# Patient Record
Sex: FEMALE | Race: WHITE | NOT HISPANIC OR LATINO | URBAN - METROPOLITAN AREA
[De-identification: names, ages, dates, MRNs, and addresses within clinical notes are randomized per-mention and may not be internally consistent; named-entity substitution may affect disease eponyms.]

---

## 2017-05-17 ENCOUNTER — OUTPATIENT (OUTPATIENT)
Dept: OUTPATIENT SERVICES | Facility: HOSPITAL | Age: 67
LOS: 1 days | Discharge: HOME | End: 2017-05-17

## 2017-06-28 DIAGNOSIS — Z12.31 ENCOUNTER FOR SCREENING MAMMOGRAM FOR MALIGNANT NEOPLASM OF BREAST: ICD-10-CM

## 2018-05-23 ENCOUNTER — OUTPATIENT (OUTPATIENT)
Dept: OUTPATIENT SERVICES | Facility: HOSPITAL | Age: 68
LOS: 1 days | Discharge: HOME | End: 2018-05-23

## 2018-05-23 DIAGNOSIS — Z12.31 ENCOUNTER FOR SCREENING MAMMOGRAM FOR MALIGNANT NEOPLASM OF BREAST: ICD-10-CM

## 2019-09-24 ENCOUNTER — OUTPATIENT (OUTPATIENT)
Dept: OUTPATIENT SERVICES | Facility: HOSPITAL | Age: 69
LOS: 1 days | Discharge: HOME | End: 2019-09-24
Payer: MEDICARE

## 2019-09-24 DIAGNOSIS — Z12.31 ENCOUNTER FOR SCREENING MAMMOGRAM FOR MALIGNANT NEOPLASM OF BREAST: ICD-10-CM

## 2019-09-24 PROCEDURE — 77063 BREAST TOMOSYNTHESIS BI: CPT | Mod: 26

## 2019-09-24 PROCEDURE — 77067 SCR MAMMO BI INCL CAD: CPT | Mod: 26

## 2019-12-08 ENCOUNTER — EMERGENCY (EMERGENCY)
Facility: HOSPITAL | Age: 69
LOS: 0 days | Discharge: HOME | End: 2019-12-08
Attending: EMERGENCY MEDICINE | Admitting: EMERGENCY MEDICINE
Payer: MEDICARE

## 2019-12-08 VITALS
OXYGEN SATURATION: 99 % | DIASTOLIC BLOOD PRESSURE: 95 MMHG | HEART RATE: 70 BPM | SYSTOLIC BLOOD PRESSURE: 222 MMHG | TEMPERATURE: 97 F | RESPIRATION RATE: 18 BRPM | HEIGHT: 62 IN | WEIGHT: 153 LBS

## 2019-12-08 VITALS — HEART RATE: 70 BPM | SYSTOLIC BLOOD PRESSURE: 214 MMHG | RESPIRATION RATE: 14 BRPM | DIASTOLIC BLOOD PRESSURE: 93 MMHG

## 2019-12-08 DIAGNOSIS — S00.93XA CONTUSION OF UNSPECIFIED PART OF HEAD, INITIAL ENCOUNTER: ICD-10-CM
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
## 2019-12-08 DIAGNOSIS — R51 HEADACHE: ICD-10-CM
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
## 2019-12-08 DIAGNOSIS — I10 ESSENTIAL (PRIMARY) HYPERTENSION: ICD-10-CM

## 2019-12-08 PROCEDURE — 99284 EMERGENCY DEPT VISIT MOD MDM: CPT

## 2019-12-08 PROCEDURE — 72125 CT NECK SPINE W/O DYE: CPT | Mod: 26

## 2019-12-08 PROCEDURE — 70450 CT HEAD/BRAIN W/O DYE: CPT | Mod: 26

## 2019-12-08 RX ORDER — METHOCARBAMOL 500 MG/1
1500 TABLET, FILM COATED ORAL ONCE
Refills: 0 | Status: COMPLETED | OUTPATIENT
Start: 2019-12-08 | End: 2019-12-08

## 2019-12-08 RX ORDER — IBUPROFEN 200 MG
1 TABLET ORAL
Qty: 15 | Refills: 0
Start: 2019-12-08 | End: 2019-12-12

## 2019-12-08 RX ORDER — METHOCARBAMOL 500 MG/1
1 TABLET, FILM COATED ORAL
Qty: 10 | Refills: 0
Start: 2019-12-08 | End: 2019-12-12

## 2019-12-08 RX ORDER — ACETAMINOPHEN 500 MG
975 TABLET ORAL ONCE
Refills: 0 | Status: COMPLETED | OUTPATIENT
Start: 2019-12-08 | End: 2019-12-08

## 2019-12-08 RX ADMIN — Medication 975 MILLIGRAM(S): at 20:17

## 2019-12-08 RX ADMIN — METHOCARBAMOL 1500 MILLIGRAM(S): 500 TABLET, FILM COATED ORAL at 20:17

## 2019-12-08 NOTE — ED PROVIDER NOTE - CARE PLAN
Principal Discharge DX:	Fall  Secondary Diagnosis:	Hematoma  Secondary Diagnosis:	HTN (hypertension)

## 2019-12-08 NOTE — ED ADULT TRIAGE NOTE - CHIEF COMPLAINT QUOTE
"I was on a three step ladder. I went down the first step and then I thought I was on the ground and I fell. I hit my head. I did not black out. im not on any anticoagulants"

## 2019-12-08 NOTE — ED ADULT NURSE NOTE - OBJECTIVE STATEMENT
Pt reports fall off 3 step ladder hitting the back of her head, denies LOC. Reports bump to back of head with pain to back of head and some discomfort to the neck. No obvious injuries noted, Speaking in full sentences, denies nausea, vomiting, dizziness, chest pain, or sob. PERRL. Pt noted to be hypertensive. Medication administered, CT scan to be performed and vitals to be repeated after medications. Pt reports being evaluated by her PMD for hypertension but has not be prescribe any medication as of now.

## 2019-12-08 NOTE — ED PROVIDER NOTE - NS ED ROS FT
Constitutional: (-) fever  Eyes/ENT: (-) visual changes   Cardiovascular: (-) chest pain, (-) syncope  Respiratory: (-) cough, (-) shortness of breath  Gastrointestinal: (-) vomiting, (-) diarrhea  Genitourinary: (-) dysuria, (-) hesitancy, (-) frequency   Musculoskeletal: (+) neck pain, (-) back pain, (-) joint pain  Integumentary: (-) rash, (-) edema  Neurological: (-) headache, (-) altered mental status  Allergic/Immunologic: (-) pruritus

## 2019-12-08 NOTE — ED ADULT NURSE NOTE - INTERVENTIONS DEFINITIONS
[place] to call system/Non-slip footwear when patient is off stretcher/Physically safe environment: no spills, clutter or unnecessary equipment/Stretcher in lowest position, wheels locked, appropriate side rails in place/Monitor for mental status changes and reorient to person, place, and time/Reinforce activity limits and safety measures with patient and family

## 2019-12-08 NOTE — ED PROVIDER NOTE - NSFOLLOWUPINSTRUCTIONS_ED_ALL_ED_FT
Please follow up with your primary care physician within 24-72 hours and return immediately if symptoms worsen.    Fall Prevention in the Home, Adult  Falls can cause injuries and can affect people from all age groups. There are many simple things that you can do to make your home safe and to help prevent falls. Ask for help when making these changes, if needed.    What actions can I take to prevent falls?  General instructions     Use good lighting in all rooms. Replace any light bulbs that burn out.  Turn on lights if it is dark. Use night-lights.  Place frequently used items in easy-to-reach places. Lower the shelves around your home if necessary.  Set up furniture so that there are clear paths around it. Avoid moving your furniture around.  Remove throw rugs and other tripping hazards from the floor.  Avoid walking on wet floors.  Fix any uneven floor surfaces.  Add color or contrast paint or tape to grab bars and handrails in your home. Place contrasting color strips on the first and last steps of stairways.  When you use a stepladder, make sure that it is completely opened and that the sides are firmly locked. Have someone hold the ladder while you are using it. Do not climb a closed stepladder.  Be aware of any and all pets.  What can I do in the bathroom?     Keep the floor dry. Immediately clean up any water that spills onto the floor.  Remove soap buildup in the tub or shower on a regular basis.  Use non-skid mats or decals on the floor of the tub or shower.  Attach bath mats securely with double-sided, non-slip rug tape.  If you need to sit down while you are in the shower, use a plastic, non-slip stool.  Image ImageInstall grab bars by the toilet and in the tub and shower. Do not use towel bars as grab bars.  What can I do in the bedroom?     Make sure that a bedside light is easy to reach.  Do not use oversized bedding that drapes onto the floor.  Have a firm chair that has side arms to use for getting dressed.  What can I do in the kitchen?     Clean up any spills right away.  If you need to reach for something above you, use a sturdy step stool that has a grab bar.  Keep electrical cables out of the way.  Do not use floor polish or wax that makes floors slippery. If you must use wax, make sure that it is non-skid floor wax.  What can I do in the stairways?     Do not leave any items on the stairs.  Make sure that you have a light switch at the top of the stairs and the bottom of the stairs. Have them installed if you do not have them.  Make sure that there are handrails on both sides of the stairs. Fix handrails that are broken or loose. Make sure that handrails are as long as the stairways.  Install non-slip stair treads on all stairs in your home.  Avoid having throw rugs at the top or bottom of stairways, or secure the rugs with carpet tape to prevent them from moving.  Choose a carpet design that does not hide the edge of steps on the stairway.  Check any carpeting to make sure that it is firmly attached to the stairs. Fix any carpet that is loose or worn.  What can I do on the outside of my home?     Use bright outdoor lighting.  Regularly repair the edges of walkways and driveways and fix any cracks.  Remove high doorway thresholds.  Trim any shrubbery on the main path into your home.  Regularly check that handrails are securely fastened and in good repair. Both sides of any steps should have handrails.  Install guardrails along the edges of any raised decks or porches.  Clear walkways of debris and clutter, including tools and rocks.  Have leaves, snow, and ice cleared regularly.  Use sand or salt on walkways during winter months.  In the garage, clean up any spills right away, including grease or oil spills.  What other actions can I take?     Wear closed-toe shoes that fit well and support your feet. Wear shoes that have rubber soles or low heels.  Use mobility aids as needed, such as canes, walkers, scooters, and crutches.  Review your medicines with your health care provider. Some medicines can cause dizziness or changes in blood pressure, which increase your risk of falling.  Talk with your health care provider about other ways that you can decrease your risk of falls. This may include working with a physical therapist or  to improve your strength, balance, and endurance.    Where to find more information  Centers for Disease Control and Prevention, [place]: https://www.cdc.gov  [employer] [place] [employer] Aging: https://yt0rjkp.hernandez.nih.gov  Contact a health care provider if:  You are afraid of falling at home.  You feel weak, drowsy, or dizzy at home.  You fall at home.  Summary  There are many simple things that you can do to make your home safe and to help prevent falls.  Ways to make your home safe include removing tripping hazards and installing grab bars in the bathroom.  Ask for help when making these changes in your home.  This information is not intended to replace advice given to you by your health care provider. Make sure you discuss any questions you have with your health care provider.    Hypertension  Hypertension, commonly called high blood pressure, is when the force of blood pumping through the arteries is too strong. The arteries are the blood vessels that carry blood from the heart throughout the body. Hypertension forces the heart to work harder to pump blood and may cause arteries to become narrow or stiff. Having untreated or uncontrolled hypertension can cause heart attacks, strokes, kidney disease, and other problems.    A blood pressure reading consists of a higher number over a lower number. Ideally, your blood pressure should be below 120/80. The first ("top") number is called the systolic pressure. It is a measure of the pressure in your arteries as your heart beats. The second ("bottom") number is called the diastolic pressure. It is a measure of the pressure in your arteries as the heart relaxes.    What are the causes?  The cause of this condition is not known.    What increases the risk?  Some risk factors for high blood pressure are under your control. Others are not.    Factors you can change     Smoking.  Having type 2 diabetes mellitus, high cholesterol, or both.  Not getting enough exercise or physical activity.  Being overweight.  Having too much fat, sugar, calories, or salt (sodium) in your diet.  Drinking too much alcohol.  Factors that are difficult or impossible to change     Having chronic kidney disease.  Having a family history of high blood pressure.  Age. Risk increases with age.  Race. You may be at higher risk if you are -American.  Gender. Men are at higher risk than women before age 45. After age 65, women are at higher risk than men.  Having obstructive sleep apnea.  Stress.  What are the signs or symptoms?  Extremely high blood pressure (hypertensive crisis) may cause:    Headache.  Anxiety.  Shortness of breath.  Nosebleed.  Nausea and vomiting.  Severe chest pain.  Jerky movements you cannot control (seizures).    How is this diagnosed?  This condition is diagnosed by measuring your blood pressure while you are seated, with your arm resting on a surface. The cuff of the blood pressure monitor will be placed directly against the skin of your upper arm at the level of your heart. It should be measured at least twice using the same arm. Certain conditions can cause a difference in blood pressure between your right and left arms.    Certain factors can cause blood pressure readings to be lower or higher than normal (elevated) for a short period of time:    When your blood pressure is higher when you are in a health care provider's office than when you are at home, this is called white coat hypertension. Most people with this condition do not need medicines.  When your blood pressure is higher at home than when you are in a health care provider's office, this is called masked hypertension. Most people with this condition may need medicines to control blood pressure.    If you have a high blood pressure reading during one visit or you have normal blood pressure with other risk factors:    You may be asked to return on a different day to have your blood pressure checked again.  You may be asked to monitor your blood pressure at home for 1 week or longer.    If you are diagnosed with hypertension, you may have other blood or imaging tests to help your health care provider understand your overall risk for other conditions.    How is this treated?  This condition is treated by making healthy lifestyle changes, such as eating healthy foods, exercising more, and reducing your alcohol intake. Your health care provider may prescribe medicine if lifestyle changes are not enough to get your blood pressure under control, and if:    Your systolic blood pressure is above 130.  Your diastolic blood pressure is above 80.    Your personal target blood pressure may vary depending on your medical conditions, your age, and other factors.    Follow these instructions at home:  Eating and drinking     Eat a diet that is high in fiber and potassium, and low in sodium, added sugar, and fat. An example eating plan is called the DASH (Dietary Approaches to Stop Hypertension) diet. To eat this way:    Eat plenty of fresh fruits and vegetables. Try to fill half of your plate at each meal with fruits and vegetables.  Eat whole grains, such as whole wheat pasta, brown rice, or whole grain bread. Fill about one quarter of your plate with whole grains.  Eat or drink low-fat dairy products, such as skim milk or low-fat yogurt.  Avoid fatty cuts of meat, processed or cured meats, and poultry with skin. Fill about one quarter of your plate with lean proteins, such as fish, chicken without skin, beans, eggs, and tofu.  Avoid premade and processed foods. These tend to be higher in sodium, added sugar, and fat.    Reduce your daily sodium intake. Most people with hypertension should eat less than 1,500 mg of sodium a day.  ImageLimit alcohol intake to no more than 1 drink a day for nonpregnant women and 2 drinks a day for men. One drink equals 12 oz of beer, 5 oz of wine, or 1½ oz of hard liquor.  Lifestyle     Work with your health care provider to maintain a healthy body weight or to lose weight. Ask what an ideal weight is for you.  Get at least 30 minutes of exercise that causes your heart to beat faster (aerobic exercise) most days of the week. Activities may include walking, swimming, or biking.  Include exercise to strengthen your muscles (resistance exercise), such as pilates or lifting weights, as part of your weekly exercise routine. Try to do these types of exercises for 30 minutes at least 3 days a week.  Do not use any products that contain nicotine or tobacco, such as cigarettes and e-cigarettes. If you need help quitting, ask your health care provider.  Monitor your blood pressure at home as told by your health care provider.  Keep all follow-up visits as told by your health care provider. This is important.  Medicines     Take over-the-counter and prescription medicines only as told by your health care provider. Follow directions carefully. Blood pressure medicines must be taken as prescribed.  Do not skip doses of blood pressure medicine. Doing this puts you at risk for problems and can make the medicine less effective.  Ask your health care provider about side effects or reactions to medicines that you should watch for.  Contact a health care provider if:  You think you are having a reaction to a medicine you are taking.  You have headaches that keep coming back (recurring).  You feel dizzy.  You have swelling in your ankles.  You have trouble with your vision.  Get help right away if:  You develop a severe headache or confusion.  You have unusual weakness or numbness.  You feel faint.  You have severe pain in your chest or abdomen.  You vomit repeatedly.  You have trouble breathing.  Summary  Hypertension is when the force of blood pumping through your arteries is too strong. If this condition is not controlled, it may put you at risk for serious complications.  Your personal target blood pressure may vary depending on your medical conditions, your age, and other factors. For most people, a normal blood pressure is less than 120/80.  Hypertension is treated with lifestyle changes, medicines, or a combination of both. Lifestyle changes include weight loss, eating a healthy, low-sodium diet, exercising more, and limiting alcohol.  This information is not intended to replace advice given to you by your health care provider. Make sure you discuss any questions you have with your health care provider.

## 2019-12-08 NOTE — ED PROVIDER NOTE - OBJECTIVE STATEMENT
70 yo female with no known pmh presents after a fall about 1 hour before arrival. pt states she missed the last step on her step-stool and fell onto her buttock. she hit her head with no LOC or headache. currently has pain to her neck and buttock. ambulated after incident with no pain of difficulty. denies any other symptoms including fevers, chill, headache, recent illness/travel, cough, abdominal pain, chest pain, or SOB

## 2019-12-08 NOTE — ED PROVIDER NOTE - PATIENT PORTAL LINK FT
You can access the FollowMyHealth Patient Portal offered by [facility] by registering at the following website: [email or web address]. By joining [facility]’s FollowMyHealth portal, you will also be able to view your health information using other applications (apps) compatible with our system.

## 2020-10-16 ENCOUNTER — OUTPATIENT (OUTPATIENT)
Dept: OUTPATIENT SERVICES | Facility: HOSPITAL | Age: 70
LOS: 1 days | Discharge: HOME | End: 2020-10-16
Payer: MEDICARE

## 2020-10-16 DIAGNOSIS — Z12.31 ENCOUNTER FOR SCREENING MAMMOGRAM FOR MALIGNANT NEOPLASM OF BREAST: ICD-10-CM

## 2020-10-16 PROCEDURE — 77067 SCR MAMMO BI INCL CAD: CPT | Mod: 26

## 2020-10-16 PROCEDURE — 77063 BREAST TOMOSYNTHESIS BI: CPT | Mod: 26

## 2020-10-23 ENCOUNTER — OUTPATIENT (OUTPATIENT)
Dept: OUTPATIENT SERVICES | Facility: HOSPITAL | Age: 70
LOS: 1 days | Discharge: HOME | End: 2020-10-23
Payer: MEDICARE

## 2020-10-23 DIAGNOSIS — R92.8 OTHER ABNORMAL AND INCONCLUSIVE FINDINGS ON DIAGNOSTIC IMAGING OF BREAST: ICD-10-CM

## 2020-10-23 PROCEDURE — G0279: CPT | Mod: 26

## 2020-10-23 PROCEDURE — 77065 DX MAMMO INCL CAD UNI: CPT | Mod: 26,LT

## 2020-10-23 PROCEDURE — 76642 ULTRASOUND BREAST LIMITED: CPT | Mod: 26,LT

## 2020-11-04 ENCOUNTER — RESULT REVIEW (OUTPATIENT)
Age: 70
End: 2020-11-04

## 2020-11-04 ENCOUNTER — OUTPATIENT (OUTPATIENT)
Dept: OUTPATIENT SERVICES | Facility: HOSPITAL | Age: 70
LOS: 1 days | Discharge: HOME | End: 2020-11-04
Payer: MEDICARE

## 2020-11-04 PROCEDURE — 77065 DX MAMMO INCL CAD UNI: CPT | Mod: 26,LT

## 2020-11-04 PROCEDURE — 19083 BX BREAST 1ST LESION US IMAG: CPT | Mod: LT

## 2020-11-04 PROCEDURE — 88305 TISSUE EXAM BY PATHOLOGIST: CPT | Mod: 26

## 2020-11-05 LAB — SURGICAL PATHOLOGY STUDY: SIGNIFICANT CHANGE UP

## 2020-11-09 DIAGNOSIS — R92.1 MAMMOGRAPHIC CALCIFICATION FOUND ON DIAGNOSTIC IMAGING OF BREAST: ICD-10-CM

## 2020-11-09 DIAGNOSIS — N63.20 UNSPECIFIED LUMP IN THE LEFT BREAST, UNSPECIFIED QUADRANT: ICD-10-CM

## 2020-11-09 DIAGNOSIS — R59.0 LOCALIZED ENLARGED LYMPH NODES: ICD-10-CM

## 2021-01-19 NOTE — ED ADULT TRIAGE NOTE - TEMPERATURE IN FAHRENHEIT (DEGREES F)
[Nocturia] : nocturia [Negative] : Integumentary [Dysuria] : no dysuria [Incontinence] : no incontinence [Hematuria] : no hematuria [Frequency] : no frequency 96.8

## 2021-06-29 ENCOUNTER — OUTPATIENT (OUTPATIENT)
Dept: OUTPATIENT SERVICES | Facility: HOSPITAL | Age: 71
LOS: 1 days | Discharge: HOME | End: 2021-06-29
Payer: MEDICARE

## 2021-06-29 DIAGNOSIS — R92.8 OTHER ABNORMAL AND INCONCLUSIVE FINDINGS ON DIAGNOSTIC IMAGING OF BREAST: ICD-10-CM

## 2021-06-29 PROCEDURE — 76642 ULTRASOUND BREAST LIMITED: CPT | Mod: 26,LT

## 2021-07-26 PROBLEM — Z00.00 ENCOUNTER FOR PREVENTIVE HEALTH EXAMINATION: Status: ACTIVE | Noted: 2021-07-26

## 2021-10-07 ENCOUNTER — APPOINTMENT (OUTPATIENT)
Dept: BREAST CENTER | Facility: CLINIC | Age: 71
End: 2021-10-07
Payer: MEDICARE

## 2021-10-07 VITALS
HEIGHT: 62 IN | TEMPERATURE: 96.3 F | BODY MASS INDEX: 25.58 KG/M2 | SYSTOLIC BLOOD PRESSURE: 136 MMHG | WEIGHT: 139 LBS | DIASTOLIC BLOOD PRESSURE: 80 MMHG

## 2021-10-07 DIAGNOSIS — R59.9 ENLARGED LYMPH NODES, UNSPECIFIED: ICD-10-CM

## 2021-10-07 DIAGNOSIS — Z78.9 OTHER SPECIFIED HEALTH STATUS: ICD-10-CM

## 2021-10-07 DIAGNOSIS — Z86.39 PERSONAL HISTORY OF OTHER ENDOCRINE, NUTRITIONAL AND METABOLIC DISEASE: ICD-10-CM

## 2021-10-07 DIAGNOSIS — Z86.79 PERSONAL HISTORY OF OTHER DISEASES OF THE CIRCULATORY SYSTEM: ICD-10-CM

## 2021-10-07 DIAGNOSIS — R92.8 OTHER ABNORMAL AND INCONCLUSIVE FINDINGS ON DIAGNOSTIC IMAGING OF BREAST: ICD-10-CM

## 2021-10-07 DIAGNOSIS — Z80.3 FAMILY HISTORY OF MALIGNANT NEOPLASM OF BREAST: ICD-10-CM

## 2021-10-07 PROCEDURE — 99203 OFFICE O/P NEW LOW 30 MIN: CPT

## 2021-10-07 RX ORDER — OLMESARTAN MEDOXOMIL-HYDROCHLOROTHIAZIDE 25; 40 MG/1; MG/1
TABLET, FILM COATED ORAL
Refills: 0 | Status: ACTIVE | COMMUNITY

## 2021-10-07 RX ORDER — ROSUVASTATIN CALCIUM 5 MG/1
TABLET, FILM COATED ORAL
Refills: 0 | Status: ACTIVE | COMMUNITY

## 2021-10-07 NOTE — PHYSICAL EXAM
[Normocephalic] : normocephalic [Atraumatic] : atraumatic [No Supraclavicular Adenopathy] : no supraclavicular adenopathy [No Cervical Adenopathy] : no cervical adenopathy [No dominant masses] : no dominant masses in right breast  [No dominant masses] : no dominant masses left breast [No Nipple Discharge] : no left nipple discharge [No Axillary Lymphadenopathy] : no left axillary lymphadenopathy [Breast Nipple Inversion] : nipples not inverted [Breast Nipple Retraction] : nipples not retracted [de-identified] : Scattered bilateral nodularity without any dominant mass. [de-identified] : previous surgical incision. [de-identified] : B/L nonspecific fullness noted.

## 2021-10-07 NOTE — REVIEW OF SYSTEMS
[Negative] : Heme/Lymph [Breast Pain] : no breast pain [Breast Lump] : no breast lump [Nipple Discharge] : no nipple discharge [Nipple Inverted] : no inversion of the nipple

## 2021-10-07 NOTE — DATA REVIEWED
[FreeTextEntry1] :  MG MAMMO SCREEN W TREVOR BI#      \par PROCEDURE DATE:  10/16/2020  \par \par INTERPRETATION:  HISTORY:\par Bilateral MG MAMMO SCREEN W TREVOR BI# was performed. Patient is 70 years old and is seen for screening. The patient has no personal history of cancer. The patient has a history of left excisional biopsy at age 50 - benign. The patient has the following family history of breast cancer:  mother and mother, at age 56, breast cancer.\par \par RISK ASSESSMENT:\par NCI Lifetime Risk: 10.4\par Tyrer-Cuzick Lifetime Risk: 11.1\par \par CLINICAL BREAST EXAM:\par The patient reports her last clinical breast exam was performed 1 month ago.\par \par COMPARISON STUDIES:\par The present examination has been compared to prior imaging studies performed at [facility] on 05/17/2017, 05/23/2018 and 09/24/2019.\par \par MAMMOGRAM FINDINGS:\par Mammography was performed including the following views: bilateral craniocaudal with tomosynthesis and bilateral mediolateral oblique with tomosynthesis.  The examination includes digital synthetic 2D and digital tomosynthesis 3D images. Additional imaging analysis was performed using CAD (computer-aided detection) software.\par \par The breasts are heterogeneously dense, which may obscure small masses.\par \par There are calcifications in the left axilla.\par \par No suspicious mass, grouping of calcifications, or other abnormality is identified in the right breast.\par \par IMPRESSION:\par Calcifications in the left axilla require additional evaluation.\par \par RECOMMENDATION:\par Patient will be recalled for additional views.\par \par ASSESSMENT:\par BI-RADS Category 0:  Incomplete: Needs Additional Imaging Evaluation\par \par EXAM:  MG US BREAST LIMITED LT      \par EXAM:  MG MAMMO DIAG W TREVOR LT#      \par \par *** ADDENDUM 10/26/2020  ***\par \par Medical assistant Krysten at Dr. Ricky Salamanca' office is notified of the result and recommendations on 10/26/2020 at 1:50 PM.\par \par *** END OF ADDENDUM 10/26/2020  ***\par \par \par \par PROCEDURE DATE:  10/23/2020  \par \par \par \par INTERPRETATION:  Clinical History / Reason for exam: The patient returns for additional imaging of  left breast  from screening mammogram dated 10/16/2020.\par \par FAMILY HISTORY OF BREAST CANCER: Mother at age 56\par \par VIEWS: Left breast magnification images includung full 90 degree and tomosynthesis. CAD was also utilized.\par \par COMPARISON: 10/16/2020 through 12/29/2014\par \par BREAST COMPOSITION: The breasts are heterogeneously dense, which may obscure small masses.\par \par FINDINGS:\par Fine microcalcifications are seen involving multiple lymph nodes in the left axillary tail.\par \par BREAST ULTRASOUND:\par Targeted ultrasound of the left lateral breast demonstrates a 5 x 8 x 4 mm hypoechoic mass with internal hyperechoic foci, probably a lymph node with calcifications. The finding is located at 3:00 axis, 14 cm from the nipple. Ultrasound-guided biopsy is suggested.\par \par IMPRESSION:\par \par Multiple left axillary lymph nodes with microcalcifications. Ultrasound-guided biopsy is suggested.\par \par \par Recommendation: Ultrasound guided biopsy.\par \par BI-RADS Category 4: Suspicious\par \par \par EXAM:  MG US BX BRST 1ST LT SISC      \par EXAM:  MG MAMMO DIAG LT      \par \par *** ADDENDUM 11/06/2020  ***\par \par Pathology addendum:\par \par Pathology: LYMPH NODE FRAGMENTS WITH FOCAL AGE RELATED HYALINE DEPOSITION ASSOCIATED WITH CALCIFICATION..\par \par Benign concordant histology.\par \par Recommendation: Follow-up ultrasound in 6 months\par \par The results were discussed with the patient by telephone on 11/6/2020 at 12:30 PM\par \par *** END OF ADDENDUM 11/06/2020  ***\par \par \par \par PROCEDURE DATE:  11/04/2020  \par \par \par \par INTERPRETATION:  CLINICAL HISTORY: Ultrasound-guided biopsy of a mass with calcifications in the left axilla\par \par PROCEDURES: left breast breast ultrasound guided spring loaded core biopsy and post clip placement two view left breast mammogram.\par \par TECHNIQUE: Previous films and reports were reviewed. The procedure, its risks, benefits, and alternatives were explained to the patient, who expressed understanding and gave informed written and verbal consent. A time-out, which included the patient's full name, date of birth, description of the expected procedure and procedure site, was performed immediately before the procedure.\par \par Using the usual sterile technique and ultrasound guidance, the previously described 8 mm node with calcifications in the left breast at 3:00 14 cm from the nipple was biopsied utilizing a 14-gauge automated Bard core biopsy device with a 13-gauge introducer sheath. 4 samples were collected.  A marking clip (Twirl) was deployed under ultrasound guidance. Hemostasis was obtained and a sterile dressing was applied.\par \par A left mammogram was performed:\par VIEWS: CC and ML views of the left breast.\par BREAST COMPOSITION: The breasts are heterogeneously dense, which may obscure small masses.\par FINDINGS: The biopsy clip placed during the ultrasound guided biopsy is in the anticipated location in the left breast.\par FINAL ASSESSMENT Category: Post Procedure Mammogram for Marker Placement\par \par The patient tolerated the procedure well and left the department in good condition with written discharge instructions.\par \par \par IMPRESSION:\par \par Successful ultrasound guided core biopsy of the left breast.\par \par Pathology: Pending, to be reported as an addendum.\par \par \par EXAM:  MG US BREAST LIMITED LT      \par \par \par PROCEDURE DATE:  06/29/2021  \par \par \par \par INTERPRETATION:  Clinical History / Reason for exam: Short interval follow-up after benign biopsy\par \par FAMILY HISTORY OF BREAST CANCER: Mother at age 56\par \par \par FINDINGS:\par \par BREAST ULTRASOUND:\par Targeted ultrasound of the left lateral breast demonstrates a 13 x 13 x 8 mm hypoechoic mass with punctate calcifications within previously measuring 5 x 8 x 4 mm. The finding is located at 3:00 axis, 14 cm from the nipple.\par \par IMPRESSION:\par \par Interval enlargement of the previously biopsied lesion.\par \par \par Recommendation: Surgical consultation\par \par BI-RADS Category 2: Benign\par \par The findings and recommendations were discussed with the patient prior to departure.\par \par

## 2021-10-07 NOTE — REASON FOR VISIT
[Initial Evaluation] : an initial evaluation [Spouse] : spouse [FreeTextEntry1] : imaging / path review.

## 2021-10-07 NOTE — HISTORY OF PRESENT ILLNESS
[FreeTextEntry1] : PCP: Dr. Bello Uriostegui\par GYN: Dr. Ricky Hardy\par \par Radha is 71 year old female here for evaluation of enlarged LEFT axillary LN s/p bx.\par \par Her work up was as follows: \par 10/16/2020 B/L Screening Mammo:\par -Breasts are heterogeneously dense.\par -Calcifications noted in the left axilla.\par BI-RADS Category 0:  Incomplete: Needs Additional Imaging Evaluation\par \par 10/23/2020 LEFT Uni Dx Mammo & Sono:\par -Fine microcalcifications are seen involving multiple lymph nodes in the left axillary tail.\par - On sono, 5 x 8 x 4 mm hypoechoic mass with internal hyperechoic foci, probably a lymph node with calcifications. The finding is located at 3:00 axis, 14 cm from the nipple. Ultrasound-guided biopsy is suggested.\par BI-RADS Category 4: Suspicious\par \par 2020 - Ultrasound Guided Core Bx (Twirl) \par Left, 3:00 N14, 8mm: (twirl)\par - Lymph node fragments with focal age related hyaline deposition associated with calcification.\par - No glandular breast tissue is identified in this biopsy material.\par Benign concordant histology.\par Recommendation: Follow-up ultrasound in 6 months\par \par 2021 - Left Breast Sono:\par -13 x 13 x 8 mm hypoechoic mass with punctate calcifications within previously measuring 5 x 8 x 4 mm.\par -Interval enlargement of the previously biopsied lesion.\par -Recommendation: Surgical consultation\par BI-RADS Category 2: Benign\par \par \par HISTORICAL RISK FACTORS:\par -Previous benign mass excision of the left breast - .\par -Family hx of breast cancer - mother (56).\par -\par -No prior reported OCP use.\par -No gyn surgeries. \par \par *Of note - Pt received COVID-19 vaccine in Left upper extremity in  / 2021.\par \par Umu Model Risk Assessment:\par 5 yr - 5.2%\par Life - 12.3%\par \par Tyrer-Cuzick Risk Assessment (v6):\par 5 yr - 3.8% \par Life - 7.9%\par

## 2021-10-07 NOTE — ASSESSMENT
[FreeTextEntry1] : Radha is 71 year old female here for evaluation of enlarged left axillary lymph node s/p bx yielding benign results.\par \par On physical exam, there is scattered bilateral nodularity without any dominant mass.  The axilla has some B/L nonspecific fullness noted.\par \par We discussed her biopsy results in detail.  And of note, she received her COVID-19 vaccines in her left upper extremity in Jan / Feb 2021.  Theoretically this could have caused an enlargement of the axillary lymph nodes.\par Since she is due for her annual breast imaging this month, I think it is reasonable to have this repeated before proceeding with any surgical excision.\par \par Although she has a family history of breast cancer in her mother, she does not quite meet the requirements for high risk screening with breast MRI's and / or chemoprophylaxis. \par \par We discussed dense breasts.  Women who have dense breast tissue have a higher risk of breast cancer compared to women with less dense breast tissue.  It is unclear at this time why dense breast tissue is linked to breast cancer risk.  One can consider bilateral screening ultrasound for patients with heterogeneously to extremely dense breasts.  However, out of 1000 women screened, the use of routine ultrasound will only identify an additional 3-5 cancers.  The use of ultrasound was found to increase the likelihood of undergoing more imaging and more biopsies.  She does have dense breasts.  We have decided to proceed with screening bilateral breast ultrasound at this time.  This will be scheduled with her next screening mammogram.  \par \par PLAN:\par -B/L Dx Mammo & Sono - on or about 10/17/2021.\par -Plan to discuss results when available; and if benign she will follow up in April 2022.

## 2021-10-26 ENCOUNTER — RESULT REVIEW (OUTPATIENT)
Age: 71
End: 2021-10-26

## 2021-10-26 ENCOUNTER — OUTPATIENT (OUTPATIENT)
Dept: OUTPATIENT SERVICES | Facility: HOSPITAL | Age: 71
LOS: 1 days | Discharge: HOME | End: 2021-10-26
Payer: MEDICARE

## 2021-10-26 DIAGNOSIS — R92.8 OTHER ABNORMAL AND INCONCLUSIVE FINDINGS ON DIAGNOSTIC IMAGING OF BREAST: ICD-10-CM

## 2021-10-26 PROCEDURE — 76642 ULTRASOUND BREAST LIMITED: CPT | Mod: 26,LT

## 2021-10-26 PROCEDURE — G0279: CPT | Mod: 26

## 2021-10-26 PROCEDURE — 77066 DX MAMMO INCL CAD BI: CPT | Mod: 26

## 2021-10-27 ENCOUNTER — NON-APPOINTMENT (OUTPATIENT)
Age: 71
End: 2021-10-27

## 2021-12-14 ENCOUNTER — NON-APPOINTMENT (OUTPATIENT)
Age: 71
End: 2021-12-14

## 2022-04-26 ENCOUNTER — OUTPATIENT (OUTPATIENT)
Dept: OUTPATIENT SERVICES | Facility: HOSPITAL | Age: 72
LOS: 1 days | Discharge: HOME | End: 2022-04-26
Payer: MEDICARE

## 2022-04-26 ENCOUNTER — RESULT REVIEW (OUTPATIENT)
Age: 72
End: 2022-04-26

## 2022-04-26 DIAGNOSIS — R92.8 OTHER ABNORMAL AND INCONCLUSIVE FINDINGS ON DIAGNOSTIC IMAGING OF BREAST: ICD-10-CM

## 2022-04-26 PROCEDURE — 76642 ULTRASOUND BREAST LIMITED: CPT | Mod: 26,LT

## 2022-04-27 ENCOUNTER — NON-APPOINTMENT (OUTPATIENT)
Age: 72
End: 2022-04-27

## 2022-10-28 ENCOUNTER — RESULT REVIEW (OUTPATIENT)
Age: 72
End: 2022-10-28

## 2022-10-28 ENCOUNTER — OUTPATIENT (OUTPATIENT)
Dept: OUTPATIENT SERVICES | Facility: HOSPITAL | Age: 72
LOS: 1 days | Discharge: HOME | End: 2022-10-28

## 2022-10-28 DIAGNOSIS — R92.8 OTHER ABNORMAL AND INCONCLUSIVE FINDINGS ON DIAGNOSTIC IMAGING OF BREAST: ICD-10-CM

## 2022-10-28 PROCEDURE — 76642 ULTRASOUND BREAST LIMITED: CPT | Mod: 26,LT

## 2022-10-28 PROCEDURE — G0279: CPT | Mod: 26

## 2022-10-28 PROCEDURE — 77066 DX MAMMO INCL CAD BI: CPT | Mod: 26

## 2022-11-03 ENCOUNTER — APPOINTMENT (OUTPATIENT)
Dept: BREAST CENTER | Facility: CLINIC | Age: 72
End: 2022-11-03

## 2022-11-10 ENCOUNTER — OUTPATIENT (OUTPATIENT)
Dept: OUTPATIENT SERVICES | Facility: HOSPITAL | Age: 72
LOS: 1 days | Discharge: HOME | End: 2022-11-10

## 2022-11-10 ENCOUNTER — TRANSCRIPTION ENCOUNTER (OUTPATIENT)
Age: 72
End: 2022-11-10

## 2022-11-10 VITALS
HEART RATE: 58 BPM | DIASTOLIC BLOOD PRESSURE: 69 MMHG | SYSTOLIC BLOOD PRESSURE: 150 MMHG | TEMPERATURE: 97 F | WEIGHT: 147.93 LBS | OXYGEN SATURATION: 99 % | RESPIRATION RATE: 17 BRPM | HEIGHT: 62 IN

## 2022-11-10 VITALS
SYSTOLIC BLOOD PRESSURE: 169 MMHG | RESPIRATION RATE: 17 BRPM | HEART RATE: 68 BPM | DIASTOLIC BLOOD PRESSURE: 72 MMHG | OXYGEN SATURATION: 99 %

## 2022-11-10 DIAGNOSIS — Z98.890 OTHER SPECIFIED POSTPROCEDURAL STATES: Chronic | ICD-10-CM

## 2022-11-10 DIAGNOSIS — Z90.49 ACQUIRED ABSENCE OF OTHER SPECIFIED PARTS OF DIGESTIVE TRACT: Chronic | ICD-10-CM

## 2022-11-10 RX ORDER — SODIUM CHLORIDE 9 MG/ML
1000 INJECTION, SOLUTION INTRAVENOUS
Refills: 0 | Status: DISCONTINUED | OUTPATIENT
Start: 2022-11-10 | End: 2022-11-10

## 2022-11-10 NOTE — ASU DISCHARGE PLAN (ADULT/PEDIATRIC) - NS MD DC FALL RISK RISK
For information on Fall & Injury Prevention, visit: [email or web address] OR  [email or web address] OR  https://www.cdc.gov/steadi/patient.html

## 2022-11-10 NOTE — PACU DISCHARGE NOTE - COMMENTS
72 y o female S/P Left ECCE with IOL Implant, LSB/MAC without complications. VS /70 HR 51 RR 16 SaO2 99%. Pt tolerated procedure well.

## 2022-11-10 NOTE — ASU PATIENT PROFILE, ADULT - FALL HARM RISK - UNIVERSAL INTERVENTIONS
Bed in lowest position, wheels locked, appropriate side rails in place/Call bell, personal items and telephone in reach/Instruct patient to call for assistance before getting out of bed or chair/Non-slip footwear when patient is out of bed/[place] to call system/Physically safe environment - no spills, clutter or unnecessary equipment/Purposeful Proactive Rounding/Room/bathroom lighting operational, light cord in reach

## 2022-11-16 DIAGNOSIS — Z79.1 LONG TERM (CURRENT) USE OF NON-STEROIDAL ANTI-INFLAMMATORIES (NSAID): ICD-10-CM

## 2022-11-16 DIAGNOSIS — H25.89 OTHER AGE-RELATED CATARACT: ICD-10-CM

## 2022-11-16 DIAGNOSIS — Z91.040 LATEX ALLERGY STATUS: ICD-10-CM

## 2022-11-16 DIAGNOSIS — Z88.0 ALLERGY STATUS TO PENICILLIN: ICD-10-CM

## 2022-11-17 ENCOUNTER — OUTPATIENT (OUTPATIENT)
Dept: OUTPATIENT SERVICES | Facility: HOSPITAL | Age: 72
LOS: 1 days | Discharge: HOME | End: 2022-11-17

## 2022-11-17 VITALS
HEART RATE: 55 BPM | SYSTOLIC BLOOD PRESSURE: 158 MMHG | RESPIRATION RATE: 18 BRPM | OXYGEN SATURATION: 100 % | DIASTOLIC BLOOD PRESSURE: 72 MMHG

## 2022-11-17 VITALS — HEIGHT: 62 IN | WEIGHT: 147.93 LBS

## 2022-11-17 DIAGNOSIS — Z98.42 CATARACT EXTRACTION STATUS, LEFT EYE: Chronic | ICD-10-CM

## 2022-11-17 DIAGNOSIS — Z90.49 ACQUIRED ABSENCE OF OTHER SPECIFIED PARTS OF DIGESTIVE TRACT: Chronic | ICD-10-CM

## 2022-11-17 DIAGNOSIS — Z98.890 OTHER SPECIFIED POSTPROCEDURAL STATES: Chronic | ICD-10-CM

## 2022-11-17 RX ORDER — ROSUVASTATIN CALCIUM 5 MG/1
1 TABLET ORAL
Qty: 0 | Refills: 0 | DISCHARGE

## 2022-11-17 RX ORDER — OLMESARTAN MEDOXOMIL 5 MG/1
1 TABLET, FILM COATED ORAL
Qty: 0 | Refills: 0 | DISCHARGE

## 2022-11-17 NOTE — ASU PATIENT PROFILE, ADULT - PATIENT KNOW
yes Cosentyx Counseling:  I discussed with the patient the risks of Cosentyx including but not limited to worsening of Crohn's disease, immunosuppression, allergic reactions and infections.  The patient understands that monitoring is required including a PPD at baseline and must alert us or the primary physician if symptoms of infection or other concerning signs are noted.

## 2022-11-17 NOTE — ASU PATIENT PROFILE, ADULT - NS PRO LAST MENSTRUAL
Message relayed to patient who verbalized understanding. Nothing further at this time. not applicable

## 2022-11-17 NOTE — ASU PATIENT PROFILE, ADULT - FALL HARM RISK - UNIVERSAL INTERVENTIONS
Bed in lowest position, wheels locked, appropriate side rails in place/Call bell, personal items and telephone in reach/Instruct patient to call for assistance before getting out of bed or chair/Non-slip footwear when patient is out of bed/[place] to call system/Physically safe environment - no spills, clutter or unnecessary equipment/Purposeful Proactive Rounding/Room/bathroom lighting operational, light cord in reach

## 2022-11-17 NOTE — PRE-ANESTHESIA EVALUATION ADULT - NSANTHOSAYNRD_GEN_A_CORE
denies/No. YAQUELIN screening performed.  STOP BANG Legend: 0-2 = LOW Risk; 3-4 = INTERMEDIATE Risk; 5-8 = HIGH Risk

## 2022-11-17 NOTE — ASU PATIENT PROFILE, ADULT - NSICDXPASTSURGICALHX_GEN_ALL_CORE_FT
PAST SURGICAL HISTORY:  H/O lumpectomy left    History of dilation and curettage     S/P liudmila     Status post cataract extraction and insertion of intraocular lens, left

## 2022-11-22 DIAGNOSIS — E78.5 HYPERLIPIDEMIA, UNSPECIFIED: ICD-10-CM

## 2022-11-22 DIAGNOSIS — H25.091 OTHER AGE-RELATED INCIPIENT CATARACT, RIGHT EYE: ICD-10-CM

## 2022-11-22 DIAGNOSIS — Z91.040 LATEX ALLERGY STATUS: ICD-10-CM

## 2022-11-22 DIAGNOSIS — Z88.0 ALLERGY STATUS TO PENICILLIN: ICD-10-CM

## 2022-11-22 DIAGNOSIS — I10 ESSENTIAL (PRIMARY) HYPERTENSION: ICD-10-CM

## 2023-09-05 NOTE — ASU PATIENT PROFILE, ADULT - AS SC BRADEN MOBILITY
Hospitalist notified of vitals and Cardizem drip stopped at this time. Patient awake and alert with no s/s of pain/discomfort. All needs met. Call light within reach. (4) no limitation

## 2023-09-22 NOTE — ASU PATIENT PROFILE, ADULT - FALL HARM RISK - CONCLUSION
Detail Level: Detailed Add 00288 Cpt? (Important Note: In 2017 The Use Of 86829 Is Being Tracked By Cms To Determine Future Global Period Reimbursement For Global Periods): yes Universal Safety Interventions

## 2023-12-26 PROBLEM — I10 ESSENTIAL (PRIMARY) HYPERTENSION: Chronic | Status: ACTIVE | Noted: 2022-11-10

## 2023-12-26 PROBLEM — E78.5 HYPERLIPIDEMIA, UNSPECIFIED: Chronic | Status: ACTIVE | Noted: 2022-11-10

## 2023-12-26 PROBLEM — H26.9 UNSPECIFIED CATARACT: Chronic | Status: ACTIVE | Noted: 2022-11-10

## 2024-01-03 ENCOUNTER — OUTPATIENT (OUTPATIENT)
Dept: OUTPATIENT SERVICES | Facility: HOSPITAL | Age: 74
LOS: 1 days | End: 2024-01-03
Payer: MEDICARE

## 2024-01-03 DIAGNOSIS — Z90.49 ACQUIRED ABSENCE OF OTHER SPECIFIED PARTS OF DIGESTIVE TRACT: Chronic | ICD-10-CM

## 2024-01-03 DIAGNOSIS — Z98.890 OTHER SPECIFIED POSTPROCEDURAL STATES: Chronic | ICD-10-CM

## 2024-01-03 DIAGNOSIS — Z98.42 CATARACT EXTRACTION STATUS, LEFT EYE: Chronic | ICD-10-CM

## 2024-01-03 DIAGNOSIS — Z12.31 ENCOUNTER FOR SCREENING MAMMOGRAM FOR MALIGNANT NEOPLASM OF BREAST: ICD-10-CM

## 2024-01-03 PROCEDURE — 77063 BREAST TOMOSYNTHESIS BI: CPT | Mod: 26

## 2024-01-03 PROCEDURE — 77067 SCR MAMMO BI INCL CAD: CPT

## 2024-01-03 PROCEDURE — 77063 BREAST TOMOSYNTHESIS BI: CPT

## 2024-01-03 PROCEDURE — 77067 SCR MAMMO BI INCL CAD: CPT | Mod: 26

## 2024-01-04 DIAGNOSIS — Z12.31 ENCOUNTER FOR SCREENING MAMMOGRAM FOR MALIGNANT NEOPLASM OF BREAST: ICD-10-CM

## 2024-02-16 NOTE — ASU PATIENT PROFILE, ADULT - PAIN CHRONIC, PROFILE
Contact Numbers  [facility]: [phone] (for symptom and scheduling needs)    Please call the [facility] Triage line if you experience a temperature greater than or equal to 100.4, shaking chills, have uncontrolled nausea, vomiting and/or diarrhea, dizziness, shortness of breath, chest pain, bleeding, unexplained bruising, or if you have any other new/concerning symptoms, questions or concerns.     If you are having any concerning symptoms or wish to speak to a provider before your next infusion visit, please call your care coordinator or triage to notify them so we can adequately serve you.     If you need a refill on a narcotic prescription or other medication, please call triage before your infusion appointment.     
no

## 2024-06-07 NOTE — ASU PATIENT PROFILE, ADULT - SUPPORT PERSON NAME
Please follow-up with your vascular specialist in order to further investigate your pain with ambulating.  Return to the emergency room for any worsening symptoms.    Cellulitis    Cellulitis is a skin infection caused by bacteria. This condition occurs most often in the arms and lower legs but can occur anywhere over the body. Symptoms include redness, swelling, warm skin, tenderness, and chills/fever. If you were prescribed an antibiotic medicine, take it as told by your health care provider. Do not stop taking the antibiotic even if you start to feel better.    SEEK IMMEDIATE MEDICAL CARE IF YOU HAVE ANY OF THE FOLLOWING SYMPTOMS: worsening fever, red streaks coming from affected area, vomiting or diarrhea, or dizziness/lightheadedness.
Caleb

## 2024-09-23 NOTE — ASU PATIENT PROFILE, ADULT - ACCEPTABLE
Placed call to pt, spoke with daughter regarding the missed lab of today, and to remind of appt with Patrick Levine NP on 9/24.  She stated pt is now under the care of [facility] since last Wednesday, 9/18.  Will notify Dr Lawrence, Patrick eLvine NP, and infusion.  Appt cancelled with Patrick Levine NP.   0

## 2025-02-17 NOTE — ED PROVIDER NOTE - PHYSICAL EXAMINATION
Gen: NAD, AOx3  Head: NCAT  HEENT: PERRL, oral mucosa moist, normal conjunctiva, oropharynx clear without exudate or erythema, hematoma to L parietal with point tenderness   Lung: CTAB, no respiratory distress, no wheezing, rales, rhonchi  CV: normal s1/s2, rrr, Normal perfusion, pulses 2+ throughout  Abd: soft, NTND, no CVA tenderness  Genitourinary: no pelvic tenderness  MSK: No edema, no visible deformities, full range of motion in all 4 extremities, no spinal tenderness, ROM of neck with no tenderness, tenderness to trapezius   Neuro: CN II-XII grossly intact, No focal neurologic deficits, no nystagmus/pronator drift, coordination/sensation intact, strength 5/5 BUE/BLE, steady gait   Skin: No rash   Psych: normal affect Caesarean section

## 2025-04-23 ENCOUNTER — INPATIENT (INPATIENT)
Facility: HOSPITAL | Age: 75
LOS: 8 days | Discharge: HOME HEALTH | DRG: 754 | End: 2025-05-02
Attending: INTERNAL MEDICINE | Admitting: STUDENT IN AN ORGANIZED HEALTH CARE EDUCATION/TRAINING PROGRAM
Payer: MEDICARE

## 2025-04-23 VITALS
OXYGEN SATURATION: 97 % | TEMPERATURE: 99 F | HEART RATE: 88 BPM | WEIGHT: 164.91 LBS | DIASTOLIC BLOOD PRESSURE: 79 MMHG | SYSTOLIC BLOOD PRESSURE: 173 MMHG | RESPIRATION RATE: 18 BRPM

## 2025-04-23 DIAGNOSIS — D63.1 ANEMIA IN CHRONIC KIDNEY DISEASE: ICD-10-CM

## 2025-04-23 DIAGNOSIS — N39.0 URINARY TRACT INFECTION, SITE NOT SPECIFIED: ICD-10-CM

## 2025-04-23 DIAGNOSIS — C56.2 MALIGNANT NEOPLASM OF LEFT OVARY: ICD-10-CM

## 2025-04-23 DIAGNOSIS — D63.0 ANEMIA IN NEOPLASTIC DISEASE: ICD-10-CM

## 2025-04-23 DIAGNOSIS — G93.41 METABOLIC ENCEPHALOPATHY: ICD-10-CM

## 2025-04-23 DIAGNOSIS — Z88.0 ALLERGY STATUS TO PENICILLIN: ICD-10-CM

## 2025-04-23 DIAGNOSIS — N18.30 CHRONIC KIDNEY DISEASE, STAGE 3 UNSPECIFIED: ICD-10-CM

## 2025-04-23 DIAGNOSIS — E87.20 ACIDOSIS, UNSPECIFIED: ICD-10-CM

## 2025-04-23 DIAGNOSIS — E87.5 HYPERKALEMIA: ICD-10-CM

## 2025-04-23 DIAGNOSIS — R63.4 ABNORMAL WEIGHT LOSS: ICD-10-CM

## 2025-04-23 DIAGNOSIS — C78.89 SECONDARY MALIGNANT NEOPLASM OF OTHER DIGESTIVE ORGANS: ICD-10-CM

## 2025-04-23 DIAGNOSIS — C78.7 SECONDARY MALIGNANT NEOPLASM OF LIVER AND INTRAHEPATIC BILE DUCT: ICD-10-CM

## 2025-04-23 DIAGNOSIS — N17.9 ACUTE KIDNEY FAILURE, UNSPECIFIED: ICD-10-CM

## 2025-04-23 DIAGNOSIS — Z90.49 ACQUIRED ABSENCE OF OTHER SPECIFIED PARTS OF DIGESTIVE TRACT: Chronic | ICD-10-CM

## 2025-04-23 DIAGNOSIS — Z98.890 OTHER SPECIFIED POSTPROCEDURAL STATES: Chronic | ICD-10-CM

## 2025-04-23 DIAGNOSIS — R50.82 POSTPROCEDURAL FEVER: ICD-10-CM

## 2025-04-23 DIAGNOSIS — N13.30 UNSPECIFIED HYDRONEPHROSIS: ICD-10-CM

## 2025-04-23 DIAGNOSIS — Z66 DO NOT RESUSCITATE: ICD-10-CM

## 2025-04-23 DIAGNOSIS — E78.5 HYPERLIPIDEMIA, UNSPECIFIED: ICD-10-CM

## 2025-04-23 DIAGNOSIS — Z98.42 CATARACT EXTRACTION STATUS, LEFT EYE: Chronic | ICD-10-CM

## 2025-04-23 DIAGNOSIS — Z91.040 LATEX ALLERGY STATUS: ICD-10-CM

## 2025-04-23 DIAGNOSIS — I12.9 HYPERTENSIVE CHRONIC KIDNEY DISEASE WITH STAGE 1 THROUGH STAGE 4 CHRONIC KIDNEY DISEASE, OR UNSPECIFIED CHRONIC KIDNEY DISEASE: ICD-10-CM

## 2025-04-23 DIAGNOSIS — C79.51 SECONDARY MALIGNANT NEOPLASM OF BONE: ICD-10-CM

## 2025-04-23 LAB
ALBUMIN SERPL ELPH-MCNC: 3.3 G/DL — LOW (ref 3.5–5.2)
ALP SERPL-CCNC: 104 U/L — SIGNIFICANT CHANGE UP (ref 30–115)
ALT FLD-CCNC: 78 U/L — HIGH (ref 0–41)
ANION GAP SERPL CALC-SCNC: 15 MMOL/L — HIGH (ref 7–14)
APPEARANCE UR: CLEAR — SIGNIFICANT CHANGE UP
AST SERPL-CCNC: 112 U/L — HIGH (ref 0–41)
BACTERIA # UR AUTO: NEGATIVE /HPF — SIGNIFICANT CHANGE UP
BASE EXCESS BLDV CALC-SCNC: -7.1 MMOL/L — LOW (ref -2–3)
BASOPHILS # BLD AUTO: 0.03 K/UL — SIGNIFICANT CHANGE UP (ref 0–0.2)
BASOPHILS NFR BLD AUTO: 0.3 % — SIGNIFICANT CHANGE UP (ref 0–1)
BILIRUB SERPL-MCNC: <0.2 MG/DL — SIGNIFICANT CHANGE UP (ref 0.2–1.2)
BILIRUB UR-MCNC: NEGATIVE — SIGNIFICANT CHANGE UP
BUN SERPL-MCNC: 71 MG/DL — CRITICAL HIGH (ref 10–20)
CA-I SERPL-SCNC: 1.29 MMOL/L — SIGNIFICANT CHANGE UP (ref 1.15–1.33)
CALCIUM SERPL-MCNC: 9 MG/DL — SIGNIFICANT CHANGE UP (ref 8.4–10.5)
CAST: 2 /LPF — SIGNIFICANT CHANGE UP (ref 0–4)
CHLORIDE SERPL-SCNC: 111 MMOL/L — HIGH (ref 98–110)
CO2 SERPL-SCNC: 16 MMOL/L — LOW (ref 17–32)
COLOR SPEC: SIGNIFICANT CHANGE UP
CREAT SERPL-MCNC: 1.9 MG/DL — HIGH (ref 0.7–1.5)
DIFF PNL FLD: NEGATIVE — SIGNIFICANT CHANGE UP
EGFR: 27 ML/MIN/1.73M2 — LOW
EGFR: 27 ML/MIN/1.73M2 — LOW
EOSINOPHIL # BLD AUTO: 0.05 K/UL — SIGNIFICANT CHANGE UP (ref 0–0.7)
EOSINOPHIL NFR BLD AUTO: 0.5 % — SIGNIFICANT CHANGE UP (ref 0–8)
GAS PNL BLDV: 138 MMOL/L — SIGNIFICANT CHANGE UP (ref 136–145)
GAS PNL BLDV: SIGNIFICANT CHANGE UP
GLUCOSE SERPL-MCNC: 138 MG/DL — HIGH (ref 70–99)
GLUCOSE UR QL: NEGATIVE MG/DL — SIGNIFICANT CHANGE UP
HCO3 BLDV-SCNC: 17 MMOL/L — LOW (ref 22–29)
HCT VFR BLD CALC: 27.8 % — LOW (ref 37–47)
HCT VFR BLDA CALC: 27 % — LOW (ref 34.5–46.5)
HGB BLD CALC-MCNC: 9.1 G/DL — LOW (ref 11.7–16.1)
HGB BLD-MCNC: 9 G/DL — LOW (ref 12–16)
IMM GRANULOCYTES NFR BLD AUTO: 1.1 % — HIGH (ref 0.1–0.3)
KETONES UR-MCNC: NEGATIVE MG/DL — SIGNIFICANT CHANGE UP
LACTATE BLDV-MCNC: 0.7 MMOL/L — SIGNIFICANT CHANGE UP (ref 0.5–2)
LEUKOCYTE ESTERASE UR-ACNC: ABNORMAL
LYMPHOCYTES # BLD AUTO: 0.61 K/UL — LOW (ref 1.2–3.4)
LYMPHOCYTES # BLD AUTO: 6.5 % — LOW (ref 20.5–51.1)
MCHC RBC-ENTMCNC: 29.5 PG — SIGNIFICANT CHANGE UP (ref 27–31)
MCHC RBC-ENTMCNC: 32.4 G/DL — SIGNIFICANT CHANGE UP (ref 32–37)
MCV RBC AUTO: 91.1 FL — SIGNIFICANT CHANGE UP (ref 81–99)
MONOCYTES # BLD AUTO: 0.99 K/UL — HIGH (ref 0.1–0.6)
MONOCYTES NFR BLD AUTO: 10.6 % — HIGH (ref 1.7–9.3)
NEUTROPHILS # BLD AUTO: 7.57 K/UL — HIGH (ref 1.4–6.5)
NEUTROPHILS NFR BLD AUTO: 81 % — HIGH (ref 42.2–75.2)
NITRITE UR-MCNC: NEGATIVE — SIGNIFICANT CHANGE UP
NRBC BLD AUTO-RTO: 0 /100 WBCS — SIGNIFICANT CHANGE UP (ref 0–0)
PCO2 BLDV: 27 MMHG — LOW (ref 39–42)
PH BLDV: 7.4 — SIGNIFICANT CHANGE UP (ref 7.32–7.43)
PH UR: 5.5 — SIGNIFICANT CHANGE UP (ref 5–8)
PLATELET # BLD AUTO: 245 K/UL — SIGNIFICANT CHANGE UP (ref 130–400)
PMV BLD: 11.6 FL — HIGH (ref 7.4–10.4)
PO2 BLDV: 85 MMHG — HIGH (ref 25–45)
POTASSIUM BLDV-SCNC: 5 MMOL/L — SIGNIFICANT CHANGE UP (ref 3.5–5.1)
POTASSIUM SERPL-MCNC: 5.4 MMOL/L — HIGH (ref 3.5–5)
POTASSIUM SERPL-SCNC: 5.4 MMOL/L — HIGH (ref 3.5–5)
PROT SERPL-MCNC: 6.6 G/DL — SIGNIFICANT CHANGE UP (ref 6–8)
PROT UR-MCNC: 100 MG/DL
RBC # BLD: 3.05 M/UL — LOW (ref 4.2–5.4)
RBC # FLD: 13.5 % — SIGNIFICANT CHANGE UP (ref 11.5–14.5)
RBC CASTS # UR COMP ASSIST: 1 /HPF — SIGNIFICANT CHANGE UP (ref 0–4)
SAO2 % BLDV: 98.9 % — HIGH (ref 67–88)
SODIUM SERPL-SCNC: 142 MMOL/L — SIGNIFICANT CHANGE UP (ref 135–146)
SP GR SPEC: 1.02 — SIGNIFICANT CHANGE UP (ref 1–1.03)
SQUAMOUS # UR AUTO: 1 /HPF — SIGNIFICANT CHANGE UP (ref 0–5)
UROBILINOGEN FLD QL: 1 MG/DL — SIGNIFICANT CHANGE UP (ref 0.2–1)
WBC # BLD: 9.35 K/UL — SIGNIFICANT CHANGE UP (ref 4.8–10.8)
WBC # FLD AUTO: 9.35 K/UL — SIGNIFICANT CHANGE UP (ref 4.8–10.8)
WBC UR QL: 21 /HPF — HIGH (ref 0–5)

## 2025-04-23 PROCEDURE — 74177 CT ABD & PELVIS W/CONTRAST: CPT | Mod: MC

## 2025-04-23 PROCEDURE — 49180 BIOPSY ABDOMINAL MASS: CPT

## 2025-04-23 PROCEDURE — 99285 EMERGENCY DEPT VISIT HI MDM: CPT | Mod: FS

## 2025-04-23 PROCEDURE — 84156 ASSAY OF PROTEIN URINE: CPT

## 2025-04-23 PROCEDURE — 86900 BLOOD TYPING SEROLOGIC ABO: CPT

## 2025-04-23 PROCEDURE — 83605 ASSAY OF LACTIC ACID: CPT

## 2025-04-23 PROCEDURE — A9579: CPT

## 2025-04-23 PROCEDURE — 36415 COLL VENOUS BLD VENIPUNCTURE: CPT

## 2025-04-23 PROCEDURE — 70450 CT HEAD/BRAIN W/O DYE: CPT | Mod: MC

## 2025-04-23 PROCEDURE — 82140 ASSAY OF AMMONIA: CPT

## 2025-04-23 PROCEDURE — 86160 COMPLEMENT ANTIGEN: CPT

## 2025-04-23 PROCEDURE — 85610 PROTHROMBIN TIME: CPT

## 2025-04-23 PROCEDURE — 86901 BLOOD TYPING SEROLOGIC RH(D): CPT

## 2025-04-23 PROCEDURE — 85025 COMPLETE CBC W/AUTO DIFF WBC: CPT

## 2025-04-23 PROCEDURE — 85730 THROMBOPLASTIN TIME PARTIAL: CPT

## 2025-04-23 PROCEDURE — 83550 IRON BINDING TEST: CPT

## 2025-04-23 PROCEDURE — 83735 ASSAY OF MAGNESIUM: CPT

## 2025-04-23 PROCEDURE — 86334 IMMUNOFIX E-PHORESIS SERUM: CPT

## 2025-04-23 PROCEDURE — 87086 URINE CULTURE/COLONY COUNT: CPT

## 2025-04-23 PROCEDURE — 88305 TISSUE EXAM BY PATHOLOGIST: CPT

## 2025-04-23 PROCEDURE — 84100 ASSAY OF PHOSPHORUS: CPT

## 2025-04-23 PROCEDURE — 82728 ASSAY OF FERRITIN: CPT

## 2025-04-23 PROCEDURE — 86225 DNA ANTIBODY NATIVE: CPT

## 2025-04-23 PROCEDURE — 81001 URINALYSIS AUTO W/SCOPE: CPT

## 2025-04-23 PROCEDURE — 84300 ASSAY OF URINE SODIUM: CPT

## 2025-04-23 PROCEDURE — 86850 RBC ANTIBODY SCREEN: CPT

## 2025-04-23 PROCEDURE — 93005 ELECTROCARDIOGRAM TRACING: CPT

## 2025-04-23 PROCEDURE — 82570 ASSAY OF URINE CREATININE: CPT

## 2025-04-23 PROCEDURE — 80061 LIPID PANEL: CPT

## 2025-04-23 PROCEDURE — 86255 FLUORESCENT ANTIBODY SCREEN: CPT

## 2025-04-23 PROCEDURE — 83935 ASSAY OF URINE OSMOLALITY: CPT

## 2025-04-23 PROCEDURE — 86304 IMMUNOASSAY TUMOR CA 125: CPT

## 2025-04-23 PROCEDURE — 99152 MOD SED SAME PHYS/QHP 5/>YRS: CPT

## 2025-04-23 PROCEDURE — 82607 VITAMIN B-12: CPT

## 2025-04-23 PROCEDURE — 85652 RBC SED RATE AUTOMATED: CPT

## 2025-04-23 PROCEDURE — 80048 BASIC METABOLIC PNL TOTAL CA: CPT

## 2025-04-23 PROCEDURE — 84704 HCG FREE BETACHAIN TEST: CPT

## 2025-04-23 PROCEDURE — 86780 TREPONEMA PALLIDUM: CPT

## 2025-04-23 PROCEDURE — 76856 US EXAM PELVIC COMPLETE: CPT

## 2025-04-23 PROCEDURE — 76830 TRANSVAGINAL US NON-OB: CPT

## 2025-04-23 PROCEDURE — 87040 BLOOD CULTURE FOR BACTERIA: CPT

## 2025-04-23 PROCEDURE — 83521 IG LIGHT CHAINS FREE EACH: CPT

## 2025-04-23 PROCEDURE — 72158 MRI LUMBAR SPINE W/O & W/DYE: CPT | Mod: MC

## 2025-04-23 PROCEDURE — 83540 ASSAY OF IRON: CPT

## 2025-04-23 PROCEDURE — 95816 EEG AWAKE AND DROWSY: CPT

## 2025-04-23 PROCEDURE — 74176 CT ABD & PELVIS W/O CONTRAST: CPT | Mod: MC

## 2025-04-23 PROCEDURE — 86038 ANTINUCLEAR ANTIBODIES: CPT

## 2025-04-23 PROCEDURE — 99153 MOD SED SAME PHYS/QHP EA: CPT

## 2025-04-23 PROCEDURE — 88341 IMHCHEM/IMCYTCHM EA ADD ANTB: CPT

## 2025-04-23 PROCEDURE — 88342 IMHCHEM/IMCYTCHM 1ST ANTB: CPT

## 2025-04-23 PROCEDURE — 83516 IMMUNOASSAY NONANTIBODY: CPT

## 2025-04-23 PROCEDURE — 84443 ASSAY THYROID STIM HORMONE: CPT

## 2025-04-23 PROCEDURE — 86301 IMMUNOASSAY TUMOR CA 19-9: CPT

## 2025-04-23 PROCEDURE — 86336 INHIBIN A: CPT

## 2025-04-23 PROCEDURE — 82378 CARCINOEMBRYONIC ANTIGEN: CPT

## 2025-04-23 PROCEDURE — 83520 IMMUNOASSAY QUANT NOS NONAB: CPT

## 2025-04-23 PROCEDURE — 80053 COMPREHEN METABOLIC PANEL: CPT

## 2025-04-23 PROCEDURE — 76770 US EXAM ABDO BACK WALL COMP: CPT

## 2025-04-23 PROCEDURE — 86036 ANCA SCREEN EACH ANTIBODY: CPT

## 2025-04-23 PROCEDURE — 84702 CHORIONIC GONADOTROPIN TEST: CPT

## 2025-04-23 PROCEDURE — 70553 MRI BRAIN STEM W/O & W/DYE: CPT | Mod: MC

## 2025-04-23 PROCEDURE — 82746 ASSAY OF FOLIC ACID SERUM: CPT

## 2025-04-23 PROCEDURE — 77012 CT SCAN FOR NEEDLE BIOPSY: CPT

## 2025-04-23 PROCEDURE — 0241U: CPT

## 2025-04-23 PROCEDURE — 84155 ASSAY OF PROTEIN SERUM: CPT

## 2025-04-23 PROCEDURE — 84165 PROTEIN E-PHORESIS SERUM: CPT

## 2025-04-23 PROCEDURE — 83036 HEMOGLOBIN GLYCOSYLATED A1C: CPT

## 2025-04-23 PROCEDURE — 88333 PATH CONSLTJ SURG CYTO XM 1: CPT

## 2025-04-23 RX ORDER — AZTREONAM 2 G/1
2000 INJECTION, POWDER, LYOPHILIZED, FOR SOLUTION INTRAMUSCULAR; INTRAVENOUS ONCE
Refills: 0 | Status: COMPLETED | OUTPATIENT
Start: 2025-04-23 | End: 2025-04-23

## 2025-04-23 RX ORDER — SODIUM CHLORIDE 9 G/1000ML
1000 INJECTION, SOLUTION INTRAVENOUS ONCE
Refills: 0 | Status: COMPLETED | OUTPATIENT
Start: 2025-04-23 | End: 2025-04-23

## 2025-04-23 RX ADMIN — AZTREONAM 100 MILLIGRAM(S): 2 INJECTION, POWDER, LYOPHILIZED, FOR SOLUTION INTRAMUSCULAR; INTRAVENOUS at 23:37

## 2025-04-23 NOTE — ED ADULT TRIAGE NOTE - CHIEF COMPLAINT QUOTE
pt BIBEMS from home for increased confusion, pt is normally confused as per  but more so today, states he thinks she has UTI

## 2025-04-23 NOTE — ED PROVIDER NOTE - CONSIDERATION OF ADMISSION OBSERVATION
Consideration of Admission/Observation Appropriate medications for patients presenting complaints were offered and effects were re-assessed Additional history was obtained from  at bedside . Escalation to admission was considered. At this time patient requires inpatient hospitalization.

## 2025-04-23 NOTE — ED PROVIDER NOTE - PHYSICAL EXAMINATION
CONST: NAD, WDWN  EYES: PERRLA, no photophobia, EOMI without pain, conjunctiva pink   ENT: Moist mucous membranes, no nasal discharge.   NECK: Supple, non-tender, no resistance  to flexion  CARD: Regular rate and rhythm  RESP: No resp distress, CTA b/l, no w/r/r  GI: (+) BS x 4, soft, non-tender, non-distended, no guarding/rebound  MS: FROM x4.  SKIN: Warm, dry, no acute rashes. Good turgor  NEURO: A&Ox1, No focal deficits

## 2025-04-23 NOTE — ED PROVIDER NOTE - OBJECTIVE STATEMENT
74yo f h/o HTN, HLD, per  pt has been on a cognitive decline over the last 5 months presents today with  for increased confusion over the last few days. Pt is without any complaints. No fever, no abd pain, no change in appetite

## 2025-04-23 NOTE — ED PROVIDER NOTE - CLINICAL SUMMARY MEDICAL DECISION MAKING FREE TEXT BOX
75-year-old female past medical history of hyper tension hyperlipidemia brought in by  at bedside who is providing history for concern for UTI.  Patient's been having a slowly decreased decline in her mental status 4 days ago had a home test where she was found to have a UTI and it was positive PCP prescribed nitrofurantoin 210 which patient is already took 4 doses however today noticed grade fever which is what prompted the visit.  No chest pain shortness of breath nausea vomiting no flank pain Vital signs reviewed.  Labs obtained and reviewed.  UA positive antibiotics given no baseline creatinine likely JAMIL secondary to decreased p.o. intake.  Patient admitted for UTI

## 2025-04-23 NOTE — ED PROVIDER NOTE - ATTENDING APP SHARED VISIT CONTRIBUTION OF CARE
75-year-old female past medical history of hyper tension hyperlipidemia brought in by  at bedside who is providing history for concern for UTI.  Patient's been having a slowly decreased decline in her mental status 4 days ago had a home test where she was found to have a UTI and it was positive PCP prescribed nitrofurantoin 210 which patient is already took 4 doses however today noticed grade fever which is what prompted the visit.  No chest pain shortness of breath nausea vomiting no flank pain    CONSTITUTIONAL: WA / WN / NAD  HEAD: NCAT  EYES: PERRL; EOMI;   ENT: Normal pharynx; mucous membranes pink/moist, no erythema.  NECK: Supple;   CARD: RRR; nl S1/S2; no M/R/G. Pulses equal bilaterally.  RESP: Respiratory rate and effort are normal; breath sounds clear and equal bilaterally.  ABD: Soft, ND no cva   MSK/EXT: No gross deformities; full range of motion.  SKIN: Warm and dry;   NEURO: AAOx2  PSYCH: calm and cooperative

## 2025-04-24 LAB
ALBUMIN SERPL ELPH-MCNC: 3.1 G/DL — LOW (ref 3.5–5.2)
ALP SERPL-CCNC: 95 U/L — SIGNIFICANT CHANGE UP (ref 30–115)
ALT FLD-CCNC: 63 U/L — HIGH (ref 0–41)
ANION GAP SERPL CALC-SCNC: 11 MMOL/L — SIGNIFICANT CHANGE UP (ref 7–14)
AST SERPL-CCNC: 81 U/L — HIGH (ref 0–41)
BASOPHILS # BLD AUTO: 0.01 K/UL — SIGNIFICANT CHANGE UP (ref 0–0.2)
BASOPHILS NFR BLD AUTO: 0.1 % — SIGNIFICANT CHANGE UP (ref 0–1)
BILIRUB SERPL-MCNC: <0.2 MG/DL — SIGNIFICANT CHANGE UP (ref 0.2–1.2)
BUN SERPL-MCNC: 66 MG/DL — CRITICAL HIGH (ref 10–20)
CALCIUM SERPL-MCNC: 8.7 MG/DL — SIGNIFICANT CHANGE UP (ref 8.4–10.5)
CHLORIDE SERPL-SCNC: 112 MMOL/L — HIGH (ref 98–110)
CO2 SERPL-SCNC: 17 MMOL/L — SIGNIFICANT CHANGE UP (ref 17–32)
CREAT SERPL-MCNC: 1.7 MG/DL — HIGH (ref 0.7–1.5)
EGFR: 31 ML/MIN/1.73M2 — LOW
EGFR: 31 ML/MIN/1.73M2 — LOW
EOSINOPHIL # BLD AUTO: 0.09 K/UL — SIGNIFICANT CHANGE UP (ref 0–0.7)
EOSINOPHIL NFR BLD AUTO: 1.1 % — SIGNIFICANT CHANGE UP (ref 0–8)
ERYTHROCYTE [SEDIMENTATION RATE] IN BLOOD: 104 MM/HR — HIGH (ref 0–20)
GLUCOSE SERPL-MCNC: 147 MG/DL — HIGH (ref 70–99)
HCT VFR BLD CALC: 25.5 % — LOW (ref 37–47)
HGB BLD-MCNC: 8.1 G/DL — LOW (ref 12–16)
IMM GRANULOCYTES NFR BLD AUTO: 1.1 % — HIGH (ref 0.1–0.3)
LYMPHOCYTES # BLD AUTO: 0.55 K/UL — LOW (ref 1.2–3.4)
LYMPHOCYTES # BLD AUTO: 6.7 % — LOW (ref 20.5–51.1)
MAGNESIUM SERPL-MCNC: 2 MG/DL — SIGNIFICANT CHANGE UP (ref 1.8–2.4)
MCHC RBC-ENTMCNC: 29.7 PG — SIGNIFICANT CHANGE UP (ref 27–31)
MCHC RBC-ENTMCNC: 31.8 G/DL — LOW (ref 32–37)
MCV RBC AUTO: 93.4 FL — SIGNIFICANT CHANGE UP (ref 81–99)
MONOCYTES # BLD AUTO: 0.82 K/UL — HIGH (ref 0.1–0.6)
MONOCYTES NFR BLD AUTO: 10 % — HIGH (ref 1.7–9.3)
NEUTROPHILS # BLD AUTO: 6.63 K/UL — HIGH (ref 1.4–6.5)
NEUTROPHILS NFR BLD AUTO: 81 % — HIGH (ref 42.2–75.2)
NRBC BLD AUTO-RTO: 0 /100 WBCS — SIGNIFICANT CHANGE UP (ref 0–0)
PLATELET # BLD AUTO: 231 K/UL — SIGNIFICANT CHANGE UP (ref 130–400)
PMV BLD: 11.7 FL — HIGH (ref 7.4–10.4)
POTASSIUM SERPL-MCNC: 5.1 MMOL/L — HIGH (ref 3.5–5)
POTASSIUM SERPL-SCNC: 5.1 MMOL/L — HIGH (ref 3.5–5)
PROT SERPL-MCNC: 5.9 G/DL — LOW (ref 6–8)
RBC # BLD: 2.73 M/UL — LOW (ref 4.2–5.4)
RBC # FLD: 13.5 % — SIGNIFICANT CHANGE UP (ref 11.5–14.5)
SODIUM SERPL-SCNC: 140 MMOL/L — SIGNIFICANT CHANGE UP (ref 135–146)
WBC # BLD: 8.19 K/UL — SIGNIFICANT CHANGE UP (ref 4.8–10.8)
WBC # FLD AUTO: 8.19 K/UL — SIGNIFICANT CHANGE UP (ref 4.8–10.8)

## 2025-04-24 PROCEDURE — 76770 US EXAM ABDO BACK WALL COMP: CPT | Mod: 26

## 2025-04-24 PROCEDURE — 93010 ELECTROCARDIOGRAM REPORT: CPT

## 2025-04-24 PROCEDURE — 70450 CT HEAD/BRAIN W/O DYE: CPT | Mod: 26

## 2025-04-24 PROCEDURE — 99223 1ST HOSP IP/OBS HIGH 75: CPT | Mod: AI

## 2025-04-24 RX ORDER — ROSUVASTATIN CALCIUM 20 MG/1
5 TABLET, FILM COATED ORAL AT BEDTIME
Refills: 0 | Status: DISCONTINUED | OUTPATIENT
Start: 2025-04-24 | End: 2025-05-02

## 2025-04-24 RX ORDER — ACETAMINOPHEN 500 MG/5ML
650 LIQUID (ML) ORAL EVERY 6 HOURS
Refills: 0 | Status: DISCONTINUED | OUTPATIENT
Start: 2025-04-24 | End: 2025-05-02

## 2025-04-24 RX ORDER — ONDANSETRON HCL/PF 4 MG/2 ML
4 VIAL (ML) INJECTION EVERY 8 HOURS
Refills: 0 | Status: DISCONTINUED | OUTPATIENT
Start: 2025-04-24 | End: 2025-05-02

## 2025-04-24 RX ORDER — SODIUM BICARBONATE 1 MEQ/ML
650 SYRINGE (ML) INTRAVENOUS EVERY 8 HOURS
Refills: 0 | Status: DISCONTINUED | OUTPATIENT
Start: 2025-04-24 | End: 2025-04-29

## 2025-04-24 RX ORDER — AZTREONAM 2 G/1
1000 INJECTION, POWDER, LYOPHILIZED, FOR SOLUTION INTRAMUSCULAR; INTRAVENOUS EVERY 12 HOURS
Refills: 0 | Status: DISCONTINUED | OUTPATIENT
Start: 2025-04-24 | End: 2025-04-26

## 2025-04-24 RX ORDER — HEPARIN SODIUM 1000 [USP'U]/ML
5000 INJECTION INTRAVENOUS; SUBCUTANEOUS EVERY 12 HOURS
Refills: 0 | Status: DISCONTINUED | OUTPATIENT
Start: 2025-04-24 | End: 2025-04-28

## 2025-04-24 RX ORDER — MAGNESIUM, ALUMINUM HYDROXIDE 200-200 MG
30 TABLET,CHEWABLE ORAL EVERY 4 HOURS
Refills: 0 | Status: DISCONTINUED | OUTPATIENT
Start: 2025-04-24 | End: 2025-05-02

## 2025-04-24 RX ORDER — MELATONIN 5 MG
3 TABLET ORAL AT BEDTIME
Refills: 0 | Status: DISCONTINUED | OUTPATIENT
Start: 2025-04-24 | End: 2025-05-02

## 2025-04-24 RX ORDER — SODIUM CHLORIDE 9 G/1000ML
1000 INJECTION, SOLUTION INTRAVENOUS
Refills: 0 | Status: DISCONTINUED | OUTPATIENT
Start: 2025-04-24 | End: 2025-04-25

## 2025-04-24 RX ADMIN — AZTREONAM 50 MILLIGRAM(S): 2 INJECTION, POWDER, LYOPHILIZED, FOR SOLUTION INTRAMUSCULAR; INTRAVENOUS at 18:01

## 2025-04-24 RX ADMIN — SODIUM CHLORIDE 60 MILLILITER(S): 9 INJECTION, SOLUTION INTRAVENOUS at 14:47

## 2025-04-24 RX ADMIN — HEPARIN SODIUM 5000 UNIT(S): 1000 INJECTION INTRAVENOUS; SUBCUTANEOUS at 18:01

## 2025-04-24 RX ADMIN — SODIUM CHLORIDE 1000 MILLILITER(S): 9 INJECTION, SOLUTION INTRAVENOUS at 00:52

## 2025-04-24 RX ADMIN — AZTREONAM 50 MILLIGRAM(S): 2 INJECTION, POWDER, LYOPHILIZED, FOR SOLUTION INTRAMUSCULAR; INTRAVENOUS at 05:30

## 2025-04-24 RX ADMIN — Medication 650 MILLIGRAM(S): at 14:46

## 2025-04-24 RX ADMIN — ROSUVASTATIN CALCIUM 5 MILLIGRAM(S): 20 TABLET, FILM COATED ORAL at 22:38

## 2025-04-24 RX ADMIN — Medication 650 MILLIGRAM(S): at 22:38

## 2025-04-24 NOTE — CONSULT NOTE ADULT - ASSESSMENT
acute kidney injury   - improving since admission   - proteinuria and pyuria on UA   - probably prerenal / ischemic tubular injury, r/o AIN     - elevated bun / cr ratio could be prerenal from decreased po intake on ARB, but r/o UGIB with new anemia or falsely low Cr from low muscle mass  CKD stage 3 (baseline Cr 1 and eGFR 58ml/min in 2024)  mild hyperkalemia  metabolic acidosis  AMS, subacute  wt loss / poor PO intake  new normocytic anemia (Hgb 11's 2024)  HTN    plan:    gentle LR IVF  hold olmesartan  agree with po bicarb  lokelma PRN  cont renal dose abx for possible UTI  check urine and blood cultures  obtain renal sono  resend UA, calculate UP/Cr, calculate FeNa%, send Uosm  send C3, C4, ANCA, TONY, anti-GBM, anti-DSDNA, SPEP, SFLC  send stool occult blood / trend H/H, send b12, folate, tsat%, ferritin  d/w  at bedside, PMD Dr. Uriostegui and Dr. Ayers

## 2025-04-24 NOTE — PATIENT PROFILE ADULT - FALL HARM RISK - HARM RISK INTERVENTIONS

## 2025-04-24 NOTE — H&P ADULT - ASSESSMENT
75-year-old female past medical history of hyper tension, hyperlipidemia brought in by  at bedside who is providing history for concern for UTI.     #UTI?  #JAMIL  #CKD  #HLD  #r/o Nephrotic Syndrome  - WBC 8  - Hgb 8.1  - Albumin 3.3  - Cr 1.9  - eGFR 27  - UA - no bacteria, but protein + WBC +, was already on Abx  - s/p Azetreonam in ED  - s/p IVF in ED  - f/u UCx and BCx  - f/u repeat CMP for hemolyzed results  - f/u lipid panel + phosphorus in am  - Nephro consult for possible nephrotic syndrome    #Anemia  - MCV WNL  - Likely CKD related  - Iron, folate, b12  - T+S    #HTN  - monitor; meds as appropriate    DVT: SQ hep  Diet: Renal  Act: Assist  Dispo: Med   75-year-old female past medical history of hyper tension, hyperlipidemia brought in by  at bedside who is providing history for concern for UTI.     #UTI?  #Prerenal JAMIL  #CKD  #HLD  #r/o Nephrotic Syndrome  - WBC 8  - Hgb 8.1  - Albumin 3.3  - BUN 71  - Cr 1.9  - eGFR 27  - UA - no bacteria, but protein + WBC +, was already on Abx  - s/p Azetreonam in ED  - s/p IVF in ED  - f/u UCx and BCx  - f/u repeat CMP for hemolyzed results  - f/u lipid panel + phosphorus in am  - Nephro consult for possible nephrotic syndrome    #Anemia  - MCV WNL  - Likely CKD related  - Iron, folate, b12  - T+S    #HTN  - monitor; meds as appropriate    DVT: SQ hep  Diet: Renal  Act: Assist  Dispo: Med

## 2025-04-24 NOTE — H&P ADULT - NSHPPHYSICALEXAM_GEN_ALL_CORE
GENERAL: Nondistressed  NEURO: Alert  HEAD: Atraumatic  NECK: Supple  EYES: Conjunctiva and sclera clear  LUNG: Decreased breath sounds  HEART: S1 S2 heard  ABDOMEN: Soft, Nontender, Nondistended; Bowel sounds present  EXTREMITIES: mild edema

## 2025-04-24 NOTE — H&P ADULT - HISTORY OF PRESENT ILLNESS
75-year-old female past medical history of hyper tension hyperlipidemia brought in by  at bedside who is providing history for concern for UTI.  Patient's been having a slowly decreased decline in her mental status 4 days ago had a home test where she was found to have a UTI and it was positive. Her PCP prescribed nitrofurantoin 210 which patient is already took 4 doses however today noticed low grade fever which is what prompted the visit.  No chest pain, shortness of breath, nausea, vomiting, no flank pain.    · BP Systolic	 173 mm Hg  · BP Diastolic	79 mm Hg  · Heart Rate	88 /min  · Respiration Rate (breaths/min)	18 /min  · Temp (F)		99.3 Degrees F  · SpO2 (%)	97 % room air    WBC 8  Hgb 8.1  Albumin 3.3  Cr 1.9  eGFR 27  UA - no bacteria, but protein + WBC +, was already on Abx   75-year-old female past medical history of hyper tension hyperlipidemia brought in by  at bedside who is providing history for concern for UTI.  Patient's been having a slowly decreased decline in her mental status 4 days ago had a home test where she was found to have a UTI and it was positive. Her PCP prescribed nitrofurantoin 210 which patient is already took 4 doses however today noticed low grade fever which is what prompted the visit.  No chest pain, shortness of breath, nausea, vomiting, no flank pain.    · BP Systolic	 173 mm Hg  · BP Diastolic	79 mm Hg  · Heart Rate	88 /min  · Respiration Rate (breaths/min)	18 /min  · Temp (F)		99.3 Degrees F  · SpO2 (%)	97 % room air    WBC 8  Hgb 8.1  Albumin 3.3  BUN 71  Cr 1.9  eGFR 27  UA - no bacteria, but protein + WBC +, was already on Abx

## 2025-04-24 NOTE — H&P ADULT - ATTENDING COMMENTS
75-year-old female past medical history of hyper tension hyperlipidemia brought in by  at bedside who is providing history for concern for UTI.  Patient's been having a slowly decreased decline in her mental status 4 days ago had a home test where she was found to have a UTI and it was positive. Her PCP prescribed nitrofurantoin 210 which patient is already took 4 doses however today noticed low grade fever which is what prompted the visit.  No chest pain, shortness of breath, nausea, vomiting, no flank pain. Upon further history, pt with gradual decline in mental status for months. Did not see neurologist yet. Very poor historian    Denies CP, SOB, N/V/D/C/AP, cough, F, chills, dizziness, new focal weakness, HA, vision changes, dysuria, or urinary symptoms, blood in stool.    ROS: all systems unremarkable except as above.     General: NAD. Looks stated age.  HEENT: clean oropharynx, PERRLA EOMI, no LAD  Neck: trachea midline, no thyromegaly  CV: S1 S2; no m/r/g  Resp: decreased breath sounds at base  GI: NT/ND/S +BS  MS: no clubbing/cyanosis/edema, +pulses  Neuro: motor, sensory intact; + reflexes  Skin: no rashes, nl turgor  Psychiatric: AA0x1 w/ compromised insight and judgement    EKG - nonspecific changes (my read)  Chart and consultant notes personally reviewed.  Care Discussed with Consultants/Other Providers/ Housestaff [ x] YES [ ] NO   Radiology, labs, old records personally reviewed.    75-year-old female past medical history of hyper tension, hyperlipidemia brought in by  at bedside who is providing history for concern for UTI.     #UTI?  #?JAMIL vs CKD3  #Suspected dementia r/o reversable causes  - WBC 8  - Hgb 8.1  - Albumin 3.3  - BUN 71  - Cr 1.9  - eGFR 27  - UA - no bacteria, but  + WBC +, was already on Abx  - s/p Azetreonam in ED cont for now. F/u UCx  - s/p IVF in ED  - f/u UCx and BCx  - f/u repeat CMP for hemolyzed results  - f/u lipid panel + phosphorus in am  -CTH, TSH, B12, RPR, ammonia    #Anemia  - MCV WNL  - Likely CKD related  - Iron, folate, b12  - T+S    #HTN/HLD  - monitor; meds as appropriate    DVT: SQ hep  Diet: Renal  Act: Assist  Dispo: Med    #Progress Note Handoff  Pending: Clinical improvement and stability__x__CTH, Cx  Pt/Family discussion: Pt/family informed and agree with the current plan  Disposition: Home_    My note supersedes the residents note should a discrepancy arise.    Chart and notes personally reviewed.  Care Discussed with Consultants/Other Providers/ Housestaff [ x] YES [ ] NO   Radiology, labs, old records personally reviewed.    discussed w/ housestaff, nursing, case management, renal    Attestation Statements:    Attestation Statements:  Risk Statement (NON-critical care).     On this date of service, level of risk to patient is considered: High.     Due to: pt with illness causing risk to life or bodily fxn    Time-based billing (NON-critical care).     75 minutes spent on total encounter. The necessity of the time spent during the encounter on this date of service was due to:     time spent on review of labs, imaging studies, old records, obtaining history, personally examining patient, counselling and communicating with patient/ family, entering orders for medications/tests/etc, discussions with other health care providers, documentation in electronic health records, independent interpretation of labs, imaging/procedure results and care coordination.

## 2025-04-24 NOTE — CONSULT NOTE ADULT - SUBJECTIVE AND OBJECTIVE BOX
NEPHROLOGY CONSULTATION NOTE        PAST MEDICAL & SURGICAL HISTORY:  HTN (hypertension)      HLD (hyperlipidemia)      Cataract      S/P liudmila      H/O lumpectomy  left      History of dilation and curettage      Status post cataract extraction and insertion of intraocular lens, left        Allergies:  penicillin (Unknown)  latex (Rash)    Home Medications Reviewed    SOCIAL HISTORY:  Denies ETOH,Smoking,   FAMILY HISTORY:        REVIEW OF SYSTEMS:  All other review of systems is negative unless indicated above.    PHYSICAL EXAM:  Constitutional: NAD, thin  HEENT: anicteric sclera, oropharynx clear, MMM  Neck: No JVD  Respiratory: CTAB, no wheezes, rales or rhonchi  Cardiovascular: S1, S2, RRR  Gastrointestinal: BS+, soft, NT/ND  Extremities: No cyanosis or clubbing. No peripheral edema  Neurological: confused   Psychiatric: Normal mood, normal affect  : No CVA tenderness. No jo.   Skin: No rashes    Hospital Medications:   MEDICATIONS  (STANDING):  aztreonam  IVPB 1000 milliGRAM(s) IV Intermittent every 12 hours  chlorhexidine 2% Cloths 1 Application(s) Topical <User Schedule>  heparin   Injectable 5000 Unit(s) SubCutaneous every 12 hours  lactated ringers. 1000 milliLiter(s) (60 mL/Hr) IV Continuous <Continuous>  rosuvastatin 5 milliGRAM(s) Oral at bedtime  sodium bicarbonate 650 milliGRAM(s) Oral every 8 hours        VITALS:  T(F): 98.5 (04-24-25 @ 12:47), Max: 99.3 (04-23-25 @ 20:05)  HR: 80 (04-24-25 @ 12:47)  BP: 154/65 (04-24-25 @ 12:47)  RR: 18 (04-24-25 @ 12:47)  SpO2: 97% (04-24-25 @ 12:47)  Wt(kg): --    Height (cm): 154.9 (04-24 @ 09:14)  Weight (kg): 74.8 (04-23 @ 20:05)  BMI (kg/m2): 31.2 (04-24 @ 09:14)  BSA (m2): 1.74 (04-24 @ 09:14)    LABS:  04-24    140  |  112[H]  |  66[HH]  ----------------------------<  147[H]  5.1[H]   |  17  |  1.7[H]    Ca    8.7      24 Apr 2025 06:32  Mg     2.0     04-24    TPro  5.9[L]  /  Alb  3.1[L]  /  TBili  <0.2  /  DBili      /  AST  81[H]  /  ALT  63[H]  /  AlkPhos  95  04-24                          8.1    8.19  )-----------( 231      ( 24 Apr 2025 06:32 )             25.5       Urine Studies:  Urinalysis Basic - ( 24 Apr 2025 06:32 )    Color:  / Appearance:  / SG:  / pH:   Gluc: 147 mg/dL / Ketone:   / Bili:  / Urobili:    Blood:  / Protein:  / Nitrite:    Leuk Esterase:  / RBC:  / WBC    Sq Epi:  / Non Sq Epi:  / Bacteria:           RADIOLOGY & ADDITIONAL STUDIES:      NEPHROLOGY CONSULTATION NOTE    75-year-old female past medical history of hyper tension hyperlipidemia brought in by  at bedside who is providing history for concern for UTI.  Patient's been having a slowly decreased decline in her mental status 4 days ago had a home test where she was found to have a UTI and it was positive. Her PCP prescribed nitrofurantoin 210 which patient is already took 4 doses however today noticed low grade fever which is what prompted the visit.  No chest pain, shortness of breath, nausea, vomiting, no flank pain.      PAST MEDICAL & SURGICAL HISTORY:  HTN (hypertension)      HLD (hyperlipidemia)      Cataract      S/P liudmila      H/O lumpectomy  left      History of dilation and curettage      Status post cataract extraction and insertion of intraocular lens, left        Allergies:  penicillin (Unknown)  latex (Rash)    Home Medications Reviewed    SOCIAL HISTORY:  Denies ETOH,Smoking,   FAMILY HISTORY:        REVIEW OF SYSTEMS:  All other review of systems is negative unless indicated above.    PHYSICAL EXAM:  Constitutional: NAD, thin  HEENT: anicteric sclera, oropharynx clear, MMM  Neck: No JVD  Respiratory: CTAB, no wheezes, rales or rhonchi  Cardiovascular: S1, S2, RRR  Gastrointestinal: BS+, soft, NT/ND  Extremities: No cyanosis or clubbing. No peripheral edema  Neurological: confused   Psychiatric: Normal mood, normal affect  : No CVA tenderness. No jo.   Skin: No rashes    Hospital Medications:   MEDICATIONS  (STANDING):  aztreonam  IVPB 1000 milliGRAM(s) IV Intermittent every 12 hours  chlorhexidine 2% Cloths 1 Application(s) Topical <User Schedule>  heparin   Injectable 5000 Unit(s) SubCutaneous every 12 hours  lactated ringers. 1000 milliLiter(s) (60 mL/Hr) IV Continuous <Continuous>  rosuvastatin 5 milliGRAM(s) Oral at bedtime  sodium bicarbonate 650 milliGRAM(s) Oral every 8 hours        VITALS:  T(F): 98.5 (04-24-25 @ 12:47), Max: 99.3 (04-23-25 @ 20:05)  HR: 80 (04-24-25 @ 12:47)  BP: 154/65 (04-24-25 @ 12:47)  RR: 18 (04-24-25 @ 12:47)  SpO2: 97% (04-24-25 @ 12:47)  Wt(kg): --    Height (cm): 154.9 (04-24 @ 09:14)  Weight (kg): 74.8 (04-23 @ 20:05)  BMI (kg/m2): 31.2 (04-24 @ 09:14)  BSA (m2): 1.74 (04-24 @ 09:14)    LABS:  04-24    140  |  112[H]  |  66[HH]  ----------------------------<  147[H]  5.1[H]   |  17  |  1.7[H]    Ca    8.7      24 Apr 2025 06:32  Mg     2.0     04-24    TPro  5.9[L]  /  Alb  3.1[L]  /  TBili  <0.2  /  DBili      /  AST  81[H]  /  ALT  63[H]  /  AlkPhos  95  04-24                          8.1    8.19  )-----------( 231      ( 24 Apr 2025 06:32 )             25.5       Urine Studies:  Urinalysis Basic - ( 24 Apr 2025 06:32 )    Color:  / Appearance:  / SG:  / pH:   Gluc: 147 mg/dL / Ketone:   / Bili:  / Urobili:    Blood:  / Protein:  / Nitrite:    Leuk Esterase:  / RBC:  / WBC    Sq Epi:  / Non Sq Epi:  / Bacteria:           RADIOLOGY & ADDITIONAL STUDIES:

## 2025-04-25 ENCOUNTER — TRANSCRIPTION ENCOUNTER (OUTPATIENT)
Age: 75
End: 2025-04-25

## 2025-04-25 LAB
A1C WITH ESTIMATED AVERAGE GLUCOSE RESULT: 6.3 % — HIGH (ref 4–5.6)
AMMONIA BLD-MCNC: 26 UMOL/L — SIGNIFICANT CHANGE UP (ref 11–55)
ANION GAP SERPL CALC-SCNC: 10 MMOL/L — SIGNIFICANT CHANGE UP (ref 7–14)
APPEARANCE UR: CLEAR — SIGNIFICANT CHANGE UP
BACTERIA # UR AUTO: NEGATIVE /HPF — SIGNIFICANT CHANGE UP
BASOPHILS # BLD AUTO: 0.01 K/UL — SIGNIFICANT CHANGE UP (ref 0–0.2)
BASOPHILS NFR BLD AUTO: 0.1 % — SIGNIFICANT CHANGE UP (ref 0–1)
BILIRUB UR-MCNC: NEGATIVE — SIGNIFICANT CHANGE UP
BUN SERPL-MCNC: 50 MG/DL — HIGH (ref 10–20)
CALCIUM SERPL-MCNC: 9 MG/DL — SIGNIFICANT CHANGE UP (ref 8.4–10.5)
CAST: 0 /LPF — SIGNIFICANT CHANGE UP (ref 0–4)
CHLORIDE SERPL-SCNC: 109 MMOL/L — SIGNIFICANT CHANGE UP (ref 98–110)
CHOLEST SERPL-MCNC: 106 MG/DL — SIGNIFICANT CHANGE UP
CO2 SERPL-SCNC: 20 MMOL/L — SIGNIFICANT CHANGE UP (ref 17–32)
COLOR SPEC: YELLOW — SIGNIFICANT CHANGE UP
CREAT ?TM UR-MCNC: 55 MG/DL — SIGNIFICANT CHANGE UP
CREAT SERPL-MCNC: 1.5 MG/DL — SIGNIFICANT CHANGE UP (ref 0.7–1.5)
CULTURE RESULTS: SIGNIFICANT CHANGE UP
DIFF PNL FLD: NEGATIVE — SIGNIFICANT CHANGE UP
EGFR: 36 ML/MIN/1.73M2 — LOW
EGFR: 36 ML/MIN/1.73M2 — LOW
EOSINOPHIL # BLD AUTO: 0.14 K/UL — SIGNIFICANT CHANGE UP (ref 0–0.7)
EOSINOPHIL NFR BLD AUTO: 1.7 % — SIGNIFICANT CHANGE UP (ref 0–8)
ESTIMATED AVERAGE GLUCOSE: 134 MG/DL — HIGH (ref 68–114)
GLUCOSE SERPL-MCNC: 90 MG/DL — SIGNIFICANT CHANGE UP (ref 70–99)
GLUCOSE UR QL: NEGATIVE MG/DL — SIGNIFICANT CHANGE UP
HCT VFR BLD CALC: 26.1 % — LOW (ref 37–47)
HDLC SERPL-MCNC: 46 MG/DL — LOW
HGB BLD-MCNC: 8.4 G/DL — LOW (ref 12–16)
IMM GRANULOCYTES NFR BLD AUTO: 0.9 % — HIGH (ref 0.1–0.3)
IRON SATN MFR SERPL: 15 UG/DL — LOW (ref 35–150)
IRON SATN MFR SERPL: 9 % — LOW (ref 15–50)
KETONES UR-MCNC: NEGATIVE MG/DL — SIGNIFICANT CHANGE UP
LDLC SERPL-MCNC: 44 MG/DL — SIGNIFICANT CHANGE UP
LEUKOCYTE ESTERASE UR-ACNC: ABNORMAL
LIPID PNL WITH DIRECT LDL SERPL: 44 MG/DL — SIGNIFICANT CHANGE UP
LYMPHOCYTES # BLD AUTO: 0.73 K/UL — LOW (ref 1.2–3.4)
LYMPHOCYTES # BLD AUTO: 9.1 % — LOW (ref 20.5–51.1)
MAGNESIUM SERPL-MCNC: 1.8 MG/DL — SIGNIFICANT CHANGE UP (ref 1.8–2.4)
MCHC RBC-ENTMCNC: 29.3 PG — SIGNIFICANT CHANGE UP (ref 27–31)
MCHC RBC-ENTMCNC: 32.2 G/DL — SIGNIFICANT CHANGE UP (ref 32–37)
MCV RBC AUTO: 90.9 FL — SIGNIFICANT CHANGE UP (ref 81–99)
MONOCYTES # BLD AUTO: 0.81 K/UL — HIGH (ref 0.1–0.6)
MONOCYTES NFR BLD AUTO: 10.1 % — HIGH (ref 1.7–9.3)
NEUTROPHILS # BLD AUTO: 6.29 K/UL — SIGNIFICANT CHANGE UP (ref 1.4–6.5)
NEUTROPHILS NFR BLD AUTO: 78.1 % — HIGH (ref 42.2–75.2)
NITRITE UR-MCNC: NEGATIVE — SIGNIFICANT CHANGE UP
NONHDLC SERPL-MCNC: 60 MG/DL — SIGNIFICANT CHANGE UP
NRBC BLD AUTO-RTO: 0 /100 WBCS — SIGNIFICANT CHANGE UP (ref 0–0)
OSMOLALITY UR: 629 MOS/KG — SIGNIFICANT CHANGE UP (ref 50–1200)
PH UR: 6 — SIGNIFICANT CHANGE UP (ref 5–8)
PHOSPHATE SERPL-MCNC: 3.1 MG/DL — SIGNIFICANT CHANGE UP (ref 2.1–4.9)
PLATELET # BLD AUTO: 247 K/UL — SIGNIFICANT CHANGE UP (ref 130–400)
PMV BLD: 11.6 FL — HIGH (ref 7.4–10.4)
POTASSIUM SERPL-MCNC: 5.4 MMOL/L — HIGH (ref 3.5–5)
POTASSIUM SERPL-SCNC: 5.4 MMOL/L — HIGH (ref 3.5–5)
PROT ?TM UR-MCNC: 37 MG/DL — SIGNIFICANT CHANGE UP
PROT UR-MCNC: 100 MG/DL
PROT/CREAT UR-RTO: 0.7 RATIO — HIGH (ref 0–0.2)
RBC # BLD: 2.87 M/UL — LOW (ref 4.2–5.4)
RBC # FLD: 13.6 % — SIGNIFICANT CHANGE UP (ref 11.5–14.5)
RBC CASTS # UR COMP ASSIST: 0 /HPF — SIGNIFICANT CHANGE UP (ref 0–4)
SODIUM SERPL-SCNC: 139 MMOL/L — SIGNIFICANT CHANGE UP (ref 135–146)
SODIUM UR-SCNC: 126 MMOL/L — SIGNIFICANT CHANGE UP
SP GR SPEC: 1.02 — SIGNIFICANT CHANGE UP (ref 1–1.03)
SPECIMEN SOURCE: SIGNIFICANT CHANGE UP
SQUAMOUS # UR AUTO: 0 /HPF — SIGNIFICANT CHANGE UP (ref 0–5)
TIBC SERPL-MCNC: 158 UG/DL — LOW (ref 220–430)
TRIGL SERPL-MCNC: 76 MG/DL — SIGNIFICANT CHANGE UP
TSH SERPL-MCNC: 1.33 UIU/ML — SIGNIFICANT CHANGE UP (ref 0.27–4.2)
UIBC SERPL-MCNC: 143 UG/DL — SIGNIFICANT CHANGE UP (ref 110–370)
UROBILINOGEN FLD QL: 0.2 MG/DL — SIGNIFICANT CHANGE UP (ref 0.2–1)
WBC # BLD: 8.05 K/UL — SIGNIFICANT CHANGE UP (ref 4.8–10.8)
WBC # FLD AUTO: 8.05 K/UL — SIGNIFICANT CHANGE UP (ref 4.8–10.8)
WBC UR QL: 2 /HPF — SIGNIFICANT CHANGE UP (ref 0–5)

## 2025-04-25 PROCEDURE — 95816 EEG AWAKE AND DROWSY: CPT | Mod: 26

## 2025-04-25 PROCEDURE — 99233 SBSQ HOSP IP/OBS HIGH 50: CPT

## 2025-04-25 PROCEDURE — 70553 MRI BRAIN STEM W/O & W/DYE: CPT | Mod: 26

## 2025-04-25 PROCEDURE — 74176 CT ABD & PELVIS W/O CONTRAST: CPT | Mod: 26

## 2025-04-25 PROCEDURE — 99222 1ST HOSP IP/OBS MODERATE 55: CPT

## 2025-04-25 RX ORDER — MIDAZOLAM IN 0.9 % SOD.CHLORID 1 MG/ML
2 PLASTIC BAG, INJECTION (ML) INTRAVENOUS ONCE
Refills: 0 | Status: DISCONTINUED | OUTPATIENT
Start: 2025-04-25 | End: 2025-04-25

## 2025-04-25 RX ORDER — SODIUM ZIRCONIUM CYCLOSILICATE 5 G/5G
10 POWDER, FOR SUSPENSION ORAL ONCE
Refills: 0 | Status: COMPLETED | OUTPATIENT
Start: 2025-04-25 | End: 2025-04-25

## 2025-04-25 RX ADMIN — ROSUVASTATIN CALCIUM 5 MILLIGRAM(S): 20 TABLET, FILM COATED ORAL at 22:37

## 2025-04-25 RX ADMIN — AZTREONAM 50 MILLIGRAM(S): 2 INJECTION, POWDER, LYOPHILIZED, FOR SOLUTION INTRAMUSCULAR; INTRAVENOUS at 05:21

## 2025-04-25 RX ADMIN — Medication 650 MILLIGRAM(S): at 13:29

## 2025-04-25 RX ADMIN — Medication 650 MILLIGRAM(S): at 05:21

## 2025-04-25 RX ADMIN — Medication 75 MILLILITER(S): at 15:06

## 2025-04-25 RX ADMIN — SODIUM ZIRCONIUM CYCLOSILICATE 10 GRAM(S): 5 POWDER, FOR SUSPENSION ORAL at 15:07

## 2025-04-25 RX ADMIN — Medication 650 MILLIGRAM(S): at 22:37

## 2025-04-25 RX ADMIN — Medication 1 APPLICATION(S): at 05:29

## 2025-04-25 RX ADMIN — AZTREONAM 50 MILLIGRAM(S): 2 INJECTION, POWDER, LYOPHILIZED, FOR SOLUTION INTRAMUSCULAR; INTRAVENOUS at 17:16

## 2025-04-25 RX ADMIN — HEPARIN SODIUM 5000 UNIT(S): 1000 INJECTION INTRAVENOUS; SUBCUTANEOUS at 05:21

## 2025-04-25 RX ADMIN — SODIUM CHLORIDE 60 MILLILITER(S): 9 INJECTION, SOLUTION INTRAVENOUS at 10:36

## 2025-04-25 RX ADMIN — Medication 2 MILLIGRAM(S): at 21:20

## 2025-04-25 RX ADMIN — HEPARIN SODIUM 5000 UNIT(S): 1000 INJECTION INTRAVENOUS; SUBCUTANEOUS at 17:14

## 2025-04-25 NOTE — CONSULT NOTE ADULT - ASSESSMENT
Pt is a 74yo Female with PMH of HTN, HLD p/w AMS, low grade fever, possible UTI-  c/s for left moderate hydro on US. US showed moderate left hydro with pvr 82 ml, nonspecific nonvisualization of ureteral jets. CT shows Lobulated pelvic masses, including 6.0 x 4.8 cm partially calcified mass and 7.5 x 5.0 cm mass, limited without intravenous contrast. Anterior 3.6 cm omental mass, possibly connected to adjacent lobulated pelvic mass. Moderate left hydronephrosis extending into pelvis, likely obstructed by pelvic mass. Perihepatic 6.2 x 5.0 cm irregular hypodensity/collection, limited in evaluation without contrast. Trace right pleural effusion with right basilar atelectasis. UA from 4/23- small leuks, +WBC. Repeat UA 4/25- trace leuks. WBC 8.05. Cr 1.5.    Plan:   - Trend Cr  - Will d/w attng

## 2025-04-25 NOTE — PROGRESS NOTE ADULT - ASSESSMENT
75-year-old female past medical history of hyper tension, hyperlipidemia brought in by  for worsening mental status.     #Suspected dementia r/o reversable causes  # UTI?  #left sided hydronephrosis  - UA - no bacteria  - Azetreonam   - f/u UCx and BCx  -CTH- noted  -  TSH, B12, RPR, ammonia -obtained, fu   - urology consult for new left sided hydro found on US  - neurology consult for worsening confusion  - CT abdomen ordered  - nephro following  - MRI brain ordered to eval mental status further    #Anemia  - MCV WNL  - Likely CKD related  - Iron, folate, b12    #HTN/HLD  - monitor; meds as appropriate     75-year-old female past medical history of hyper tension, hyperlipidemia brought in by  for worsening mental status.     #Suspected dementia r/o reversable causes  # UTI?  #left sided hydronephrosis  - UA - no bacteria  - Azetreonam   - f/u UCx and BCx  -CTH- noted  -  TSH, B12, RPR, ammonia -obtained, fu   - urology consult for new left sided hydro found on US  - neurology consult for worsening confusion  - CT abdomen  Lobulated pelvic masses, as above, limited without intravenous contrast.  Moderate left hydronephrosis extending into pelvis, likely obstructed by pelvic mass.  Perihepatic 6.2 x 5.0 cm irregular hypodensity/collection, limited in evaluation without contrast.  - nephro following  - MRI brain ordered to eval mental status further    #Anemia  - MCV WNL  - Likely CKD related  - Iron, folate, b12    #HTN/HLD  - monitor; meds as appropriate     75-year-old female past medical history of hyper tension, hyperlipidemia brought in by  for worsening mental status.     #Suspected dementia r/o reversable causes  # UTI?  #left sided hydronephrosis  - UA - no bacteria  - Azetreonam   - f/u UCx and BCx  -CTH- noted  -  TSH, B12, RPR, ammonia -obtained, fu   - urology consult for new left sided hydro found on US  - neurology consult for worsening confusion  - CT abdomen  Lobulated pelvic masses, as above, limited without intravenous contrast.  Moderate left hydronephrosis extending into pelvis, likely obstructed by pelvic mass.  Perihepatic 6.2 x 5.0 cm irregular hypodensity/collection, limited in evaluation without contrast.  - nephro following  - MRI brain ordered to eval mental status further  - gynonc consulted for CT abdomen findings 4/25  - obtain TVUS    #Anemia  - MCV WNL  - Likely CKD related  - Iron, folate, b12    #HTN/HLD  - monitor; meds as appropriate

## 2025-04-25 NOTE — CONSULT NOTE ADULT - SUBJECTIVE AND OBJECTIVE BOX
Pt is a 76yo Female with PMH of HTN, HLD p/w AMS, low grade fever, possible UTI-  c/s for left moderate hydro on US. Pt is a poor historian, currently getting worked up for suspected dementia. She reports she came in due to decreased appetite. She denies f/c, n/v/d, abd/flank pain, or issues urinating. According to the chart, pt was brought in by her  for low grade fever. She had tested positive for UTI with an at-home test, rx'ed Nitrofurantoin by PCP, taken 4 doses. She has had slowly decreased decline in mental status.      PAST MEDICAL & SURGICAL HISTORY:  HTN (hypertension)  HLD (hyperlipidemia)  Cataract  S/P liudmila  H/O lumpectomy  left  History of dilation and curettage  Status post cataract extraction and insertion of intraocular lens, left      MEDICATIONS  (STANDING):  aztreonam  IVPB 1000 milliGRAM(s) IV Intermittent every 12 hours  chlorhexidine 2% Cloths 1 Application(s) Topical <User Schedule>  heparin   Injectable 5000 Unit(s) SubCutaneous every 12 hours  lactated ringers. 1000 milliLiter(s) (60 mL/Hr) IV Continuous <Continuous>  rosuvastatin 5 milliGRAM(s) Oral at bedtime  sodium bicarbonate 650 milliGRAM(s) Oral every 8 hours    MEDICATIONS  (PRN):  acetaminophen     Tablet .. 650 milliGRAM(s) Oral every 6 hours PRN Temp greater or equal to 38C (100.4F), Mild Pain (1 - 3)  aluminum hydroxide/magnesium hydroxide/simethicone Suspension 30 milliLiter(s) Oral every 4 hours PRN Dyspepsia  melatonin 3 milliGRAM(s) Oral at bedtime PRN Insomnia  midazolam Injectable 2 milliGRAM(s) IV Push once PRN pre-MRI  ondansetron Injectable 4 milliGRAM(s) IV Push every 8 hours PRN Nausea and/or Vomiting      Allergies    penicillin (Unknown)  latex (Rash)    Intolerances        SOCIAL HISTORY: No illicit drug use    FAMILY HISTORY:      REVIEW OF SYSTEMS   [x] A ten-point review of systems was otherwise negative except as noted.      Vital Signs Last 24 Hrs  T(C): 36.6 (25 Apr 2025 04:30), Max: 37.6 (24 Apr 2025 19:42)  T(F): 97.9 (25 Apr 2025 04:30), Max: 99.6 (24 Apr 2025 19:42)  HR: 76 (25 Apr 2025 04:30) (76 - 82)  BP: 137/85 (25 Apr 2025 04:30) (137/85 - 146/63)  BP(mean): 103 (25 Apr 2025 04:30) (103 - 103)  RR: 18 (25 Apr 2025 04:30) (18 - 18)  SpO2: 97% (25 Apr 2025 04:30) (97% - 98%)    Parameters below as of 25 Apr 2025 04:30  Patient On (Oxygen Delivery Method): room air        PHYSICAL EXAM:    GEN: NAD, awake and alert.  SKIN: Good color, non diaphoretic.  RESP: Non-labored breathing. No use of accessory muscles.  ABDO: Soft, NT/ND, no palpable bladder, no suprapubic tenderness  BACK: No CVAT B/L  : No Hargrove  EXT: ARNOLD x 4      I&O's Summary      LABS:                        8.4    8.05  )-----------( 247      ( 25 Apr 2025 08:27 )             26.1     04-25    139  |  109  |  50[H]  ----------------------------<  90  5.4[H]   |  20  |  1.5    Ca    9.0      25 Apr 2025 08:27  Phos  3.1     04-25  Mg     1.8     04-25    TPro  5.9[L]  /  Alb  3.1[L]  /  TBili  <0.2  /  DBili  x   /  AST  81[H]  /  ALT  63[H]  /  AlkPhos  95  04-24      Urinalysis Basic - ( 25 Apr 2025 08:27 )    Color: x / Appearance: x / SG: x / pH: x  Gluc: 90 mg/dL / Ketone: x  / Bili: x / Urobili: x   Blood: x / Protein: x / Nitrite: x   Leuk Esterase: x / RBC: x / WBC x   Sq Epi: x / Non Sq Epi: x / Bacteria: x      RADIOLOGY & ADDITIONAL STUDIES:    < from: CT Abdomen and Pelvis No Cont (04.25.25 @ 11:54) >    ACC: [id number] EXAM:  CT ABDOMEN AND PELVIS   ORDERED BY: DAVE VALENCIA     PROCEDURE DATE:  04/25/2025          INTERPRETATION:  CLINICAL INFORMATION: Acute kidney injury    COMPARISON: None.    CONTRAST/COMPLICATIONS:  IV Contrast: NONE  Oral Contrast: NONE  Complications: None .    PROCEDURE:  CT of the Abdomen and Pelvis was performed.  Sagittal and coronal reformats were performed.    FINDINGS:  LOWER CHEST: Trace right effusion with right basilar opacity/atelectasis.    LIVER: Postsurgical changes. Lobulated liver contour is consistent with   cirrhotic changes. Perihepatic 6.2 x 5.0 cm irregular   hypodensity/collection, limited evaluation without contrast (series 4   image 169)  BILE DUCTS: Normal caliber.  GALLBLADDER: Not definitely visualized  SPLEEN: Splenomegaly.  PANCREAS: Within normal limits.  ADRENALS: Within normal limits.  KIDNEYS/URETERS: Moderate left hydronephrosis extending into pelvis    BLADDER: Minimally distended.  REPRODUCTIVE ORGANS: Lobulated pelvic masses including 6.0 x 4.8 cm   partially calcified mass (series 4 image 292) and 7.5 x 5.0 cm mass   (series 4 image 279)    BOWEL: No bowel obstruction. Colonic diverticulosis.  PERITONEUM/RETROPERITONEUM: No ascites. Anterior 3.6 cm omental mass   (series 4 image 257), possibly connected to adjacent lobulated pelvic   mass.  VESSELS: Atherosclerotic changes.  LYMPH NODES: Prominent retroperitoneal/periaortic lymph nodes,   nonspecific.  ABDOMINAL WALL: Within normal limits.  BONES: No suspicious lytic or blastic lesion. Degenerative changes of the   spine    IMPRESSION:    Lobulated pelvic masses, as above, limited without intravenous contrast.    Moderate left hydronephrosis extending into pelvis, likely obstructed by   pelvic mass.    Perihepatic 6.2 x 5.0 cm irregular hypodensity/collection, limited in   evaluation without contrast.    Trace right pleural effusion with right basilar atelectasis.    --- End of Report ---            ELLIOT LANDAU MD; Attending Radiologist  This document has been electronically signed. Apr 25 2025 12:30PM    < end of copied text >        < from: US Retroperitoneal Complete (04.24.25 @ 21:01) >    ACC: [id number] EXAM:  US RETROPERITONEAL COMPLETE   ORDERED BY: OBDULIA FRANCO     PROCEDURE DATE:  04/24/2025          INTERPRETATION:  CLINICAL INFORMATION: Acute kidney injury    COMPARISON: None available.    TECHNIQUE: Sonography of the kidneys and bladder.    FINDINGS:  Right kidney: Echogenic 8.6 cm. No renal mass, hydronephrosis or calculi.    Left kidney: Echogenic 10.8 cm Moderate hydronephrosis. No renal mass or   calculi.    Bilateral renal vascular flow    Urinary bladder: Urinary bladdervolume 82 mL. No intraluminal mass.   Nonvisualization of ureteral jets..    IMPRESSION:  1.  Left moderate hydronephrosis. Bilateral renal parenchymal   echogenicity/renal disease.    2.  Nonspecific nonvisualization of ureteral jets. Urinary bladder volume   82 mL. It is reported that the patient voided before the examination and   therefore, this is considered to represent a postvoid residual.    --- End of Report ---            ARIANA DILL MD; Attending Radiologist  This document has beenelectronically signed. Apr 25 2025  4:55AM    < end of copied text >   Pt is a 74yo Female with PMH of HTN, HLD p/w AMS, low grade fever, possible UTI-  c/s for left moderate hydro on US. Pt is a poor historian, currently getting worked up for suspected dementia. She reports she came in due to decreased appetite. She denies f/c, n/v/d, abd/flank pain, or issues urinating. According to the chart, pt was brought in by her  for low grade fever. She had tested positive for UTI with an at-home test, rx'ed Nitrofurantoin by PCP, taken 4 doses. She has had slowly decreased decline in mental status.      PAST MEDICAL & SURGICAL HISTORY:  HTN (hypertension)  HLD (hyperlipidemia)  Cataract  S/P liudmila  H/O lumpectomy  left  History of dilation and curettage  Status post cataract extraction and insertion of intraocular lens, left      MEDICATIONS  (STANDING):  aztreonam  IVPB 1000 milliGRAM(s) IV Intermittent every 12 hours  chlorhexidine 2% Cloths 1 Application(s) Topical <User Schedule>  heparin   Injectable 5000 Unit(s) SubCutaneous every 12 hours  lactated ringers. 1000 milliLiter(s) (60 mL/Hr) IV Continuous <Continuous>  rosuvastatin 5 milliGRAM(s) Oral at bedtime  sodium bicarbonate 650 milliGRAM(s) Oral every 8 hours    MEDICATIONS  (PRN):  acetaminophen     Tablet .. 650 milliGRAM(s) Oral every 6 hours PRN Temp greater or equal to 38C (100.4F), Mild Pain (1 - 3)  aluminum hydroxide/magnesium hydroxide/simethicone Suspension 30 milliLiter(s) Oral every 4 hours PRN Dyspepsia  melatonin 3 milliGRAM(s) Oral at bedtime PRN Insomnia  midazolam Injectable 2 milliGRAM(s) IV Push once PRN pre-MRI  ondansetron Injectable 4 milliGRAM(s) IV Push every 8 hours PRN Nausea and/or Vomiting      Allergies    penicillin (Unknown)  latex (Rash)    Intolerances        SOCIAL HISTORY: No illicit drug use    FAMILY HISTORY:      REVIEW OF SYSTEMS   [x] A ten-point review of systems was otherwise negative except as noted.      Vital Signs Last 24 Hrs  T(C): 36.6 (25 Apr 2025 04:30), Max: 37.6 (24 Apr 2025 19:42)  T(F): 97.9 (25 Apr 2025 04:30), Max: 99.6 (24 Apr 2025 19:42)  HR: 76 (25 Apr 2025 04:30) (76 - 82)  BP: 137/85 (25 Apr 2025 04:30) (137/85 - 146/63)  BP(mean): 103 (25 Apr 2025 04:30) (103 - 103)  RR: 18 (25 Apr 2025 04:30) (18 - 18)  SpO2: 97% (25 Apr 2025 04:30) (97% - 98%)    Parameters below as of 25 Apr 2025 04:30  Patient On (Oxygen Delivery Method): room air        PHYSICAL EXAM:    GEN: NAD, awake and alert.  SKIN: Good color, non diaphoretic.  RESP: Non-labored breathing. No use of accessory muscles.  ABDO: Soft, NT/ND, no palpable bladder, no suprapubic tenderness  BACK: No CVAT B/L  : No H[place]rove  EXT: ARNOLD x 4      I&O's Summary      LABS:                        8.4    8.05  )-----------( 247      ( 25 Apr 2025 08:27 )             26.1     04-25    139  |  109  |  50[H]  ----------------------------<  90  5.4[H]   |  20  |  1.5    Ca    9.0      25 Apr 2025 08:27  Phos  3.1     04-25  Mg     1.8     04-25    TPro  5.9[L]  /  Alb  3.1[L]  /  TBili  <0.2  /  DBili  x   /  AST  81[H]  /  ALT  63[H]  /  AlkPhos  95  04-24      Urinalysis (04.25.25 @ 05:01)    Glucose Qualitative, Urine: Negative mg/dL   Blood, Urine: Negative   pH Urine: 6.0   Color: Yellow   Urine Appearance: Clear   Bilirubin: Negative   Ketone - Urine: Negative mg/dL   Specific Gravity: 1.018   Protein, Urine: 100 mg/dL   Urobilinogen: 0.2 mg/dL   Nitrite: Negative   Leukocyte Esterase Concentration: Trace      Urine Microscopic-Add On (NC) (04.25.25 @ 05:01)    White Blood Cell - Urine: 2 /HPF   Red Blood Cell - Urine: 0 /HPF   Bacteria: Negative /HPF   Cast: 0 /LPF   Epithelial Cells: 0 /HPF        RADIOLOGY & ADDITIONAL STUDIES:    < from: CT Abdomen and Pelvis No Cont (04.25.25 @ 11:54) >    ACC: [id number] EXAM:  CT ABDOMEN AND PELVIS   ORDERED BY: DAVE VALENCIA     PROCEDURE DATE:  04/25/2025          INTERPRETATION:  CLINICAL INFORMATION: Acute kidney injury    COMPARISON: None.    CONTRAST/COMPLICATIONS:  IV Contrast: NONE  Oral Contrast: NONE  Complications: None .    PROCEDURE:  CT of the Abdomen and Pelvis was performed.  Sagittal and coronal reformats were performed.    FINDINGS:  LOWER CHEST: Trace right effusion with right basilar opacity/atelectasis.    LIVER: Postsurgical changes. Lobulated liver contour is consistent with   cirrhotic changes. Perihepatic 6.2 x 5.0 cm irregular   hypodensity/collection, limited evaluation without contrast (series 4   image 169)  BILE DUCTS: Normal caliber.  GALLBLADDER: Not definitely visualized  SPLEEN: Splenomegaly.  PANCREAS: Within normal limits.  ADRENALS: Within normal limits.  KIDNEYS/URETERS: Moderate left hydronephrosis extending into pelvis    BLADDER: Minimally distended.  REPRODUCTIVE ORGANS: Lobulated pelvic masses including 6.0 x 4.8 cm   partially calcified mass (series 4 image 292) and 7.5 x 5.0 cm mass   (series 4 image 279)    BOWEL: No bowel obstruction. Colonic diverticulosis.  PERITONEUM/RETROPERITONEUM: No ascites. Anterior 3.6 cm omental mass   (series 4 image 257), possibly connected to adjacent lobulated pelvic   mass.  VESSELS: Atherosclerotic changes.  LYMPH NODES: Prominent retroperitoneal/periaortic lymph nodes,   nonspecific.  ABDOMINAL WALL: Within normal limits.  BONES: No suspicious lytic or blastic lesion. Degenerative changes of the   spine    IMPRESSION:    Lobulated pelvic masses, as above, limited without intravenous contrast.    Moderate left hydronephrosis extending into pelvis, likely obstructed by   pelvic mass.    Perihepatic 6.2 x 5.0 cm irregular hypodensity/collection, limited in   evaluation without contrast.    Trace right pleural effusion with right basilar atelectasis.    --- End of Report ---            ELLIOT LANDAU MD; Attending Radiologist  This document has been electronically signed. Apr 25 2025 12:30PM    < end of copied text >        < from: US Retroperitoneal Complete (04.24.25 @ 21:01) >    ACC: [id number] EXAM:  US RETROPERITONEAL COMPLETE   ORDERED BY: OBDULIA FRANCO     PROCEDURE DATE:  04/24/2025          INTERPRETATION:  CLINICAL INFORMATION: Acute kidney injury    COMPARISON: None available.    TECHNIQUE: Sonography of the kidneys and bladder.    FINDINGS:  Right kidney: Echogenic 8.6 cm. No renal mass, hydronephrosis or calculi.    Left kidney: Echogenic 10.8 cm Moderate hydronephrosis. No renal mass or   calculi.    Bilateral renal vascular flow    Urinary bladder: Urinary bladdervolume 82 mL. No intraluminal mass.   Nonvisualization of ureteral jets..    IMPRESSION:  1.  Left moderate hydronephrosis. Bilateral renal parenchymal   echogenicity/renal disease.    2.  Nonspecific nonvisualization of ureteral jets. Urinary bladder volume   82 mL. It is reported that the patient voided before the examination and   therefore, this is considered to represent a postvoid residual.    --- End of Report ---            ARIANA DILL MD; Attending Radiologist  This document has beenelectronically signed. Apr 25 2025  4:55AM    < end of copied text >

## 2025-04-25 NOTE — PROGRESS NOTE ADULT - ASSESSMENT
acute kidney injury   - improving    CKD stage 3 (baseline Cr 1 and eGFR 58ml/min in 2024) with mild proteinuria  left hydronephrosis with lobulated pelvic mass   mild hyperkalemia  metabolic acidosis  AMS, subacute  wt loss / poor PO intake  new normocytic anemia (Hgb 11's 2024)  HTN    plan:    cont gentle hydration another 24 hours  hold olmesartan  cont po bicarb  lokelma PRN  consider dc abx   eval  pelvic mass work up   can proceed with CT with iv contrast if needed as renal function improving on IVF  f/u C3, C4, ANCA, TONY, anti-GBM, anti-DSDNA, SPEP, SFLC  f/u stool occult blood, b12, folate, tsat%, ferritin  d/w  and team  full code

## 2025-04-25 NOTE — CONSULT NOTE ADULT - SUBJECTIVE AND OBJECTIVE BOX
Chief Complaint: altered mental status    HPI:   74y/o G0 postmenopausal patient with PMHx of HTN, HLD, AMS, CKD with superimposed JAMIL is admitted with concern for UTI. Pt is A/Ox1, history obtained from .  explains Pt has had decreasing cognition, decreased appetite, and incontinence. Pt took an at home UTI test which returned positive. Presented to PCP, who prescribed nitrofurantoin. Pt took 4 doses without benefit and presented to the hospital. Also endorses history of constipation, weight loss (starting from March, pt has lost over 12 lb), and abdominal bloating.    Pt has not been to the OBGYN in multiple years, previously followed with Dr. Hardy, intended to make appointment with Dr. Savage but has not yet established care.     Ob/Gyn History:  G0                 LMP - postmenopausal   Denies history of ovarian cysts, uterine fibroids, abnormal paps, or STIs  Last Pap Smear - with Antony, unsure when  Last Mammogram - 1/2024  Last Colonoscopy - never      Home Medications:  olmesartan 20 mg oral tablet: 1 tab(s) orally once a day (17 Nov 2022 08:43)  rosuvastatin 5 mg oral capsule: 1 cap(s) orally once a day (17 Nov 2022 08:43)    Allergies  penicillin (Unknown)  latex (Rash)    PAST MEDICAL & SURGICAL HISTORY:  HTN (hypertension)  HLD (hyperlipidemia)  Cataract  S/P liudmila  H/O lumpectomy  left  History of dilation and curettage  Status post cataract extraction and insertion of intraocular lens, left    FAMILY HISTORY:  Denies known CA history, unsure of other    SOCIAL HISTORY: Occasional tobacco, occasional alcohol, denies illicit drug use    Vital Signs Last 24 Hrs  T(F): 98.1 (25 Apr 2025 12:51), Max: 99.6 (24 Apr 2025 19:42)  HR: 72 (25 Apr 2025 12:51) (72 - 82)  BP: 157/68 (25 Apr 2025 12:51) (137/85 - 157/68)  RR: 18 (25 Apr 2025 12:51) (18 - 18)    General Appearance - AAOx1, NAD  Abdomen - Soft, nontender, nondistended, no rebound, firm to palpation in all quadrants, no guarding  GYN/Pelvis:  Labia Majora - Normal  Labia Minora - Normal, atrophic  Clitoris - Normal  Urethra - Normal  Vagina - Normal, atrophic, no bleeding  Cervix - Normal, atrophic, no clear masses/lesions noted, no bleeding  Rectovaginal - moderate stool burden, no lesions/adhesions/nodules noted    Adnexa:  Masses - None  Tenderness - None    LABS:                        8.4    8.05  )-----------( 247      ( 25 Apr 2025 08:27 )             26.1         04-25    139  |  109  |  50[H]  ----------------------------<  90  5.4[H]   |  20  |  1.5    Ca    9.0      25 Apr 2025 08:27  Phos  3.1     04-25  Mg     1.8     04-25    TPro  5.9[L]  /  Alb  3.1[L]  /  TBili  <0.2  /  DBili  x   /  AST  81[H]  /  ALT  63[H]  /  AlkPhos  95  04-24      Urinalysis Basic - ( 25 Apr 2025 08:27 )    Color: x / Appearance: x / SG: x / pH: x  Gluc: 90 mg/dL / Ketone: x  / Bili: x / Urobili: x   Blood: x / Protein: x / Nitrite: x   Leuk Esterase: x / RBC: x / WBC x   Sq Epi: x / Non Sq Epi: x / Bacteria: x        Culture - Urine (collected 04-23-25 @ 21:04)  Source: Catheterized Catheterized  Final Report (04-25-25 @ 09:37):    <10,000 CFU/mL Normal Urogenital Willa        RADIOLOGY & ADDITIONAL STUDIES:  < from: CT Abdomen and Pelvis No Cont (04.25.25 @ 11:54) >    ACC: [id number] EXAM:  CT ABDOMEN AND PELVIS   ORDERED BY: DAVE VALENCIA     PROCEDURE DATE:  04/25/2025          INTERPRETATION:  CLINICAL INFORMATION: Acute kidney injury    COMPARISON: None.    CONTRAST/COMPLICATIONS:  IV Contrast: NONE  Oral Contrast: NONE  Complications: None .    PROCEDURE:  CT of the Abdomen and Pelvis was performed.  Sagittal and coronal reformats were performed.    FINDINGS:  LOWER CHEST: Trace right effusion with right basilar opacity/atelectasis.    LIVER: Postsurgical changes. Lobulated liver contour is consistent with   cirrhotic changes. Perihepatic 6.2 x 5.0 cm irregular   hypodensity/collection, limited evaluation without contrast (series 4   image 169)  BILE DUCTS: Normal caliber.  GALLBLADDER: Not definitely visualized  SPLEEN: Splenomegaly.  PANCREAS: Within normal limits.  ADRENALS: Within normal limits.  KIDNEYS/URETERS: Moderate left hydronephrosis extending into pelvis    BLADDER: Minimally distended.  REPRODUCTIVE ORGANS: Lobulated pelvic massesincluding 6.0 x 4.8 cm   partially calcified mass (series 4 image 292) and 7.5 x 5.0 cm mass   (series 4 image 279)    BOWEL: No bowel obstruction. Colonic diverticulosis.  PERITONEUM/RETROPERITONEUM: No ascites. Anterior 3.6 cm omental mass   (series 4 image 257), possibly connected to adjacent lobulated pelvic   mass.  VESSELS: Atherosclerotic changes.  LYMPH NODES: Prominent retroperitoneal/periaortic lymph nodes,   nonspecific.  ABDOMINAL WALL: Within normal limits.  BONES: No suspicious lytic or blastic lesion. Degenerative changes of the   spine    IMPRESSION:    Lobulated pelvic masses, as above, limited without intravenous contrast.    Moderate left hydronephrosis extending into pelvis, likely obstructed by   pelvic mass.    Perihepatic 6.2 x 5.0 cm irregular hypodensity/collection, limited in   evaluation without contrast.    Trace right pleural effusion with right basilar atelectasis.    --- End of Report ---            ELLIOT LANDAU MD; Attending Radiologist  This document has been electronically signed. Apr 25 2025 12:30PM    < end of copied text >   Chief Complaint: altered mental status    HPI:   76y/o G0 postmenopausal patient with PMHx of HTN, HLD, AMS, CKD with superimposed JAMIL is admitted with concern for UTI. Pt is A/Ox1, history obtained from .  explains Pt has had decreasing cognition, decreased appetite, and incontinence. Pt took an at home UTI test which returned positive. Presented to PCP, who prescribed nitrofurantoin. Pt took 4 doses without benefit and presented to the hospital. Also endorses history of constipation, weight loss (starting from March, pt has lost over 12 lb), and abdominal bloating.  Denies postmenopausal bleeding. Pt has not been to the OBGYN in multiple years, previously followed with Dr. Hardy, intended to make appointment with Dr. Savage but has not yet established care.     Ob/Gyn History:  G0                 LMP - postmenopausal   Denies history of ovarian cysts, uterine fibroids, abnormal paps, or STIs  Last Pap Smear - with Antony, unsure when  Last Mammogram - 1/2024  Last Colonoscopy - never      Home Medications:  olmesartan 20 mg oral tablet: 1 tab(s) orally once a day (17 Nov 2022 08:43)  rosuvastatin 5 mg oral capsule: 1 cap(s) orally once a day (17 Nov 2022 08:43)    Allergies  penicillin (Unknown)  latex (Rash)    PAST MEDICAL & SURGICAL HISTORY:  HTN (hypertension)  HLD (hyperlipidemia)  Cataract  S/P liudmila  H/O lumpectomy  left  History of dilation and curettage  Status post cataract extraction and insertion of intraocular lens, left    FAMILY HISTORY:  Denies known CA history, unsure of other    SOCIAL HISTORY: Occasional tobacco, occasional alcohol, denies illicit drug use    Vital Signs Last 24 Hrs  T(F): 98.1 (25 Apr 2025 12:51), Max: 99.6 (24 Apr 2025 19:42)  HR: 72 (25 Apr 2025 12:51) (72 - 82)  BP: 157/68 (25 Apr 2025 12:51) (137/85 - 157/68)  RR: 18 (25 Apr 2025 12:51) (18 - 18)    General Appearance - AAOx1, NAD  Abdomen - Soft, nontender, nondistended, no rebound, firm to palpation in all quadrants, no guarding  GYN/Pelvis:  Labia Majora - Normal  Labia Minora - Normal, atrophic  Clitoris - Normal  Urethra - Normal  Vagina - Normal, atrophic, no bleeding  Cervix - Normal, atrophic, no clear masses/lesions noted, no bleeding  Rectovaginal - moderate stool burden, no lesions/adhesions/nodules noted    Adnexa:  Masses - None  Tenderness - None    LABS:                        8.4    8.05  )-----------( 247      ( 25 Apr 2025 08:27 )             26.1         04-25    139  |  109  |  50[H]  ----------------------------<  90  5.4[H]   |  20  |  1.5    Ca    9.0      25 Apr 2025 08:27  Phos  3.1     04-25  Mg     1.8     04-25    TPro  5.9[L]  /  Alb  3.1[L]  /  TBili  <0.2  /  DBili  x   /  AST  81[H]  /  ALT  63[H]  /  AlkPhos  95  04-24      Urinalysis Basic - ( 25 Apr 2025 08:27 )    Color: x / Appearance: x / SG: x / pH: x  Gluc: 90 mg/dL / Ketone: x  / Bili: x / Urobili: x   Blood: x / Protein: x / Nitrite: x   Leuk Esterase: x / RBC: x / WBC x   Sq Epi: x / Non Sq Epi: x / Bacteria: x        Culture - Urine (collected 04-23-25 @ 21:04)  Source: Catheterized Catheterized  Final Report (04-25-25 @ 09:37):    <10,000 CFU/mL Normal Urogenital Willa        RADIOLOGY & ADDITIONAL STUDIES:  < from: CT Abdomen and Pelvis No Cont (04.25.25 @ 11:54) >    ACC: [id number] EXAM:  CT ABDOMEN AND PELVIS   ORDERED BY: DAVE VALENCIA     PROCEDURE DATE:  04/25/2025          INTERPRETATION:  CLINICAL INFORMATION: Acute kidney injury    COMPARISON: None.    CONTRAST/COMPLICATIONS:  IV Contrast: NONE  Oral Contrast: NONE  Complications: None .    PROCEDURE:  CT of the Abdomen and Pelvis was performed.  Sagittal and coronal reformats were performed.    FINDINGS:  LOWER CHEST: Trace right effusion with right basilar opacity/atelectasis.    LIVER: Postsurgical changes. Lobulated liver contour is consistent with   cirrhotic changes. Perihepatic 6.2 x 5.0 cm irregular   hypodensity/collection, limited evaluation without contrast (series 4   image 169)  BILE DUCTS: Normal caliber.  GALLBLADDER: Not definitely visualized  SPLEEN: Splenomegaly.  PANCREAS: Within normal limits.  ADRENALS: Within normal limits.  KIDNEYS/URETERS: Moderate left hydronephrosis extending into pelvis    BLADDER: Minimally distended.  REPRODUCTIVE ORGANS: Lobulated pelvic massesincluding 6.0 x 4.8 cm   partially calcified mass (series 4 image 292) and 7.5 x 5.0 cm mass   (series 4 image 279)    BOWEL: No bowel obstruction. Colonic diverticulosis.  PERITONEUM/RETROPERITONEUM: No ascites. Anterior 3.6 cm omental mass   (series 4 image 257), possibly connected to adjacent lobulated pelvic   mass.  VESSELS: Atherosclerotic changes.  LYMPH NODES: Prominent retroperitoneal/periaortic lymph nodes,   nonspecific.  ABDOMINAL WALL: Within normal limits.  BONES: No suspicious lytic or blastic lesion. Degenerative changes of the   spine    IMPRESSION:    Lobulated pelvic masses, as above, limited without intravenous contrast.    Moderate left hydronephrosis extending into pelvis, likely obstructed by   pelvic mass.    Perihepatic 6.2 x 5.0 cm irregular hypodensity/collection, limited in   evaluation without contrast.    Trace right pleural effusion with right basilar atelectasis.    --- End of Report ---            ELLIOT LANDAU MD; Attending Radiologist  This document has been electronically signed. Apr 25 2025 12:30PM    < end of copied text >

## 2025-04-25 NOTE — PROGRESS NOTE ADULT - ASSESSMENT
75-year-old female past medical history of hyper tension hyperlipidemia brought in by  at bedside who is providing history for concern for UTI.  Patient's been having a slowly decreased decline in her mental status 4 days ago had a home test where she was found to have a UTI and it was positive. Her PCP prescribed nitrofurantoin 210 which patient is already took 4 doses however today noticed low grade fever which is what prompted the visit.  No chest pain, shortness of breath, nausea, vomiting, no flank pain. Upon further history, pt with gradual decline in mental status for months. Did not see neurologist yet. Very poor historian    #JAMIL on CKD3  #Dementia vs paraneoplastic syndrome  #Rt hydro  #Pelvic and omental masses  CT A/p w/out contrast: lobulated liver contour is consistent with cirrhotic changes. Perihepatic 6.2 x 5.0 cm irregular hypodensity/collection. Moderate left hydronephrosis extending into pelvis, likely obstructed by pelvic mass. Anterior 3.6 cm omental mass, possibly connected to adjacent lobulated pelvic mass.  - s/p Azetreonam x3   - cont IVF   -CTH NG  -f/u TSH, B12, RPR, ammonia  -will obtain CT A/p w/ IV contrast if Cr cont to improve on 4/26. Meanwhile, GYN c/s, TVUS  -would also obtain MRI brain w/ and w/out to r/o mets    #Anemia  - MCV WNL  - Likely CKD related  - Iron, folate, b12, ferritin  - T+S    #HTN/HLD  - monitor; meds as appropriate    DVT: SQ hep  Diet: Renal  Act: Assist  Dispo: Med    #Progress Note Handoff  Pending: Clinical improvement and stability__x__MRI brain, CT A/p w/ IV contrast, GYN, TVUS  Pt/Family discussion: Pt/family informed and agree with the current plan  Disposition: Home_    My note supersedes the residents note should a discrepancy arise.    Chart and notes personally reviewed.  Care Discussed with Consultants/Other Providers/ Housestaff [ x] YES [ ] NO   Radiology, labs, old records personally reviewed.    discussed w/ housestaff, nursing, case management, renal    Attestation Statements:    Attestation Statements:  Risk Statement (NON-critical care).     On this date of service, level of risk to patient is considered: High.     Due to: pt with illness causing risk to life or bodily fxn    Time-based billing (NON-critical care).     50 minutes spent on total encounter. The necessity of the time spent during the encounter on this date of service was due to:     time spent on review of labs, imaging studies, old records, obtaining history, personally examining patient, counselling and communicating with patient/ family, entering orders for medications/tests/etc, discussions with other health care providers, documentation in electronic health records, independent interpretation of labs, imaging/procedure results and care coordination.

## 2025-04-25 NOTE — DISCHARGE NOTE NURSING/CASE MANAGEMENT/SOCIAL WORK - NSDCPEFALRISK_GEN_ALL_CORE
For information on Fall & Injury Prevention, visit: [email or web address] OR  [email or web address] OR  https://www.cdc.gov/steadi/patient.html

## 2025-04-25 NOTE — DISCHARGE NOTE NURSING/CASE MANAGEMENT/SOCIAL WORK - FINANCIAL ASSISTANCE
[facility] provides services at a reduced cost to those who are determined to be eligible through [facility]’s financial assistance program. Information regarding [facility]’s financial assistance program can be found by going to [email or web address] or by calling [phone].

## 2025-04-25 NOTE — CONSULT NOTE ADULT - ASSESSMENT
75-year-old female past medical history of hypertension, hyperlipidemia brought in by  at bedside for altered mental status. Patient alert and oriented to self and place, unable to state date. Patient unable to follow complex commands or remember 3 words after five minutes. Patient's labs significant for Hgb 9, K 5.4, BUN/Cr 71/1.9 on admission. Per nephrology note, patient with CKD stage 3. CT abdomen/pelvis showed lobulated pelvic masses, moderate left hydronephrosis extending into pelvis, and perihepatic 6.2 x 5.0 cm irregular hypodensity/collection. Patient's AMS likely secondary to toxic metabolic encephalopathy.     Recommendations:   -FU MRI head w/wo contrast  -Serum paraneoplastic labs   -FU reversible dementia labs 75-year-old female past medical history of hypertension, hyperlipidemia brought in by  at bedside for altered mental status. Patient alert and oriented to self and place, unable to state date. Patient unable to follow complex commands or remember 3 words after five minutes. Patient's labs significant for Hgb 9, K 5.4, BUN/Cr 71/1.9 on admission. Per nephrology note, patient with CKD stage 3. CT abdomen/pelvis showed lobulated pelvic masses, moderate left hydronephrosis extending into pelvis, and perihepatic 6.2 x 5.0 cm irregular hypodensity/collection. EEG with generalized slowing without interictal activity or seizures. Patient's AMS likely secondary to toxic metabolic encephalopathy, though given newly-found pelvic masses, paraneoplastic encephalitis can be considered (if masses neoplastic in nature)    Recommendations:   -MRI head w/wo contrast   -Serum paraneoplastic labs   -dementia labs: TSH, b12, folate, RPR, HIV  - delirium precautions  - depending on MRI findings and if patient continues to remain altered after resolution of active medical issues, LP can be considered

## 2025-04-25 NOTE — DISCHARGE NOTE NURSING/CASE MANAGEMENT/SOCIAL WORK - PATIENT PORTAL LINK FT
You can access the FollowMyHealth Patient Portal offered by [facility] by registering at the following website: [email or web address]. By joining [facility]’s FollowMyHealth portal, you will also be able to view your health information using other applications (apps) compatible with our system.

## 2025-04-25 NOTE — CONSULT NOTE ADULT - ATTENDING COMMENTS
Patient discussed and seen with the resident. All pertinent labs, medications, images, notes, vitals reviewed personally. I agree with the above assessment and plan, which I have reviewed and edited.
76 y/o with altered mental status for about 2 weeks as well as decreased appetite for 1 month.  Admitted to medical service, no acute CNS abnormalities, but on CT found to have a pelvic and omental mass as well as moderate L hydronephrosis.    On exam no acute findings     CT reviewed personally, limited by lack of contrast but has clear masses in the omentum and pelvis. Unclear if the pelvic mass is causing hydro.    Recommendations    Markers already sent  CT A/P with contrast  IR Bx of the omental mass    Once the origin of the tumor is established, therapy discussions could follow    40 min in [facility], review of the images and MIKE

## 2025-04-25 NOTE — CONSULT NOTE ADULT - ASSESSMENT
74y/o G0 postmenopausal patient with PMHx of HTN, HLD, AMS, CKD with JAMIL, presents for UTI and worsening mental status. GYN ONC consulted for incidentally found pelvic, omental, and perihepatic masses.     - Recommend ordering , , Inhibin A and B, CEA, HCG  - Recommend IR biopsy of omental or perihepatic nodules for tissue diagnosis  - GYN ONC to follow    Discussed with Dr. Lebron and Dr. Grey 76y/o G0 postmenopausal patient with PMHx of HTN, HLD, AMS, CKD with JAMIL, presents for UTI and worsening mental status. GYN ONC consulted for incidentally found pelvic, omental, and perihepatic masses.     - Recommend ordering , , Inhibin A and B, CEA, HCG  - Recommend IR biopsy of omental or perihepatic nodules for tissue diagnosis  - Continue current management per primary team  - GYN ONC to follow    Discussed with Dr. Lebron and Dr. Grey

## 2025-04-25 NOTE — EEG REPORT - NS EEG TEXT BOX
[place] [facility]  Inpatient Routine-EEG Report      Patient Name:	SOCORRO JOHN    :	1950  MRN:	-  Study Date/Time:	2025, 3:46:05 PM  Referred by:	-    Brief Clinical History:  SOCORRO JOHN is a 75 year old Female; study performed to investigate for seizures or markers of epilepsy.   Diagnosis Code: R40.4 Transient alteration of awareness      Patient Medication:  AZACTAM    HEPARIN    TYLENOL    MELATONIN    -    -      Acquisition Details:  Electroencephalography was acquired using a minimum of 21 channels on an [employer] Neurology system v 9.3.1 with electrode placement according to the standard International 10-20 system following ACNS (American Clinical Neurophysiology Society) guidelines.  Anterior temporal T1 and T2 electrodes were utilized whenever possible.   The [employer] automated spike & seizure detections were all reviewed in detail, in addition to the entire raw EEG.    Findings:  Background:  continuous.   Voltage:  Normal (20uV)  Organization:  Rudimentary  Posterior Dominant Rhythm:  <7 Hz Symmetric  Variability:  Yes	Reactivity:  Yes  Sleep:  Absent.  Focal abnormalities:  No persistent asymmetries of voltage or frequency.  Interictal Activity:  None  Focal Slowing:  None  Generalized Slowing:  Moderate  Events:  1)	No electrographic seizures or significant clinical events.  Provocations:  1)	Hyperventilation: was not performed.  2)	Photic stimulation: was not performed.  Impression:  Abnormal due to generalized slowing as above    Clinical Correlation:  Findings consistent with diffuse electrocerebral dysfunction secondary to nonspecific etiology    Victor Manuel Moraes MD  Attending Neurologist, Division of Epilepsy

## 2025-04-25 NOTE — CONSULT NOTE ADULT - SUBJECTIVE AND OBJECTIVE BOX
Neurology Consult Note     SOCORRO JOHN 75y Female [id number]  Hospital Day: 2d     HPI  75-year-old female past medical history of hypertension, hyperlipidemia brought in by  at bedside who is providing history for concern for UTI.  Patient's been having a slowly decreased decline in her mental status 4 days ago had a home test where she was found to have a UTI and it was positive. Her PCP prescribed nitrofurantoin 210 which patient is already took 4 doses however today noticed low grade fever which is what prompted the visit.  No chest pain, shortness of breath, nausea, vomiting, no flank pain.    · BP Systolic	 173 mm Hg  · BP Diastolic	79 mm Hg  · Heart Rate	88 /min  · Respiration Rate (breaths/min)	18 /min  · Temp (F)		99.3 Degrees F  · SpO2 (%)	97 % room air    WBC 8  Hgb 8.1  Albumin 3.3  BUN 71  Cr 1.9  eGFR 27  UA - no bacteria, but protein + WBC +, was already on Abx   (24 Apr 2025 08:56)    On neurology's evaluation, patient is alert and oriented to name and place. Patient gets flustered when asked about the date, and says that she can't think of it right now. Patient does not know why she is in the hospital, and just describes periods of being overwhelmed as the reason for admission. Patient's  at bedside states that patient has been having a decrease in mental status in the past 1-2 months. He gives an example of her episode of confusion, stating that she would do things like put aluminum foil in the microwave. He says that the patient was found to have a UTI on Monday and that they came to the hospital because someone close to them told them she "might have sepsis" due to her ongoing fever. Patient also endorses decreased appetite for the past 2 months with a ~12 pound weight loss.     PMH  Urinary tract infection    HTN (hypertension)    HLD (hyperlipidemia)    Cataract    S/P liudmila    H/O lumpectomy    History of dilation and curettage    Status post cataract extraction and insertion of intraocular lens, left        Vital Signs  T(F): 98.1 (12:51), Max: 99.6 (19:42)  HR: 72 (12:51) (72 - 82)  BP: 157/68 (12:51) (137/85 - 157/68)  RR: 18 (12:51) (18 - 18)  SpO2: 99% (12:51) (97% - 99%)    Neurological Exam:   Mental status: Awake, alert and oriented to person and place. Naming and repetition intact. Unable to follow complex commands. Unable to remember 3 words. Able to perform simple arithmetic. No dysarthria, no aphasia.   Cranial nerves:   - Eyes: PERRL, EOMI without nystagmus, Visual fields full   - Face: BL V1-V3 sensation intact symmetrically, face symmetric   - ENT: Hearing intact to voice, palate elevation symmetric, tongue was midline  Motor: No drift in all 4 extremities, 5/5 ZACARIAS&LE. Normal tone and bulk.  No abnormal movements.    Sensation: Intact to light touch.  Coordination: No dysmetria on finger-to-nose.  Reflexes: 2+ in bilateral UE/LE, downgoing toes bilaterally.  Gait: Deferred.      Labs:  WBC 8.05 /HGB 8.4 /MCV 90.9 /HCT 26.1 / / 04-25  WBC 8.19 /HGB 8.1 /MCV 93.4 /HCT 25.5 / / 04-24  WBC 9.35 /HGB 9.0 /MCV 91.1 /HCT 27.8 / / 04-23    04-25    139  |  109  |  50[H]  ----------------------------<  90  5.4[H]   |  20  |  1.5    Ca    9.0      25 Apr 2025 08:27  Phos  3.1     04-25  Mg     1.8     04-25    TPro  5.9[L]  /  Alb  3.1[L]  /  TBili  <0.2  /  DBili  x   /  AST  81[H]  /  ALT  63[H]  /  AlkPhos  95  04-24    LIVER FUNCTIONS - ( 24 Apr 2025 06:32 )  Alb: 3.1 g/dL / Pro: 5.9 g/dL / ALK PHOS: 95 U/L / ALT: 63 U/L / AST: 81 U/L / GGT: x             Urinalysis Basic - ( 25 Apr 2025 08:27 )    Color: x / Appearance: x / SG: x / pH: x  Gluc: 90 mg/dL / Ketone: x  / Bili: x / Urobili: x   Blood: x / Protein: x / Nitrite: x   Leuk Esterase: x / RBC: x / WBC x   Sq Epi: x / Non Sq Epi: x / Bacteria: x        Medications:  acetaminophen     Tablet .. 650 milliGRAM(s) Oral every 6 hours PRN  aluminum hydroxide/magnesium hydroxide/simethicone Suspension 30 milliLiter(s) Oral every 4 hours PRN  aztreonam  IVPB 1000 milliGRAM(s) IV Intermittent every 12 hours  chlorhexidine 2% Cloths 1 Application(s) Topical <User Schedule>  heparin   Injectable 5000 Unit(s) SubCutaneous every 12 hours  lactated ringers. 1000 milliLiter(s) IV Continuous <Continuous>  melatonin 3 milliGRAM(s) Oral at bedtime PRN  midazolam Injectable 2 milliGRAM(s) IV Push once PRN  ondansetron Injectable 4 milliGRAM(s) IV Push every 8 hours PRN  rosuvastatin 5 milliGRAM(s) Oral at bedtime  sodium bicarbonate 650 milliGRAM(s) Oral every 8 hours      Neuroimaging:  CT Head No Cont:   ACC: [id number] EXAM:  CT BRAIN   ORDERED BY: JUAN MUNOZ     PROCEDURE DATE:  04/24/2025          INTERPRETATION:  CLINICAL INDICATION: Confusion.    TECHNIQUE: CT of the head was performed without the administration of   intravenous contrast.    COMPARISON: CT head 12/8/2019.    FINDINGS:    Ventricles and sulci appropriate for age.    No acute territorial infarct, hemorrhage or significant midline shift.    Vascular calcifications. Periventricular and subcortical white matter   hypodensities reflecting mild chronic microvascular ischemic changes.    Paranasal sinuses and mastoid air cells are aerated. No acute fracture.    Visualized intraorbital contents appear normal.    IMPRESSION:    No acute intracranial pathology.    --- End of Report ---          HANNAH WERNER MD; Resident Radiologist  This document has been electronically signed.  ISABEL GARCIA MD; Attending Interventional Radiologist  This document has been electronically signed. Apr 24 2025  5:46PM (04-24-25 @ 16:31)

## 2025-04-26 LAB
ALBUMIN SERPL ELPH-MCNC: 2.9 G/DL — LOW (ref 3.5–5.2)
ALP SERPL-CCNC: 94 U/L — SIGNIFICANT CHANGE UP (ref 30–115)
ALT FLD-CCNC: 67 U/L — HIGH (ref 0–41)
ANION GAP SERPL CALC-SCNC: 11 MMOL/L — SIGNIFICANT CHANGE UP (ref 7–14)
AST SERPL-CCNC: 90 U/L — HIGH (ref 0–41)
BASOPHILS # BLD AUTO: 0.01 K/UL — SIGNIFICANT CHANGE UP (ref 0–0.2)
BASOPHILS NFR BLD AUTO: 0.1 % — SIGNIFICANT CHANGE UP (ref 0–1)
BILIRUB SERPL-MCNC: 0.5 MG/DL — SIGNIFICANT CHANGE UP (ref 0.2–1.2)
BUN SERPL-MCNC: 44 MG/DL — HIGH (ref 10–20)
CALCIUM SERPL-MCNC: 8.8 MG/DL — SIGNIFICANT CHANGE UP (ref 8.4–10.5)
CANCER AG125 SERPL-ACNC: 9271 U/ML — HIGH
CANCER AG19-9 SERPL-ACNC: 37 U/ML — HIGH
CEA SERPL-MCNC: 2.1 NG/ML — SIGNIFICANT CHANGE UP (ref 0–3.8)
CHLORIDE SERPL-SCNC: 109 MMOL/L — SIGNIFICANT CHANGE UP (ref 98–110)
CO2 SERPL-SCNC: 19 MMOL/L — SIGNIFICANT CHANGE UP (ref 17–32)
CREAT SERPL-MCNC: 1.5 MG/DL — SIGNIFICANT CHANGE UP (ref 0.7–1.5)
DSDNA AB SER-ACNC: 1 IU/ML — SIGNIFICANT CHANGE UP
EGFR: 36 ML/MIN/1.73M2 — LOW
EGFR: 36 ML/MIN/1.73M2 — LOW
EOSINOPHIL # BLD AUTO: 0.1 K/UL — SIGNIFICANT CHANGE UP (ref 0–0.7)
EOSINOPHIL NFR BLD AUTO: 1.4 % — SIGNIFICANT CHANGE UP (ref 0–8)
FERRITIN SERPL-MCNC: 575 NG/ML — HIGH (ref 13–330)
FOLATE SERPL-MCNC: >20 NG/ML — SIGNIFICANT CHANGE UP
GBM IGG SER-ACNC: 0.7 AI — SIGNIFICANT CHANGE UP
GLOMERULAR BASEMENT MEMBRANE A INTERPRETATION: NEGATIVE — SIGNIFICANT CHANGE UP
GLUCOSE SERPL-MCNC: 87 MG/DL — SIGNIFICANT CHANGE UP (ref 70–99)
HCG SERPL-ACNC: <1 MIU/ML — SIGNIFICANT CHANGE UP
HCT VFR BLD CALC: 25 % — LOW (ref 37–47)
HGB BLD-MCNC: 8.1 G/DL — LOW (ref 12–16)
IMM GRANULOCYTES NFR BLD AUTO: 1.1 % — HIGH (ref 0.1–0.3)
LYMPHOCYTES # BLD AUTO: 0.49 K/UL — LOW (ref 1.2–3.4)
LYMPHOCYTES # BLD AUTO: 6.8 % — LOW (ref 20.5–51.1)
MCHC RBC-ENTMCNC: 29.6 PG — SIGNIFICANT CHANGE UP (ref 27–31)
MCHC RBC-ENTMCNC: 32.4 G/DL — SIGNIFICANT CHANGE UP (ref 32–37)
MCV RBC AUTO: 91.2 FL — SIGNIFICANT CHANGE UP (ref 81–99)
MONOCYTES # BLD AUTO: 0.76 K/UL — HIGH (ref 0.1–0.6)
MONOCYTES NFR BLD AUTO: 10.6 % — HIGH (ref 1.7–9.3)
NEUTROPHILS # BLD AUTO: 5.74 K/UL — SIGNIFICANT CHANGE UP (ref 1.4–6.5)
NEUTROPHILS NFR BLD AUTO: 80 % — HIGH (ref 42.2–75.2)
NRBC BLD AUTO-RTO: 0 /100 WBCS — SIGNIFICANT CHANGE UP (ref 0–0)
PLATELET # BLD AUTO: 244 K/UL — SIGNIFICANT CHANGE UP (ref 130–400)
PMV BLD: 11.5 FL — HIGH (ref 7.4–10.4)
POTASSIUM SERPL-MCNC: 5.2 MMOL/L — HIGH (ref 3.5–5)
POTASSIUM SERPL-SCNC: 5.2 MMOL/L — HIGH (ref 3.5–5)
PROT SERPL-MCNC: 6.1 G/DL — SIGNIFICANT CHANGE UP (ref 6–8)
PROT SERPL-MCNC: 6.2 G/DL — SIGNIFICANT CHANGE UP (ref 6–8.3)
RBC # BLD: 2.74 M/UL — LOW (ref 4.2–5.4)
RBC # FLD: 13.4 % — SIGNIFICANT CHANGE UP (ref 11.5–14.5)
SODIUM SERPL-SCNC: 139 MMOL/L — SIGNIFICANT CHANGE UP (ref 135–146)
T PALLIDUM AB TITR SER: NEGATIVE — SIGNIFICANT CHANGE UP
VIT B12 SERPL-MCNC: 974 PG/ML — SIGNIFICANT CHANGE UP (ref 232–1245)
WBC # BLD: 7.18 K/UL — SIGNIFICANT CHANGE UP (ref 4.8–10.8)
WBC # FLD AUTO: 7.18 K/UL — SIGNIFICANT CHANGE UP (ref 4.8–10.8)

## 2025-04-26 PROCEDURE — 76856 US EXAM PELVIC COMPLETE: CPT | Mod: 26

## 2025-04-26 PROCEDURE — 76830 TRANSVAGINAL US NON-OB: CPT | Mod: 26

## 2025-04-26 PROCEDURE — 99232 SBSQ HOSP IP/OBS MODERATE 35: CPT

## 2025-04-26 RX ADMIN — HEPARIN SODIUM 5000 UNIT(S): 1000 INJECTION INTRAVENOUS; SUBCUTANEOUS at 17:28

## 2025-04-26 RX ADMIN — Medication 650 MILLIGRAM(S): at 21:16

## 2025-04-26 RX ADMIN — AZTREONAM 50 MILLIGRAM(S): 2 INJECTION, POWDER, LYOPHILIZED, FOR SOLUTION INTRAMUSCULAR; INTRAVENOUS at 05:33

## 2025-04-26 RX ADMIN — HEPARIN SODIUM 5000 UNIT(S): 1000 INJECTION INTRAVENOUS; SUBCUTANEOUS at 05:33

## 2025-04-26 RX ADMIN — Medication 650 MILLIGRAM(S): at 05:33

## 2025-04-26 RX ADMIN — ROSUVASTATIN CALCIUM 5 MILLIGRAM(S): 20 TABLET, FILM COATED ORAL at 21:16

## 2025-04-26 RX ADMIN — Medication 650 MILLIGRAM(S): at 13:26

## 2025-04-26 RX ADMIN — Medication 1 APPLICATION(S): at 05:38

## 2025-04-26 NOTE — PROGRESS NOTE ADULT - ASSESSMENT
75-year-old female past medical history of hyper tension, hyperlipidemia brought in by  for worsening mental status.     #Suspected dementia r/o reversable causes  # UTI?  #left sided hydronephrosis  - UA - no bacteria  - Azetreonam   - f/u UCx and BCx  -CTH- noted  -  TSH, B12, RPR, ammonia -obtained, fu   - urology consult for new left sided hydro found on US  - neurology consult for worsening confusion  - CT abdomen  Lobulated pelvic masses, as above, limited without intravenous contrast.  Moderate left hydronephrosis extending into pelvis, likely obstructed by pelvic mass.  Perihepatic 6.2 x 5.0 cm irregular hypodensity/collection, limited in evaluation without contrast.  - nephro following  - MRI brain ordered to eval mental status further: No acute intracranial pathology or abnormal enhancement.  - gynonc consulted for CT abdomen findings 4/25:  Recommend ordering , , Inhibin A and B, CEA, HCG Recommend IR biopsy of omental or perihepatic nodules for tissue diagnosis  - obtain TVUS  - IR consult: Patient would benefit from a post con CT to better determine nature of these masses for biopsy targetability.  Workup/biopsy can be done as outpatient.    #Anemia  - MCV WNL  - Likely CKD related  - Iron (total 15 TIBC 158 %sat 9 ferritin 575, folate >20, b12 974    #HTN/HLD  - monitor; meds as appropriate

## 2025-04-26 NOTE — CONSULT NOTE ADULT - SUBJECTIVE AND OBJECTIVE BOX
INTERVENTIONAL RADIOLOGY CONSULT:     Procedure Requested: Biopsy    HPI:  75-year-old female past medical history of hyper tension hyperlipidemia brought in by  at bedside who is providing history for concern for UTI.  Patient's been having a slowly decreased decline in her mental status 4 days ago had a home test where she was found to have a UTI and it was positive. Her PCP prescribed nitrofurantoin 210 which patient is already took 4 doses however today noticed low grade fever which is what prompted the visit.  No chest pain, shortness of breath, nausea, vomiting, no flank pain.    · BP Systolic	 173 mm Hg  · BP Diastolic	79 mm Hg  · Heart Rate	88 /min  · Respiration Rate (breaths/min)	18 /min  · Temp (F)		99.3 Degrees F  · SpO2 (%)	97 % room air    WBC 8  Hgb 8.1  Albumin 3.3  BUN 71  Cr 1.9  eGFR 27  UA - no bacteria, but protein + WBC +, was already on Abx   (24 Apr 2025 08:56)      PAST MEDICAL & SURGICAL HISTORY:  HTN (hypertension)      HLD (hyperlipidemia)      Cataract      S/P liudmila      H/O lumpectomy  left      History of dilation and curettage      Status post cataract extraction and insertion of intraocular lens, left          MEDICATIONS  (STANDING):  aztreonam  IVPB 1000 milliGRAM(s) IV Intermittent every 12 hours  chlorhexidine 2% Cloths 1 Application(s) Topical <User Schedule>  heparin   Injectable 5000 Unit(s) SubCutaneous every 12 hours  rosuvastatin 5 milliGRAM(s) Oral at bedtime  sodium bicarbonate 650 milliGRAM(s) Oral every 8 hours  sodium chloride 0.9%. 1000 milliLiter(s) (75 mL/Hr) IV Continuous <Continuous>    MEDICATIONS  (PRN):  acetaminophen     Tablet .. 650 milliGRAM(s) Oral every 6 hours PRN Temp greater or equal to 38C (100.4F), Mild Pain (1 - 3)  aluminum hydroxide/magnesium hydroxide/simethicone Suspension 30 milliLiter(s) Oral every 4 hours PRN Dyspepsia  melatonin 3 milliGRAM(s) Oral at bedtime PRN Insomnia  ondansetron Injectable 4 milliGRAM(s) IV Push every 8 hours PRN Nausea and/or Vomiting      Allergies    penicillin (Unknown)  latex (Rash)      Physical Exam:   Vital Signs Last 24 Hrs  T(C): 37 (26 Apr 2025 04:52), Max: 37 (26 Apr 2025 04:52)  T(F): 98.6 (26 Apr 2025 04:52), Max: 98.6 (26 Apr 2025 04:52)  HR: 79 (26 Apr 2025 04:52) (71 - 79)  BP: 141/57 (26 Apr 2025 04:52) (141/57 - 157/68)  BP(mean): --  RR: 18 (26 Apr 2025 04:52) (18 - 18)  SpO2: 98% (26 Apr 2025 04:52) (98% - 99%)    Parameters below as of 26 Apr 2025 04:52  Patient On (Oxygen Delivery Method): room air    Labs:                         8.1    7.18  )-----------( 244      ( 26 Apr 2025 07:23 )             25.0     04-26    139  |  109  |  44[H]  ----------------------------<  87  5.2[H]   |  19  |  1.5    Ca    8.8      26 Apr 2025 07:23  Phos  3.1     04-25  Mg     1.8     04-25    TPro  6.1  /  Alb  2.9[L]  /  TBili  0.5  /  DBili  x   /  AST  90[H]  /  ALT  67[H]  /  AlkPhos  94  04-26      Radiology & Additional Studies:     Radiology imaging reviewed.       ASSESSMENT/ PLAN:     Patient would benefit from a post con CT to better determine nature of these masses for biopsy targetability.  Workup/biopsy can be done as outpatient.    D/W house staff.    Thank you for the courtesy of this consult, please call [phone] ([facility]) with any further questions.

## 2025-04-26 NOTE — PROGRESS NOTE ADULT - ASSESSMENT
75-year-old female past medical history of hyper tension hyperlipidemia brought in by  at bedside who is providing history for concern for UTI.  Patient's been having a slowly decreased decline in her mental status 4 days ago had a home test where she was found to have a UTI and it was positive. Her PCP prescribed nitrofurantoin 210 which patient is already took 4 doses however today noticed low grade fever which is what prompted the visit.  No chest pain, shortness of breath, nausea, vomiting, no flank pain. Upon further history, pt with gradual decline in mental status for months. Did not see neurologist yet. Very poor historian      #Acute change in mental status: concern for dementia? vs paraneoplastic syndrome vs metabolic encephalopathy   Appreciate neurology input: Most likely presentation consistent with toxic or metabolic encephalopathy, but given the finding of the new masses paraneoplastic encephalitis on the differential  Patient had routine EEG with generalized slowing  MRI brain with and without contrast no acute pathology as above  Today patient mental status is better: Able to have full conversation and she is showing good insight about what is going on from the medical standpoint  -f/u TSH, B12, RPR, ammonia    #Rt hydro  #Pelvic and omental masses  #JAMIL on CKD3  CT A/p w/out contrast: lobulated liver contour is consistent with cirrhotic changes. Perihepatic 6.2 x 5.0 cm irregular hypodensity/collection. Moderate left hydronephrosis extending into pelvis, likely obstructed by pelvic mass. Anterior 3.6 cm omental mass, possibly connected to adjacent lobulated pelvic mass.  - s/p Azetreonam x3 , Urine culture is negative, DC antibiotic  - cont IVF   -  TVUS  Appreciate GYN oncology, urology and interventional radiology input.    Tumor markers were sent  Housestaff discussed with interventional radiology, will plan to get CT abdomen pelvis with IV contrast  It seems like the pelvic mass could be compressing the left ureter resulting in the moderate hydroureteronephrosis  Given that the patient presented with JAMIL that has been slowly improving, and received a contrast for MRI head yesterday  Will postpone getting CT abdomen pelvis with IV contrast till tomorrow April 27, also will plan to give IV fluid before and after the study  Once the study is obtained, we will rediscussed with interventional radiology, and urolog: Biopsy planning, need for left PCN to relieve the pressure    #Anemia  - MCV WNL  - Likely CKD related  - Iron, folate, b12, ferritin  - T+S    #HTN/HLD  - monitor; meds as appropriate    DVT: SQ hep  Diet: Renal  Act: Assist  Dispo: Med    discussed with patient and

## 2025-04-27 LAB
ALBUMIN SERPL ELPH-MCNC: 2.8 G/DL — LOW (ref 3.5–5.2)
ALP SERPL-CCNC: 94 U/L — SIGNIFICANT CHANGE UP (ref 30–115)
ALT FLD-CCNC: 77 U/L — HIGH (ref 0–41)
ANION GAP SERPL CALC-SCNC: 11 MMOL/L — SIGNIFICANT CHANGE UP (ref 7–14)
AST SERPL-CCNC: 102 U/L — HIGH (ref 0–41)
BASOPHILS # BLD AUTO: 0.01 K/UL — SIGNIFICANT CHANGE UP (ref 0–0.2)
BASOPHILS NFR BLD AUTO: 0.1 % — SIGNIFICANT CHANGE UP (ref 0–1)
BILIRUB SERPL-MCNC: 0.4 MG/DL — SIGNIFICANT CHANGE UP (ref 0.2–1.2)
BUN SERPL-MCNC: 44 MG/DL — HIGH (ref 10–20)
CALCIUM SERPL-MCNC: 8.4 MG/DL — SIGNIFICANT CHANGE UP (ref 8.4–10.5)
CHLORIDE SERPL-SCNC: 109 MMOL/L — SIGNIFICANT CHANGE UP (ref 98–110)
CO2 SERPL-SCNC: 19 MMOL/L — SIGNIFICANT CHANGE UP (ref 17–32)
CREAT SERPL-MCNC: 1.6 MG/DL — HIGH (ref 0.7–1.5)
EGFR: 33 ML/MIN/1.73M2 — LOW
EGFR: 33 ML/MIN/1.73M2 — LOW
EOSINOPHIL # BLD AUTO: 0.05 K/UL — SIGNIFICANT CHANGE UP (ref 0–0.7)
EOSINOPHIL NFR BLD AUTO: 0.7 % — SIGNIFICANT CHANGE UP (ref 0–8)
GLUCOSE SERPL-MCNC: 94 MG/DL — SIGNIFICANT CHANGE UP (ref 70–99)
HCT VFR BLD CALC: 25.4 % — LOW (ref 37–47)
HGB BLD-MCNC: 8 G/DL — LOW (ref 12–16)
IMM GRANULOCYTES NFR BLD AUTO: 1.6 % — HIGH (ref 0.1–0.3)
LYMPHOCYTES # BLD AUTO: 0.52 K/UL — LOW (ref 1.2–3.4)
LYMPHOCYTES # BLD AUTO: 7.5 % — LOW (ref 20.5–51.1)
MCHC RBC-ENTMCNC: 28.9 PG — SIGNIFICANT CHANGE UP (ref 27–31)
MCHC RBC-ENTMCNC: 31.5 G/DL — LOW (ref 32–37)
MCV RBC AUTO: 91.7 FL — SIGNIFICANT CHANGE UP (ref 81–99)
MONOCYTES # BLD AUTO: 0.84 K/UL — HIGH (ref 0.1–0.6)
MONOCYTES NFR BLD AUTO: 12.2 % — HIGH (ref 1.7–9.3)
NEUTROPHILS # BLD AUTO: 5.36 K/UL — SIGNIFICANT CHANGE UP (ref 1.4–6.5)
NEUTROPHILS NFR BLD AUTO: 77.9 % — HIGH (ref 42.2–75.2)
NRBC BLD AUTO-RTO: 0 /100 WBCS — SIGNIFICANT CHANGE UP (ref 0–0)
PLATELET # BLD AUTO: 254 K/UL — SIGNIFICANT CHANGE UP (ref 130–400)
PMV BLD: 11.9 FL — HIGH (ref 7.4–10.4)
POTASSIUM SERPL-MCNC: 4.8 MMOL/L — SIGNIFICANT CHANGE UP (ref 3.5–5)
POTASSIUM SERPL-SCNC: 4.8 MMOL/L — SIGNIFICANT CHANGE UP (ref 3.5–5)
PROT SERPL-MCNC: 5.8 G/DL — LOW (ref 6–8)
RBC # BLD: 2.77 M/UL — LOW (ref 4.2–5.4)
RBC # FLD: 13.2 % — SIGNIFICANT CHANGE UP (ref 11.5–14.5)
SODIUM SERPL-SCNC: 139 MMOL/L — SIGNIFICANT CHANGE UP (ref 135–146)
WBC # BLD: 6.89 K/UL — SIGNIFICANT CHANGE UP (ref 4.8–10.8)
WBC # FLD AUTO: 6.89 K/UL — SIGNIFICANT CHANGE UP (ref 4.8–10.8)

## 2025-04-27 PROCEDURE — 99232 SBSQ HOSP IP/OBS MODERATE 35: CPT

## 2025-04-27 PROCEDURE — 74177 CT ABD & PELVIS W/CONTRAST: CPT | Mod: 26

## 2025-04-27 RX ORDER — IOHEXOL 350 MG/ML
30 INJECTION, SOLUTION INTRAVENOUS ONCE
Refills: 0 | Status: COMPLETED | OUTPATIENT
Start: 2025-04-27 | End: 2025-04-27

## 2025-04-27 RX ORDER — SODIUM CHLORIDE 9 G/1000ML
1000 INJECTION, SOLUTION INTRAVENOUS
Refills: 0 | Status: DISCONTINUED | OUTPATIENT
Start: 2025-04-27 | End: 2025-05-02

## 2025-04-27 RX ADMIN — IOHEXOL 30 MILLILITER(S): 350 INJECTION, SOLUTION INTRAVENOUS at 11:24

## 2025-04-27 RX ADMIN — Medication 1 APPLICATION(S): at 05:29

## 2025-04-27 RX ADMIN — SODIUM CHLORIDE 75 MILLILITER(S): 9 INJECTION, SOLUTION INTRAVENOUS at 11:24

## 2025-04-27 RX ADMIN — Medication 650 MILLIGRAM(S): at 05:26

## 2025-04-27 RX ADMIN — HEPARIN SODIUM 5000 UNIT(S): 1000 INJECTION INTRAVENOUS; SUBCUTANEOUS at 17:28

## 2025-04-27 RX ADMIN — Medication 650 MILLIGRAM(S): at 11:24

## 2025-04-27 RX ADMIN — Medication 650 MILLIGRAM(S): at 21:16

## 2025-04-27 RX ADMIN — ROSUVASTATIN CALCIUM 5 MILLIGRAM(S): 20 TABLET, FILM COATED ORAL at 21:16

## 2025-04-27 RX ADMIN — HEPARIN SODIUM 5000 UNIT(S): 1000 INJECTION INTRAVENOUS; SUBCUTANEOUS at 05:26

## 2025-04-27 NOTE — PROGRESS NOTE ADULT - ASSESSMENT
75-year-old female past medical history of hyper tension hyperlipidemia brought in by  at bedside who is providing history for concern for UTI.  Patient's been having a slowly decreased decline in her mental status 4 days ago had a home test where she was found to have a UTI and it was positive. Her PCP prescribed nitrofurantoin 210 which patient is already took 4 doses however today noticed low grade fever which is what prompted the visit.  No chest pain, shortness of breath, nausea, vomiting, no flank pain. Upon further history, pt with gradual decline in mental status for months. Did not see neurologist yet. Very poor historian      #Acute change in mental status: paraneoplastic syndrome? vs metabolic encephalopathy , less likely dementia   discussed with , acute change in mental status over the last 3-4 weeks  Appreciate neurology input: Most likely presentation consistent with toxic or metabolic encephalopathy, but given the finding of the new masses paraneoplastic encephalitis on the differential  Patient had routine EEG with generalized slowing  MRI brain with and without contrast no acute pathology as above  Today patient mental status is better: Able to have full conversation and she is showing good insight about what is going on from the medical standpoint  -f/u TSH, B12, RPR, ammonia  follow up with neuro for the need of LP    # Pelvic and omental masses - suspected metastatic left ovarian cancer   # Moderate left hydronephrosis  # JAMIL on CKD3  CT A/p w/out contrast: lobulated liver contour is consistent with cirrhotic changes. Perihepatic 6.2 x 5.0 cm irregular hypodensity/collection. Moderate left hydronephrosis extending into pelvis, likely obstructed by pelvic mass. Anterior 3.6 cm omental mass, possibly connected to adjacent lobulated pelvic mass.  - s/p Azetreonam x3 , Urine culture is negative, DC antibiotic   -  TVUS as above  Appreciate GYN oncology, urology and interventional radiology input.    Tumor markers were sent Ca 125 is very high   4/26: House Staff discussed with interventional radiology, will plan to get CT abdomen pelvis with IV contrast , seems like the pelvic mass could be compressing the left ureter resulting in the moderate hydroureteronephrosis, Given that the patient presented with JAMIL that has been slowly improving, and received a contrast for MRI head yesterday, Will postpone getting CT abdomen pelvis with IV contrast till tomorrow April 27, also will plan to give IV fluid before and after the study   4/27: get CT A/P with po and IV contrast, give IVF before and after, discussed with  the suspicion for metastatic ovarian malignancy     #Anemia  - MCV WNL  - Likely CKD related  - Iron, folate, b12, ferritin  - T+S    #HTN/HLD  - monitor; meds as appropriate    DVT: SQ hep  Diet: Renal  Act: Assist  Dispo: Med    discussed with patient and      pending: CT A/P, GYNONC and IR follow up for next step, Uro follow up for hydronephrosis (may need lft PCN), Neuro follow up (possible LP?)

## 2025-04-27 NOTE — PROGRESS NOTE ADULT - ASSESSMENT
JAMIL - had been improving but no change today c. to yesterday  pelvic masses ?uterine or ovarian, with left hydronephrosis  s/p UTI with sepsis      Plan:  CT scan with IV and po contrast abd/pelvis today  please give IVF 6 hours before and 6 hours after 75 cc/hour to minimize risk of JAMIL post contrast\  patietn is still high risk for contrast given recent JAMIL  monitor renal function closely after contrast study.

## 2025-04-28 LAB
% ALBUMIN: 42.9 % — SIGNIFICANT CHANGE UP
% ALPHA 1: 9.2 % — SIGNIFICANT CHANGE UP
% ALPHA 2: 16.8 % — SIGNIFICANT CHANGE UP
% BETA: 13 % — SIGNIFICANT CHANGE UP
% GAMMA: 18.1 % — SIGNIFICANT CHANGE UP
% M SPIKE: SIGNIFICANT CHANGE UP
ALBUMIN SERPL ELPH-MCNC: 2.7 G/DL — LOW (ref 3.5–5.2)
ALBUMIN SERPL ELPH-MCNC: 2.7 G/DL — LOW (ref 3.6–5.5)
ALBUMIN/GLOB SERPL ELPH: 0.8 RATIO — SIGNIFICANT CHANGE UP
ALP SERPL-CCNC: 93 U/L — SIGNIFICANT CHANGE UP (ref 30–115)
ALPHA1 GLOB SERPL ELPH-MCNC: 0.6 G/DL — HIGH (ref 0.1–0.4)
ALPHA2 GLOB SERPL ELPH-MCNC: 1 G/DL — SIGNIFICANT CHANGE UP (ref 0.5–1)
ALT FLD-CCNC: 65 U/L — HIGH (ref 0–41)
ANION GAP SERPL CALC-SCNC: 12 MMOL/L — SIGNIFICANT CHANGE UP (ref 7–14)
AST SERPL-CCNC: 83 U/L — HIGH (ref 0–41)
AUTO DIFF PNL BLD: ABNORMAL
B-GLOBULIN SERPL ELPH-MCNC: 0.8 G/DL — SIGNIFICANT CHANGE UP (ref 0.5–1)
BASOPHILS # BLD AUTO: 0.02 K/UL — SIGNIFICANT CHANGE UP (ref 0–0.2)
BASOPHILS NFR BLD AUTO: 0.3 % — SIGNIFICANT CHANGE UP (ref 0–1)
BILIRUB SERPL-MCNC: 0.5 MG/DL — SIGNIFICANT CHANGE UP (ref 0.2–1.2)
BUN SERPL-MCNC: 40 MG/DL — HIGH (ref 10–20)
C-ANCA SER-ACNC: NEGATIVE — SIGNIFICANT CHANGE UP
C3 SERPL-MCNC: 149 MG/DL — SIGNIFICANT CHANGE UP (ref 81–157)
C4 SERPL-MCNC: 37 MG/DL — SIGNIFICANT CHANGE UP (ref 13–39)
CALCIUM SERPL-MCNC: 8.5 MG/DL — SIGNIFICANT CHANGE UP (ref 8.4–10.5)
CANCER AG125 SERPL-ACNC: 9456 U/ML — HIGH
CANCER AG19-9 SERPL-ACNC: 36 U/ML — HIGH
CEA SERPL-MCNC: 2.2 NG/ML — SIGNIFICANT CHANGE UP (ref 0–3.8)
CHLORIDE SERPL-SCNC: 104 MMOL/L — SIGNIFICANT CHANGE UP (ref 98–110)
CO2 SERPL-SCNC: 21 MMOL/L — SIGNIFICANT CHANGE UP (ref 17–32)
CREAT SERPL-MCNC: 1.6 MG/DL — HIGH (ref 0.7–1.5)
EGFR: 33 ML/MIN/1.73M2 — LOW
EGFR: 33 ML/MIN/1.73M2 — LOW
EOSINOPHIL # BLD AUTO: 0.08 K/UL — SIGNIFICANT CHANGE UP (ref 0–0.7)
EOSINOPHIL NFR BLD AUTO: 1.2 % — SIGNIFICANT CHANGE UP (ref 0–8)
GAMMA GLOBULIN: 1.1 G/DL — SIGNIFICANT CHANGE UP (ref 0.6–1.6)
GLUCOSE SERPL-MCNC: 83 MG/DL — SIGNIFICANT CHANGE UP (ref 70–99)
HCG-TM SERPL-ACNC: 12 MIU/ML — HIGH
HCT VFR BLD CALC: 24.4 % — LOW (ref 37–47)
HGB BLD-MCNC: 7.7 G/DL — LOW (ref 12–16)
IMM GRANULOCYTES NFR BLD AUTO: 1.5 % — HIGH (ref 0.1–0.3)
INTERPRETATION SERPL IFE-IMP: SIGNIFICANT CHANGE UP
KAPPA LC SER QL IFE: 11.06 MG/DL — HIGH (ref 0.33–1.94)
KAPPA/LAMBDA FREE LIGHT CHAIN RATIO, SERUM: 1.66 RATIO — HIGH (ref 0.26–1.65)
LAMBDA LC SER QL IFE: 6.65 MG/DL — HIGH (ref 0.57–2.63)
LYMPHOCYTES # BLD AUTO: 0.53 K/UL — LOW (ref 1.2–3.4)
LYMPHOCYTES # BLD AUTO: 8.1 % — LOW (ref 20.5–51.1)
M-SPIKE: SIGNIFICANT CHANGE UP (ref 0–0)
MAGNESIUM SERPL-MCNC: 1.7 MG/DL — LOW (ref 1.8–2.4)
MCHC RBC-ENTMCNC: 28.9 PG — SIGNIFICANT CHANGE UP (ref 27–31)
MCHC RBC-ENTMCNC: 31.6 G/DL — LOW (ref 32–37)
MCV RBC AUTO: 91.7 FL — SIGNIFICANT CHANGE UP (ref 81–99)
MONOCYTES # BLD AUTO: 0.88 K/UL — HIGH (ref 0.1–0.6)
MONOCYTES NFR BLD AUTO: 13.4 % — HIGH (ref 1.7–9.3)
MPO AB + PR3 PNL SER: SIGNIFICANT CHANGE UP
NEUTROPHILS # BLD AUTO: 4.95 K/UL — SIGNIFICANT CHANGE UP (ref 1.4–6.5)
NEUTROPHILS NFR BLD AUTO: 75.5 % — HIGH (ref 42.2–75.2)
NRBC BLD AUTO-RTO: 0 /100 WBCS — SIGNIFICANT CHANGE UP (ref 0–0)
P-ANCA SER-ACNC: NEGATIVE — SIGNIFICANT CHANGE UP
PLATELET # BLD AUTO: 215 K/UL — SIGNIFICANT CHANGE UP (ref 130–400)
PMV BLD: 11.9 FL — HIGH (ref 7.4–10.4)
POTASSIUM SERPL-MCNC: 4.7 MMOL/L — SIGNIFICANT CHANGE UP (ref 3.5–5)
POTASSIUM SERPL-SCNC: 4.7 MMOL/L — SIGNIFICANT CHANGE UP (ref 3.5–5)
PROT PATTERN SERPL ELPH-IMP: SIGNIFICANT CHANGE UP
PROT SERPL-MCNC: 5.7 G/DL — LOW (ref 6–8)
PROT SERPL-MCNC: 6.2 G/DL — SIGNIFICANT CHANGE UP (ref 6–8.3)
RBC # BLD: 2.66 M/UL — LOW (ref 4.2–5.4)
RBC # FLD: 13.4 % — SIGNIFICANT CHANGE UP (ref 11.5–14.5)
SODIUM SERPL-SCNC: 137 MMOL/L — SIGNIFICANT CHANGE UP (ref 135–146)
WBC # BLD: 6.56 K/UL — SIGNIFICANT CHANGE UP (ref 4.8–10.8)
WBC # FLD AUTO: 6.56 K/UL — SIGNIFICANT CHANGE UP (ref 4.8–10.8)

## 2025-04-28 PROCEDURE — 99497 ADVNCD CARE PLAN 30 MIN: CPT

## 2025-04-28 PROCEDURE — 99232 SBSQ HOSP IP/OBS MODERATE 35: CPT

## 2025-04-28 PROCEDURE — 72158 MRI LUMBAR SPINE W/O & W/DYE: CPT | Mod: 26

## 2025-04-28 PROCEDURE — 99233 SBSQ HOSP IP/OBS HIGH 50: CPT

## 2025-04-28 RX ORDER — MAGNESIUM SULFATE 500 MG/ML
2 SYRINGE (ML) INJECTION ONCE
Refills: 0 | Status: COMPLETED | OUTPATIENT
Start: 2025-04-28 | End: 2025-04-28

## 2025-04-28 RX ORDER — MIDAZOLAM IN 0.9 % SOD.CHLORID 1 MG/ML
2 PLASTIC BAG, INJECTION (ML) INTRAVENOUS ONCE
Refills: 0 | Status: DISCONTINUED | OUTPATIENT
Start: 2025-04-28 | End: 2025-05-02

## 2025-04-28 RX ORDER — HYDROXYZINE HYDROCHLORIDE 25 MG/1
25 TABLET, FILM COATED ORAL ONCE
Refills: 0 | Status: COMPLETED | OUTPATIENT
Start: 2025-04-28 | End: 2025-04-28

## 2025-04-28 RX ORDER — HEPARIN SODIUM 1000 [USP'U]/ML
5000 INJECTION INTRAVENOUS; SUBCUTANEOUS ONCE
Refills: 0 | Status: COMPLETED | OUTPATIENT
Start: 2025-04-28 | End: 2025-04-28

## 2025-04-28 RX ADMIN — Medication 650 MILLIGRAM(S): at 21:29

## 2025-04-28 RX ADMIN — ROSUVASTATIN CALCIUM 5 MILLIGRAM(S): 20 TABLET, FILM COATED ORAL at 21:29

## 2025-04-28 RX ADMIN — HEPARIN SODIUM 5000 UNIT(S): 1000 INJECTION INTRAVENOUS; SUBCUTANEOUS at 05:01

## 2025-04-28 RX ADMIN — Medication 650 MILLIGRAM(S): at 05:01

## 2025-04-28 RX ADMIN — Medication 1 APPLICATION(S): at 05:03

## 2025-04-28 RX ADMIN — Medication 25 GRAM(S): at 12:29

## 2025-04-28 RX ADMIN — HYDROXYZINE HYDROCHLORIDE 25 MILLIGRAM(S): 25 TABLET, FILM COATED ORAL at 20:17

## 2025-04-28 RX ADMIN — Medication 650 MILLIGRAM(S): at 13:37

## 2025-04-28 RX ADMIN — HEPARIN SODIUM 5000 UNIT(S): 1000 INJECTION INTRAVENOUS; SUBCUTANEOUS at 18:12

## 2025-04-28 NOTE — PROGRESS NOTE ADULT - ASSESSMENT
75-year-old female past medical history of hyper tension hyperlipidemia brought in by  at bedside who is providing history for concern for UTI.  Patient's been having a slowly decreased decline in her mental status 4 days ago had a home test where she was found to have a UTI and it was positive. Her PCP prescribed nitrofurantoin 210 which patient is already took 4 doses however today noticed low grade fever which is what prompted the visit.  No chest pain, shortness of breath, nausea, vomiting, no flank pain. Upon further history, pt with gradual decline in mental status for months. Did not see neurologist yet. Very poor historian      #Acute change in mental status: paraneoplastic syndrome? vs metabolic encephalopathy , less likely dementia   -per  , acute change in mental status over the last 3-4 weeks  -Appreciate neurology input: Most likely presentation consistent with toxic or metabolic encephalopathy, but given the finding of the new masses paraneoplastic encephalitis on the differential  -Patient had routine EEG with generalized slowing  -MRI brain with and without contrast no acute pathology as above  - if mental status worsens obtain LP per neuro 4/28    # Pelvic and omental masses - suspected metastatic left ovarian cancer   # Moderate left hydronephrosis  # JAMIL on CKD3  CT A/p w/out contrast: lobulated liver contour is consistent with cirrhotic changes. Perihepatic 6.2 x 5.0 cm irregular hypodensity/collection. Moderate left hydronephrosis extending into pelvis, likely obstructed by pelvic mass. Anterior 3.6 cm omental mass, possibly connected to adjacent lobulated pelvic mass.  - s/p Azetreonam x3 , Urine culture is negative, DC antibiotic   -  TVUS as above  Appreciate GYN oncology, urology and interventional radiology input.    Tumor markers were sent Ca 125 is very high   4/26: House Staff discussed with interventional radiology, will plan to get CT abdomen pelvis with IV contrast , seems like the pelvic mass could be compressing the left ureter resulting in the moderate hydroureteronephrosis, Given that the patient presented with JAMIL that has been slowly improving, and received a contrast for MRI head yesterday, Will postpone getting CT abdomen pelvis with IV contrast till tomorrow April 27, also will plan to give IV fluid before and after the study   4/28 - CTAPw/PO and IV con:  -- Dominant left adnexal mass measuring up to 10 cm  --Lobulated heterogeneous uterus/uterine remnant may reflect another primary neoplasm.  --Focal circumferential wall thickening of the sigmoid colon measuring approximately 4.4 cm in length. It is not clear whether this reflects a metastatic lesion or another primary.  --Hepatic and splenic masses consistent with metastatic disease.  --Possible L4-L5 discitis/osteomyelitis. Further evaluation with MRI may be of benefit.  - IR to biopsy 4/29  - MRI lumbar w w/o con to further eval possible L4-L5 discitis/osteomyelitis seen on CT     #Anemia  - MCV WNL  - Likely CKD related  - Iron, folate, b12, ferritin  - T+S    #HTN/HLD  - monitor; meds as appropriate

## 2025-04-28 NOTE — PROGRESS NOTE ADULT - ASSESSMENT
75-year-old female past medical history of hyper tension hyperlipidemia brought in by  at bedside who is providing history for concern for UTI.  Patient's been having a slowly decreased decline in her mental status 4 days ago had a home test where she was found to have a UTI and it was positive. Her PCP prescribed nitrofurantoin 210 which patient is already took 4 doses however today noticed low grade fever which is what prompted the visit.  No chest pain, shortness of breath, nausea, vomiting, no flank pain. Upon further history, pt with gradual decline in mental status for months. Did not see neurologist yet. Very poor historian    #JAMIL on CKD3  #Dementia vs paraneoplastic syndrome  #Rt hydro  #Pelvic and omental masses  CT A/p w/out contrast: lobulated liver contour is consistent with cirrhotic changes. Perihepatic 6.2 x 5.0 cm irregular hypodensity/collection. Moderate left hydronephrosis extending into pelvis, likely obstructed by pelvic mass. Anterior 3.6 cm omental mass, possibly connected to adjacent lobulated pelvic mass.  - s/p Azetreonam x3   - cont IVF   -CTH NG  -NG TSH, B12, RPR, ammonia  < from: CT Abdomen and Pelvis w/ Oral Cont and w/ IV Cont (04.27.25 @ 14:47) >  Dominant left adnexal mass measuring up to 10 cm    Lobulated heterogeneous uterus/uterine remnant may reflect another   primary neoplasm.    Focal circumferential wall thickening of thesigmoid colon measuring   approximately 4.4 cm in length. It is not clear whether this reflects a   metastatic lesion or another primary.    Hepatic and splenic masses consistent with metastatic disease.    Possible L4-L5 discitis/osteomyelitis. Further evaluation with MRI may be   of benefit.    < end of copied text >  < from: US Pelvis Complete (04.26.25 @ 10:18) >  Complex pelvic masses with internal vascularity at least one of which   appears related to the left ovary and the other one may be right ovarian   or uterine in origin difficult to definitively delineate on sonogram.    < end of copied text >  < from: MR Head w/wo IV Cont (04.25.25 @ 22:17) >    No acute intracranial pathology or abnormal enhancement.  < end of copied text >  -plan for Bx on 4/29  -check MRI L spine w/ and w/out contrast  -Lt nephrostomy if Cr worsens    #Anemia  - MCV WNL  - Likely CKD, malignancy related  - Iron, folate, b12, ferritin  - T+S    #HTN/HLD  - monitor; meds as appropriate    DVT: SQ hep  Diet: Renal  Act: Assist  Dispo: Med    GOC: d/w  who agrees w/ DNR/DNI    #Progress Note Handoff  Pending: Clinical improvement and stability__x__Bx, MRI LS spine  Pt/Family discussion: Pt/family informed and agree with the current plan  Disposition: Home_    My note supersedes the residents note should a discrepancy arise.    Chart and notes personally reviewed.  Care Discussed with Consultants/Other Providers/ Housestaff [ x] YES [ ] NO   Radiology, labs, old records personally reviewed.    discussed w/ housestaff, nursing, case management, GYN, IR    Attestation Statements:    Attestation Statements:  Risk Statement (NON-critical care).     On this date of service, level of risk to patient is considered: High.     Due to: pt with illness causing risk to life or bodily fxn    Time-based billing (NON-critical care).     50 minutes spent on total encounter. The necessity of the time spent during the encounter on this date of service was due to:     time spent on review of labs, imaging studies, old records, obtaining history, personally examining patient, counselling and communicating with patient/ family, entering orders for medications/tests/etc, discussions with other health care providers, documentation in electronic health records, independent interpretation of labs, imaging/procedure results and care coordination.

## 2025-04-28 NOTE — PROGRESS NOTE ADULT - ASSESSMENT
acute kidney injury   - improved     CKD stage 3 (baseline Cr 1 and eGFR 58ml/min in 2024) with mild proteinuria  left hydronephrosis due to pelvic mass  left adnexal mass / heterogeneous uterus / splenic and hepatic masses / focal colon thickening   metabolic acidosis  AMS, subacute  wt loss / poor PO intake  new normocytic anemia (Hgb 11's 2024)  HTN    plan:    s/p iv contrast 4/27, can DC IVF after today  cont off olmesartan  cont po bicarb   recommending IR for left PCN, but renal function improved   IR for mass biopsy   f/u  / gyn-onc / IR  d/w nursing

## 2025-04-29 ENCOUNTER — RESULT REVIEW (OUTPATIENT)
Age: 75
End: 2025-04-29

## 2025-04-29 LAB
ALBUMIN SERPL ELPH-MCNC: 2.6 G/DL — LOW (ref 3.5–5.2)
ALP SERPL-CCNC: 96 U/L — SIGNIFICANT CHANGE UP (ref 30–115)
ALT FLD-CCNC: 51 U/L — HIGH (ref 0–41)
ANA PAT FLD IF-IMP: ABNORMAL
ANA TITR SER: ABNORMAL
APTT BLD: 25.1 SEC — LOW (ref 27–39.2)
AST SERPL-CCNC: 71 U/L — HIGH (ref 0–41)
BASOPHILS # BLD AUTO: 0.01 K/UL — SIGNIFICANT CHANGE UP (ref 0–0.2)
BASOPHILS NFR BLD AUTO: 0.2 % — SIGNIFICANT CHANGE UP (ref 0–1)
BILIRUB SERPL-MCNC: 0.3 MG/DL — SIGNIFICANT CHANGE UP (ref 0.2–1.2)
BUN SERPL-MCNC: 42 MG/DL — HIGH (ref 10–20)
CALCIUM SERPL-MCNC: 8.4 MG/DL — SIGNIFICANT CHANGE UP (ref 8.4–10.5)
CEA SERPL-MCNC: 2.1 NG/ML — SIGNIFICANT CHANGE UP (ref 0–3.8)
CHLORIDE SERPL-SCNC: 105 MMOL/L — SIGNIFICANT CHANGE UP (ref 98–110)
CO2 SERPL-SCNC: 36 MMOL/L — HIGH (ref 17–32)
CREAT SERPL-MCNC: 1.8 MG/DL — HIGH (ref 0.7–1.5)
CULTURE RESULTS: SIGNIFICANT CHANGE UP
CULTURE RESULTS: SIGNIFICANT CHANGE UP
EGFR: 29 ML/MIN/1.73M2 — LOW
EGFR: 29 ML/MIN/1.73M2 — LOW
EOSINOPHIL # BLD AUTO: 0.07 K/UL — SIGNIFICANT CHANGE UP (ref 0–0.7)
EOSINOPHIL NFR BLD AUTO: 1.2 % — SIGNIFICANT CHANGE UP (ref 0–8)
GLUCOSE SERPL-MCNC: 91 MG/DL — SIGNIFICANT CHANGE UP (ref 70–99)
HCT VFR BLD CALC: 23 % — LOW (ref 37–47)
HGB BLD-MCNC: 7.4 G/DL — LOW (ref 12–16)
IMM GRANULOCYTES NFR BLD AUTO: 1.5 % — HIGH (ref 0.1–0.3)
INHIBIN B SERUM: <7 PG/ML — SIGNIFICANT CHANGE UP (ref 0–16.9)
INR BLD: 1.04 RATIO — SIGNIFICANT CHANGE UP (ref 0.65–1.3)
LYMPHOCYTES # BLD AUTO: 0.55 K/UL — LOW (ref 1.2–3.4)
LYMPHOCYTES # BLD AUTO: 9.2 % — LOW (ref 20.5–51.1)
MAGNESIUM SERPL-MCNC: 2.7 MG/DL — HIGH (ref 1.8–2.4)
MCHC RBC-ENTMCNC: 28.8 PG — SIGNIFICANT CHANGE UP (ref 27–31)
MCHC RBC-ENTMCNC: 32.2 G/DL — SIGNIFICANT CHANGE UP (ref 32–37)
MCV RBC AUTO: 89.5 FL — SIGNIFICANT CHANGE UP (ref 81–99)
MONOCYTES # BLD AUTO: 0.75 K/UL — HIGH (ref 0.1–0.6)
MONOCYTES NFR BLD AUTO: 12.6 % — HIGH (ref 1.7–9.3)
NEUTROPHILS # BLD AUTO: 4.5 K/UL — SIGNIFICANT CHANGE UP (ref 1.4–6.5)
NEUTROPHILS NFR BLD AUTO: 75.3 % — HIGH (ref 42.2–75.2)
NRBC BLD AUTO-RTO: 0 /100 WBCS — SIGNIFICANT CHANGE UP (ref 0–0)
PLATELET # BLD AUTO: 225 K/UL — SIGNIFICANT CHANGE UP (ref 130–400)
PMV BLD: 11.8 FL — HIGH (ref 7.4–10.4)
POTASSIUM SERPL-MCNC: 4.6 MMOL/L — SIGNIFICANT CHANGE UP (ref 3.5–5)
POTASSIUM SERPL-SCNC: 4.6 MMOL/L — SIGNIFICANT CHANGE UP (ref 3.5–5)
PROT SERPL-MCNC: 5.5 G/DL — LOW (ref 6–8)
PROTHROM AB SERPL-ACNC: 12.3 SEC — SIGNIFICANT CHANGE UP (ref 9.95–12.87)
RBC # BLD: 2.57 M/UL — LOW (ref 4.2–5.4)
RBC # FLD: 13.5 % — SIGNIFICANT CHANGE UP (ref 11.5–14.5)
SODIUM SERPL-SCNC: 139 MMOL/L — SIGNIFICANT CHANGE UP (ref 135–146)
SPECIMEN SOURCE: SIGNIFICANT CHANGE UP
SPECIMEN SOURCE: SIGNIFICANT CHANGE UP
WBC # BLD: 5.97 K/UL — SIGNIFICANT CHANGE UP (ref 4.8–10.8)
WBC # FLD AUTO: 5.97 K/UL — SIGNIFICANT CHANGE UP (ref 4.8–10.8)

## 2025-04-29 PROCEDURE — 88341 IMHCHEM/IMCYTCHM EA ADD ANTB: CPT | Mod: 26

## 2025-04-29 PROCEDURE — 88342 IMHCHEM/IMCYTCHM 1ST ANTB: CPT | Mod: 26

## 2025-04-29 PROCEDURE — 99233 SBSQ HOSP IP/OBS HIGH 50: CPT

## 2025-04-29 PROCEDURE — 49180 BIOPSY ABDOMINAL MASS: CPT

## 2025-04-29 PROCEDURE — 88305 TISSUE EXAM BY PATHOLOGIST: CPT | Mod: 26

## 2025-04-29 PROCEDURE — 99152 MOD SED SAME PHYS/QHP 5/>YRS: CPT

## 2025-04-29 PROCEDURE — 88333 PATH CONSLTJ SURG CYTO XM 1: CPT | Mod: 26

## 2025-04-29 PROCEDURE — 77012 CT SCAN FOR NEEDLE BIOPSY: CPT | Mod: 26

## 2025-04-29 RX ORDER — VANCOMYCIN HCL IN 5 % DEXTROSE 1.5G/250ML
750 PLASTIC BAG, INJECTION (ML) INTRAVENOUS ONCE
Refills: 0 | Status: COMPLETED | OUTPATIENT
Start: 2025-04-29 | End: 2025-04-29

## 2025-04-29 RX ORDER — AZTREONAM 2 G/1
1000 INJECTION, POWDER, LYOPHILIZED, FOR SOLUTION INTRAMUSCULAR; INTRAVENOUS EVERY 12 HOURS
Refills: 0 | Status: DISCONTINUED | OUTPATIENT
Start: 2025-04-29 | End: 2025-04-30

## 2025-04-29 RX ADMIN — Medication 1 APPLICATION(S): at 05:07

## 2025-04-29 RX ADMIN — AZTREONAM 50 MILLIGRAM(S): 2 INJECTION, POWDER, LYOPHILIZED, FOR SOLUTION INTRAMUSCULAR; INTRAVENOUS at 21:30

## 2025-04-29 RX ADMIN — ROSUVASTATIN CALCIUM 5 MILLIGRAM(S): 20 TABLET, FILM COATED ORAL at 21:52

## 2025-04-29 RX ADMIN — Medication 650 MILLIGRAM(S): at 20:50

## 2025-04-29 RX ADMIN — Medication 650 MILLIGRAM(S): at 05:06

## 2025-04-29 RX ADMIN — Medication 650 MILLIGRAM(S): at 19:32

## 2025-04-29 RX ADMIN — Medication 250 MILLIGRAM(S): at 20:22

## 2025-04-29 NOTE — PROGRESS NOTE ADULT - ASSESSMENT
acute kidney injury  CKD stage 3   left hydronephrosis due to pelvic mass  left adnexal mass / heterogeneous uterus / splenic and hepatic masses / focal colon thickening   metabolic acidosis  AMS, subacute  wt loss / poor PO intake  new normocytic anemia (Hgb 11's 2024)  HTN    plan:    off IVF  encourage po hydration   dc po bicarb   cont off olmesartan   recommending IR for left PCN, but renal function improved and  defers   IR for mass biopsy today  f/u  / gyn-onc / IR  d/w  in IR suite

## 2025-04-29 NOTE — PROGRESS NOTE ADULT - ASSESSMENT
75-year-old female past medical history of hyper tension hyperlipidemia brought in by  at bedside who is providing history for concern for UTI.  Patient's been having a slowly decreased decline in her mental status 4 days ago had a home test where she was found to have a UTI and it was positive. Her PCP prescribed nitrofurantoin 210 which patient is already took 4 doses however today noticed low grade fever which is what prompted the visit.  No chest pain, shortness of breath, nausea, vomiting, no flank pain. Upon further history, pt with gradual decline in mental status for months. Did not see neurologist yet. Very poor historian    #JAMIL on CKD3  #Dementia vs paraneoplastic syndrome  #Rt hydro  #Pelvic and omental masses  CT A/p w/out contrast: lobulated liver contour is consistent with cirrhotic changes. Perihepatic 6.2 x 5.0 cm irregular hypodensity/collection. Moderate left hydronephrosis extending into pelvis, likely obstructed by pelvic mass. Anterior 3.6 cm omental mass, possibly connected to adjacent lobulated pelvic mass.  - s/p Azetreonam x3   - cont IVF   -CTH NG  -NG TSH, B12, RPR, ammonia  < from: CT Abdomen and Pelvis w/ Oral Cont and w/ IV Cont (04.27.25 @ 14:47) >  Dominant left adnexal mass measuring up to 10 cm    Lobulated heterogeneous uterus/uterine remnant may reflect another   primary neoplasm.    Focal circumferential wall thickening of thesigmoid colon measuring   approximately 4.4 cm in length. It is not clear whether this reflects a   metastatic lesion or another primary.    Hepatic and splenic masses consistent with metastatic disease.    Possible L4-L5 discitis/osteomyelitis. Further evaluation with MRI may be   of benefit.    < end of copied text >  < from: US Pelvis Complete (04.26.25 @ 10:18) >  Complex pelvic masses with internal vascularity at least one of which   appears related to the left ovary and the other one may be right ovarian   or uterine in origin difficult to definitively delineate on sonogram.    < end of copied text >  < from: MR Head w/wo IV Cont (04.25.25 @ 22:17) >    No acute intracranial pathology or abnormal enhancement.  < end of copied text >  -s/p Bx on 4/29  -MRI L spine w/ and w/out contrast: multiple spine mets  -Lt nephrostomy if Cr worsens    #Anemia  - MCV WNL  - Likely CKD, malignancy related  - Iron, folate, b12, ferritin  - T+S    #HTN/HLD  - monitor; meds as appropriate    DVT: SQ hep  Diet: Renal  Act: Assist  Dispo: Med    GOC: d/w  who agrees w/ DNR/DNI    #Progress Note Handoff  Pending: Clinical improvement and stability__x_  Pt/Family discussion: Pt/family informed and agree with the current plan  Disposition: Home_    My note supersedes the residents note should a discrepancy arise.    Chart and notes personally reviewed.  Care Discussed with Consultants/Other Providers/ Housestaff [ x] YES [ ] NO   Radiology, labs, old records personally reviewed.    discussed w/ housestaff, nursing, case management, GYN, IR    Attestation Statements:    Attestation Statements:  Risk Statement (NON-critical care).     On this date of service, level of risk to patient is considered: High.     Due to: pt with illness causing risk to life or bodily fxn    Time-based billing (NON-critical care).     50 minutes spent on total encounter. The necessity of the time spent during the encounter on this date of service was due to:     time spent on review of labs, imaging studies, old records, obtaining history, personally examining patient, counselling and communicating with patient/ family, entering orders for medications/tests/etc, discussions with other health care providers, documentation in electronic health records, independent interpretation of labs, imaging/procedure results and care coordination.

## 2025-04-29 NOTE — PROGRESS NOTE ADULT - ASSESSMENT
75-year-old female past medical history of hyper tension hyperlipidemia brought in by  at bedside who is providing history for concern for UTI.  Patient's been having a slowly decreased decline in her mental status 4 days ago had a home test where she was found to have a UTI and it was positive. Her PCP prescribed nitrofurantoin 210 which patient is already took 4 doses however today noticed low grade fever which is what prompted the visit.  No chest pain, shortness of breath, nausea, vomiting, no flank pain. Upon further history, pt with gradual decline in mental status for months. Did not see neurologist yet. Very poor historian      #Acute change in mental status: paraneoplastic syndrome? vs metabolic encephalopathy , less likely dementia   -per  , acute change in mental status over the last 3-4 weeks  -Appreciate neurology input: Most likely presentation consistent with toxic or metabolic encephalopathy, but given the finding of the new masses paraneoplastic encephalitis on the differential  -Patient had routine EEG with generalized slowing  -MRI brain with and without contrast no acute pathology as above  - if mental status worsens obtain LP per neuro 4/28    # Pelvic and omental masses - suspected metastatic left ovarian cancer   # Moderate left hydronephrosis  # JAMIL on CKD3  CT A/p w/out contrast: lobulated liver contour is consistent with cirrhotic changes. Perihepatic 6.2 x 5.0 cm irregular hypodensity/collection. Moderate left hydronephrosis extending into pelvis, likely obstructed by pelvic mass. Anterior 3.6 cm omental mass, possibly connected to adjacent lobulated pelvic mass.  - s/p Azetreonam x3 , Urine culture is negative, DC antibiotic   -  TVUS as above  Appreciate GYN oncology, urology and interventional radiology input.    Tumor markers were sent Ca 125 is very high   4/26: House Staff discussed with interventional radiology, will plan to get CT abdomen pelvis with IV contrast , seems like the pelvic mass could be compressing the left ureter resulting in the moderate hydroureteronephrosis, Given that the patient presented with JAMIL that has been slowly improving, and received a contrast for MRI head yesterday, Will postpone getting CT abdomen pelvis with IV contrast till tomorrow April 27, also will plan to give IV fluid before and after the study   4/28 - CTAPw/PO and IV con:  -- Dominant left adnexal mass measuring up to 10 cm  --Lobulated heterogeneous uterus/uterine remnant may reflect another primary neoplasm.  --Focal circumferential wall thickening of the sigmoid colon measuring approximately 4.4 cm in length. It is not clear whether this reflects a metastatic lesion or another primary.  --Hepatic and splenic masses consistent with metastatic disease.  --Possible L4-L5 discitis/osteomyelitis. Further evaluation with MRI may be of benefit.  - IR to biopsy 4/29  - MRI lumbar w w/o con -  Findings consistent with widespread osseous metastatic disease,  No associated spinal stenosis.    #Anemia  - MCV WNL  - Likely CKD related  - Iron, folate, b12, ferritin  - T+S    #HTN/HLD  - monitor; meds as appropriate

## 2025-04-29 NOTE — PRE PROCEDURE NOTE - PRE PROCEDURE EVALUATION
Vascular & Interventional Radiology Pre-Procedure Note    Procedure Name: Image guided anterior omental mass biopsy with sedation/anesthesia    HPI: HPI:  75-year-old female past medical history of hyper tension hyperlipidemia presents to Ir today for Image guided anterior omental mass biopsy with sedation/anesthesia    · BP Systolic	 173 mm Hg  · BP Diastolic	79 mm Hg  · Heart Rate	88 /min  · Respiration Rate (breaths/min)	18 /min  · Temp (F)		99.3 Degrees F  · SpO2 (%)	97 % room air    WBC 8  Hgb 8.1  Albumin 3.3  BUN 71  Cr 1.9  eGFR 27  UA - no bacteria, but protein + WBC +, was already on Abx   (24 Apr 2025 08:56)      Allergies: penicillin (Unknown)  latex (Rash)    Medications (Abx/Cardiac/Anticoagulation/Blood Products)    heparin   Injectable: 5000 Unit(s) SubCutaneous (04-28 @ 18:12)  heparin   Injectable: 5000 Unit(s) SubCutaneous (04-28 @ 05:01)    Data:    T(C): 36.7  HR: 69  BP: 138/66  RR: 18  SpO2: 97%    Exam  General: NAD, AAO x3, no distress  Chest: breathing comfortably on room air, CTAB  Abdomen: soft, non-tender, non- distended   Extremities: positive pulses bilaterally x4    Radiology & Additional Studies: Radiology imaging reviewed.     Labs:   -WBC 5.97 / HgB 7.4 / Hct 23.0 / Plt 225  -Na 137 / Cl 104 / BUN 40 / Glucose 83  -K 4.7 / CO2 21 / Cr 1.6  -ALT 65 / Alk Phos 93 / T.Bili 0.5      Consentable: [x ] Yes   [ ] No     Plan:   -75y Female presents for Image guided anterior omental mass biopsy with sedation/anesthesia  -Risks/Benefits/alternatives explained with the patient and/or healthcare proxy and witnessed informed consent obtained.

## 2025-04-30 LAB
ALBUMIN SERPL ELPH-MCNC: 2.9 G/DL — LOW (ref 3.5–5.2)
ALP SERPL-CCNC: 95 U/L — SIGNIFICANT CHANGE UP (ref 30–115)
ALT FLD-CCNC: 47 U/L — HIGH (ref 0–41)
ANION GAP SERPL CALC-SCNC: 12 MMOL/L — SIGNIFICANT CHANGE UP (ref 7–14)
APPEARANCE UR: ABNORMAL
AST SERPL-CCNC: 69 U/L — HIGH (ref 0–41)
BASOPHILS # BLD AUTO: 0.02 K/UL — SIGNIFICANT CHANGE UP (ref 0–0.2)
BASOPHILS NFR BLD AUTO: 0.3 % — SIGNIFICANT CHANGE UP (ref 0–1)
BILIRUB SERPL-MCNC: 0.5 MG/DL — SIGNIFICANT CHANGE UP (ref 0.2–1.2)
BILIRUB UR-MCNC: NEGATIVE — SIGNIFICANT CHANGE UP
BUN SERPL-MCNC: 43 MG/DL — HIGH (ref 10–20)
CALCIUM SERPL-MCNC: 8.1 MG/DL — LOW (ref 8.4–10.5)
CHLORIDE SERPL-SCNC: 106 MMOL/L — SIGNIFICANT CHANGE UP (ref 98–110)
CO2 SERPL-SCNC: 20 MMOL/L — SIGNIFICANT CHANGE UP (ref 17–32)
COLOR SPEC: YELLOW — SIGNIFICANT CHANGE UP
CREAT SERPL-MCNC: 1.8 MG/DL — HIGH (ref 0.7–1.5)
DIFF PNL FLD: ABNORMAL
EGFR: 29 ML/MIN/1.73M2 — LOW
EGFR: 29 ML/MIN/1.73M2 — LOW
EOSINOPHIL # BLD AUTO: 0.06 K/UL — SIGNIFICANT CHANGE UP (ref 0–0.7)
EOSINOPHIL NFR BLD AUTO: 0.8 % — SIGNIFICANT CHANGE UP (ref 0–8)
FLUAV AG NPH QL: SIGNIFICANT CHANGE UP
FLUBV AG NPH QL: SIGNIFICANT CHANGE UP
GLUCOSE SERPL-MCNC: 131 MG/DL — HIGH (ref 70–99)
GLUCOSE UR QL: NEGATIVE MG/DL — SIGNIFICANT CHANGE UP
HCT VFR BLD CALC: 23.3 % — LOW (ref 37–47)
HGB BLD-MCNC: 7.4 G/DL — LOW (ref 12–16)
IMM GRANULOCYTES NFR BLD AUTO: 1.2 % — HIGH (ref 0.1–0.3)
INHIBIN A SERPL-MCNC: 2.8 PG/ML — SIGNIFICANT CHANGE UP
INHIBIN A SERPL-MCNC: 3 PG/ML — SIGNIFICANT CHANGE UP
KETONES UR-MCNC: NEGATIVE MG/DL — SIGNIFICANT CHANGE UP
LACTATE SERPL-SCNC: 1.7 MMOL/L — SIGNIFICANT CHANGE UP (ref 0.7–2)
LEUKOCYTE ESTERASE UR-ACNC: ABNORMAL
LYMPHOCYTES # BLD AUTO: 0.43 K/UL — LOW (ref 1.2–3.4)
LYMPHOCYTES # BLD AUTO: 5.7 % — LOW (ref 20.5–51.1)
MAGNESIUM SERPL-MCNC: 2.1 MG/DL — SIGNIFICANT CHANGE UP (ref 1.8–2.4)
MCHC RBC-ENTMCNC: 28.7 PG — SIGNIFICANT CHANGE UP (ref 27–31)
MCHC RBC-ENTMCNC: 31.8 G/DL — LOW (ref 32–37)
MCV RBC AUTO: 90.3 FL — SIGNIFICANT CHANGE UP (ref 81–99)
MONOCYTES # BLD AUTO: 0.88 K/UL — HIGH (ref 0.1–0.6)
MONOCYTES NFR BLD AUTO: 11.6 % — HIGH (ref 1.7–9.3)
NEUTROPHILS # BLD AUTO: 6.13 K/UL — SIGNIFICANT CHANGE UP (ref 1.4–6.5)
NEUTROPHILS NFR BLD AUTO: 80.4 % — HIGH (ref 42.2–75.2)
NITRITE UR-MCNC: NEGATIVE — SIGNIFICANT CHANGE UP
NRBC BLD AUTO-RTO: 0 /100 WBCS — SIGNIFICANT CHANGE UP (ref 0–0)
PH UR: 5.5 — SIGNIFICANT CHANGE UP (ref 5–8)
PLATELET # BLD AUTO: 235 K/UL — SIGNIFICANT CHANGE UP (ref 130–400)
PMV BLD: 12.3 FL — HIGH (ref 7.4–10.4)
POTASSIUM SERPL-MCNC: 4.8 MMOL/L — SIGNIFICANT CHANGE UP (ref 3.5–5)
POTASSIUM SERPL-SCNC: 4.8 MMOL/L — SIGNIFICANT CHANGE UP (ref 3.5–5)
PROT SERPL-MCNC: 5.6 G/DL — LOW (ref 6–8)
PROT UR-MCNC: 100 MG/DL
RBC # BLD: 2.58 M/UL — LOW (ref 4.2–5.4)
RBC # FLD: 13.3 % — SIGNIFICANT CHANGE UP (ref 11.5–14.5)
RSV RNA NPH QL NAA+NON-PROBE: SIGNIFICANT CHANGE UP
SARS-COV-2 RNA SPEC QL NAA+PROBE: SIGNIFICANT CHANGE UP
SODIUM SERPL-SCNC: 138 MMOL/L — SIGNIFICANT CHANGE UP (ref 135–146)
SOURCE RESPIRATORY: SIGNIFICANT CHANGE UP
SP GR SPEC: 1.02 — SIGNIFICANT CHANGE UP (ref 1–1.03)
UROBILINOGEN FLD QL: 2 MG/DL (ref 0.2–1)
WBC # BLD: 7.61 K/UL — SIGNIFICANT CHANGE UP (ref 4.8–10.8)
WBC # FLD AUTO: 7.61 K/UL — SIGNIFICANT CHANGE UP (ref 4.8–10.8)

## 2025-04-30 PROCEDURE — 99233 SBSQ HOSP IP/OBS HIGH 50: CPT

## 2025-04-30 RX ORDER — HEPARIN SODIUM 1000 [USP'U]/ML
5000 INJECTION INTRAVENOUS; SUBCUTANEOUS EVERY 8 HOURS
Refills: 0 | Status: DISCONTINUED | OUTPATIENT
Start: 2025-04-30 | End: 2025-05-02

## 2025-04-30 RX ORDER — VANCOMYCIN HCL IN 5 % DEXTROSE 1.5G/250ML
1000 PLASTIC BAG, INJECTION (ML) INTRAVENOUS ONCE
Refills: 0 | Status: COMPLETED | OUTPATIENT
Start: 2025-04-30 | End: 2025-04-30

## 2025-04-30 RX ADMIN — HEPARIN SODIUM 5000 UNIT(S): 1000 INJECTION INTRAVENOUS; SUBCUTANEOUS at 21:43

## 2025-04-30 RX ADMIN — Medication 650 MILLIGRAM(S): at 20:23

## 2025-04-30 RX ADMIN — Medication 250 MILLIGRAM(S): at 06:22

## 2025-04-30 RX ADMIN — Medication 1 APPLICATION(S): at 05:18

## 2025-04-30 RX ADMIN — ROSUVASTATIN CALCIUM 5 MILLIGRAM(S): 20 TABLET, FILM COATED ORAL at 21:43

## 2025-04-30 RX ADMIN — HEPARIN SODIUM 5000 UNIT(S): 1000 INJECTION INTRAVENOUS; SUBCUTANEOUS at 14:36

## 2025-04-30 RX ADMIN — Medication 650 MILLIGRAM(S): at 05:22

## 2025-04-30 RX ADMIN — AZTREONAM 50 MILLIGRAM(S): 2 INJECTION, POWDER, LYOPHILIZED, FOR SOLUTION INTRAMUSCULAR; INTRAVENOUS at 05:17

## 2025-04-30 NOTE — PROGRESS NOTE ADULT - ASSESSMENT
acute kidney injury  CKD stage 3   left hydronephrosis due to pelvic mass  left adnexal mass / heterogeneous uterus / splenic and hepatic masses / focal colon thickening   probable metastatic ovarian Ca ( very elevated)  metabolic acidosis  AMS / dementia   wt loss / poor PO intake  anemia    HTN    plan:    cont off IVF  cont off oral bicarb  cont off olmesartan   encourage po hydration   spoke with  again today at bedside,  defers PCN unless renal /  status worsens   f/u path   f/u  / gyn-onc

## 2025-04-30 NOTE — CONSULT NOTE ADULT - SUBJECTIVE AND OBJECTIVE BOX
SOCORRO JOHN  75y, Female  Allergy: penicillin (Unknown)  latex (Rash)      All historical available data reviewed.    HPI:  75-year-old female past medical history of hyper tension hyperlipidemia brought in by  at bedside who is providing history for concern for UTI.  Patient's been having a slowly decreased decline in her mental status 4 days ago had a home test where she was found to have a UTI and it was positive. Her PCP prescribed nitrofurantoin 210 which patient is already took 4 doses however today noticed low grade fever which is what prompted the visit.  No chest pain, shortness of breath, nausea, vomiting, no flank pain.    · BP Systolic	 173 mm Hg  · BP Diastolic	79 mm Hg  · Heart Rate	88 /min  · Respiration Rate (breaths/min)	18 /min  · Temp (F)		99.3 Degrees F  · SpO2 (%)	97 % room air    WBC 8  Hgb 8.1  Albumin 3.3  BUN 71  Cr 1.9  eGFR 27  UA - no bacteria, but protein + WBC +, was already on Abx   (24 Apr 2025 08:56)    INFECTIOUS DISEASE HISTORY.  ID consulted for diagnostic work up/ABx recommendations  Prior hospital charts reviewed.   Primary team notes reviewed.   Other consultant notes reviewed.   Prior cultures noted     FAMILY HISTORY:    PAST MEDICAL & SURGICAL HISTORY:  HTN (hypertension)      HLD (hyperlipidemia)      Cataract      S/P liudmlia      H/O lumpectomy  left      History of dilation and curettage      Status post cataract extraction and insertion of intraocular lens, left            VITALS:  T(F): 99.3, Max: 102 (04-30-25 @ 04:30)  HR: 72  BP: 128/67  RR: 18Vital Signs Last 24 Hrs  T(C): 37.4 (30 Apr 2025 08:00), Max: 38.9 (30 Apr 2025 04:30)  T(F): 99.3 (30 Apr 2025 08:00), Max: 102 (30 Apr 2025 04:30)  HR: 72 (30 Apr 2025 08:00) (68 - 75)  BP: 128/67 (30 Apr 2025 08:00) (116/65 - 155/66)  BP(mean): 82 (30 Apr 2025 08:00) (80 - 102)  RR: 18 (30 Apr 2025 08:00) (18 - 19)  SpO2: 98% (30 Apr 2025 08:00) (94% - 100%)    Parameters below as of 30 Apr 2025 08:00  Patient On (Oxygen Delivery Method): room air        TESTS & MEASUREMENTS:                        7.4    7.61  )-----------( 235      ( 30 Apr 2025 06:32 )             23.3     04-30    138  |  106  |  43[H]  ----------------------------<  131[H]  4.8   |  20  |  1.8[H]    Ca    8.1[L]      30 Apr 2025 06:32  Mg     2.1     04-30    TPro  5.6[L]  /  Alb  2.9[L]  /  TBili  0.5  /  DBili  x   /  AST  69[H]  /  ALT  47[H]  /  AlkPhos  95  04-30    LIVER FUNCTIONS - ( 30 Apr 2025 06:32 )  Alb: 2.9 g/dL / Pro: 5.6 g/dL / ALK PHOS: 95 U/L / ALT: 47 U/L / AST: 69 U/L / GGT: x             Culture - Blood (collected 04-23-25 @ 23:59)  Source: Blood Blood-Peripheral  Final Report (04-29-25 @ 09:01):    No growth at 5 days    Culture - Blood (collected 04-23-25 @ 23:59)  Source: Blood Blood-Peripheral  Final Report (04-29-25 @ 09:01):    No growth at 5 days    Culture - Urine (collected 04-23-25 @ 21:04)  Source: Catheterized Catheterized  Final Report (04-25-25 @ 09:37):    <10,000 CFU/mL Normal Urogenital Willa      Urinalysis Basic - ( 30 Apr 2025 06:32 )    Color: x / Appearance: x / SG: x / pH: x  Gluc: 131 mg/dL / Ketone: x  / Bili: x / Urobili: x   Blood: x / Protein: x / Nitrite: x   Leuk Esterase: x / RBC: x / WBC x   Sq Epi: x / Non Sq Epi: x / Bacteria: x          RADIOLOGY & ADDITIONAL TESTS:  Personal review of radiological diagnostics performed  Echo and EKG results noted when applicable.     MEDICATIONS:  acetaminophen     Tablet .. 650 milliGRAM(s) Oral every 6 hours PRN  aluminum hydroxide/magnesium hydroxide/simethicone Suspension 30 milliLiter(s) Oral every 4 hours PRN  aztreonam  IVPB 1000 milliGRAM(s) IV Intermittent every 12 hours  chlorhexidine 2% Cloths 1 Application(s) Topical <User Schedule>  lactated ringers. 1000 milliLiter(s) IV Continuous <Continuous>  melatonin 3 milliGRAM(s) Oral at bedtime PRN  midazolam Injectable 2 milliGRAM(s) IV Push once PRN  ondansetron Injectable 4 milliGRAM(s) IV Push every 8 hours PRN  rosuvastatin 5 milliGRAM(s) Oral at bedtime      ANTIBIOTICS:  aztreonam  IVPB 1000 milliGRAM(s) IV Intermittent every 12 hours

## 2025-04-30 NOTE — CONSULT NOTE ADULT - ASSESSMENT
75-year-old female past medical history of hyper tension hyperlipidemia brought in by  at bedside who is providing history for concern for UTI.  Patient's been having a slowly decreased decline in her mental status 4 days ago had a home test where she was found to have a UTI and it was positive. Her PCP prescribed nitrofurantoin 210 which patient is already took 4 doses however today noticed low grade fever which is what prompted the visit.      ID consulted for diagnostic work up and antimicrobial treatment of vertebral OM    Independent history obtained from her  Jose at the bedside     IMPRESSION/RECOMMENDATIONS  Immunosuppression/Immunosenescence ( above age 60 yrs there is a exponential decline in immunity which could result in poor clinical outcomes.  Abdominal/pelvic peritoneal masses; liver mass; splenic mass; colonic mass, osseus mets  Obstruction  by pelvic mass leading to left hydronephrosis  4/29 IVR S/p Bx of RUQ mass  Infectious Diseases called for possible vertebral OM  Spiked a fever post Bx  No sepsis  4/23 BCx NG  4/23 UCx NG  WBC 7.6    < from: CT Abdomen and Pelvis No Cont (04.25.25 @ 11:54) >  Lobulated pelvic masses, as above, limited without intravenous contrast.  Moderate left hydronephrosis extending into pelvis, likely obstructed by pelvic mass.  Perihepatic 6.2 x 5.0 cm irregular hypodensity/collection, limited in  evaluation without contrast.  Trace right pleural effusion with right basilar atelectasis.    < end of copied text >    < from: MR Lumbar Spine w/wo IV Cont (04.28.25 @ 20:58) >  1.  Findings consistent with widespread osseous metastatic disease  2.  No associated spinal stenosis.    < end of copied text >    hyper tension   hyperlipidemia     -Off loading to prevent pressure sores and preventive measures to avoid aspiration  -would not recommend empiric iv ABx  -would recommend monitoring lesion based on repeat MRIs rather than empiric ABX  -FU Bx    Discussion of management/test results( independently interpretated by me ) /antibiotic regimen  with external/primary medical team. Dr Hernández and her  Jose at the bedside

## 2025-04-30 NOTE — PROGRESS NOTE ADULT - ASSESSMENT
75-year-old female past medical history of hypertension, hyperlipidemia brought in by  at bedside for altered mental status.  CT abdomen/pelvis showed lobulated pelvic masses, moderate left hydronephrosis extending into pelvis, and perihepatic 6.2 x 5.0 cm irregular hypodensity/collection. EEG with generalized slowing without interictal activity or seizures. 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 Discussed GOC with . Given high suspicion for malignancy and grave prognosis, he would like to avoid invasive medical approaches. Explained the diagnostic importance of LP, but  declined.     Recommendations:   - Serum paraneoplastic panel to help with prognostic evaluation   - would recommend palliative consult  - No further neurological workup needed at this time, as  would not want to start immunotherapy even if paraneoplastic syndrome was confirmed.  75-year-old female past medical history of hypertension, hyperlipidemia brought in by  at bedside for altered mental status.  CT abdomen/pelvis showed lobulated pelvic masses, moderate left hydronephrosis extending into pelvis, and perihepatic 6.2 x 5.0 cm irregular hypodensity/collection. EEG with generalized slowing without interictal activity or seizures. 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 Discussed GOC with . Given high suspicion for malignancy and grave prognosis, he would like to avoid invasive medical approaches. Explained the diagnostic importance of LP, but  declined.     Recommendations:   - Serum paraneoplastic panel to help with prognostic evaluation   - would recommend palliative consult at family's request  - No further neurological workup needed at this time, as  would not want to start immunotherapy even if paraneoplastic syndrome was confirmed.   - call back as needed

## 2025-04-30 NOTE — PROGRESS NOTE ADULT - ASSESSMENT
75-year-old female past medical history of hyper tension hyperlipidemia brought in by  at bedside who is providing history for concern for UTI.  Patient's been having a slowly decreased decline in her mental status 4 days ago had a home test where she was found to have a UTI and it was positive. Her PCP prescribed nitrofurantoin 210 which patient is already took 4 doses however today noticed low grade fever which is what prompted the visit.  No chest pain, shortness of breath, nausea, vomiting, no flank pain. Upon further history, pt with gradual decline in mental status for months. Did not see neurologist yet. Very poor historian      # Pelvic and omental masses - suspected metastatic left ovarian cancer   # Moderate left hydronephrosis  # JAMIL on CKD3  CT A/p w/out contrast: lobulated liver contour is consistent with cirrhotic changes. Perihepatic 6.2 x 5.0 cm irregular hypodensity/collection. Moderate left hydronephrosis extending into pelvis, likely obstructed by pelvic mass. Anterior 3.6 cm omental mass, possibly connected to adjacent lobulated pelvic mass.  - s/p Azetreonam x3 , Urine culture is negative, DC antibiotic   -  TVUS as above  Appreciate GYN oncology, urology and interventional radiology input.    Tumor markers were sent Ca 125 is very high   4/26: House Staff discussed with interventional radiology, will plan to get CT abdomen pelvis with IV contrast , seems like the pelvic mass could be compressing the left ureter resulting in the moderate hydroureteronephrosis, Given that the patient presented with JAMIL that has been slowly improving, and received a contrast for MRI head yesterday, Will postpone getting CT abdomen pelvis with IV contrast till tomorrow April 27, also will plan to give IV fluid before and after the study   4/28 - CTAPw/PO and IV con:  -- Dominant left adnexal mass measuring up to 10 cm  --Lobulated heterogeneous uterus/uterine remnant may reflect another primary neoplasm.  --Focal circumferential wall thickening of the sigmoid colon measuring approximately 4.4 cm in length. It is not clear whether this reflects a metastatic lesion or another primary.  --Hepatic and splenic masses consistent with metastatic disease.  --Possible L4-L5 discitis/osteomyelitis. Further evaluation with MRI may be of benefit.  - IR to biopsy 4/29  - MRI lumbar w w/o con -  Findings consistent with widespread osseous metastatic disease,  No associated spinal stenosis.  - fu biopsy results    #fever  - 102 overnight 4/29-4/30  - fu bcx  - received 2 doses vanc  - on aztreonam  - fu ID  - fu lactate  - fu UA  - check RVP    #Acute change in mental status: paraneoplastic syndrome? 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

## 2025-04-30 NOTE — PROGRESS NOTE ADULT - ASSESSMENT
75-year-old female past medical history of hyper tension hyperlipidemia brought in by  at bedside who is providing history for concern for UTI.  Patient's been having a slowly decreased decline in her mental status 4 days ago had a home test where she was found to have a UTI and it was positive. Her PCP prescribed nitrofurantoin 210 which patient is already took 4 doses however today noticed low grade fever which is what prompted the visit.  No chest pain, shortness of breath, nausea, vomiting, no flank pain. Upon further history, pt with gradual decline in mental status for months. Did not see neurologist yet. Very poor historian    #JAMIL on CKD3  #Dementia vs paraneoplastic syndrome  #Rt hydro  #Pelvic and omental masses  CT A/p w/out contrast: lobulated liver contour is consistent with cirrhotic changes. Perihepatic 6.2 x 5.0 cm irregular hypodensity/collection. Moderate left hydronephrosis extending into pelvis, likely obstructed by pelvic mass. Anterior 3.6 cm omental mass, possibly connected to adjacent lobulated pelvic mass.  - s/p Azetreonam x3   - cont IVF   -CTH NG  -NG TSH, B12, RPR, ammonia  < from: CT Abdomen and Pelvis w/ Oral Cont and w/ IV Cont (04.27.25 @ 14:47) >  Dominant left adnexal mass measuring up to 10 cm    Lobulated heterogeneous uterus/uterine remnant may reflect another   primary neoplasm.    Focal circumferential wall thickening of thesigmoid colon measuring   approximately 4.4 cm in length. It is not clear whether this reflects a   metastatic lesion or another primary.    Hepatic and splenic masses consistent with metastatic disease.    Possible L4-L5 discitis/osteomyelitis. Further evaluation with MRI may be   of benefit.    < end of copied text >  < from: US Pelvis Complete (04.26.25 @ 10:18) >  Complex pelvic masses with internal vascularity at least one of which   appears related to the left ovary and the other one may be right ovarian   or uterine in origin difficult to definitively delineate on sonogram.    < end of copied text >  < from: MR Head w/wo IV Cont (04.25.25 @ 22:17) >    No acute intracranial pathology or abnormal enhancement.  < end of copied text >  -s/p Bx on 4/29  -MRI L spine w/ and w/out contrast: multiple spine mets  -Lt nephrostomy if Cr worsens    Fever on 4/30  pancultured  received IV ABx  agree w/ ID to monitor off ABx    #Anemia  - MCV WNL  - Likely CKD, malignancy related  - Iron, folate, b12, ferritin  - T+S    #HTN/HLD  - monitor; meds as appropriate    DVT: SQ hep  Diet: Renal  Act: Assist  Dispo: Med    GOC: d/w  who agrees w/ DNR/DNI    #Progress Note Handoff  Pending: Clinical improvement and stability__x_  Pt/Family discussion: Pt/family informed and agree with the current plan  Disposition: Home_    My note supersedes the residents note should a discrepancy arise.    Chart and notes personally reviewed.  Care Discussed with Consultants/Other Providers/ Housestaff [ x] YES [ ] NO   Radiology, labs, old records personally reviewed.    discussed w/ housestaff, nursing, case management, MILTON    Attestation Statements:    Attestation Statements:  Risk Statement (NON-critical care).     On this date of service, level of risk to patient is considered: High.     Due to: pt with illness causing risk to life or bodily fxn    Time-based billing (NON-critical care).     50 minutes spent on total encounter. The necessity of the time spent during the encounter on this date of service was due to:     time spent on review of labs, imaging studies, old records, obtaining history, personally examining patient, counselling and communicating with patient/ family, entering orders for medications/tests/etc, discussions with other health care providers, documentation in electronic health records, independent interpretation of labs, imaging/procedure results and care coordination.

## 2025-04-30 NOTE — CONSULT NOTE ADULT - TIME BILLING
Review of imaging and chart; obtaining history; examination of patient; discussion and coordination of care.
the patient has hydronephrosis down to the level of a pelvic mass  recommend IR biopsy of pelvic mass  recommend IR for LEFT nephrostomy tube  no acute urologic intervention
-My assessment required my independent history taking and time required is independent of the teaching service  -I independently interpreted the most recent imaging ( CXR ) and all available labs ( CBC, CMP) and cultures ( along with the sensitivities / REANNA )  -Time excludes teaching time  -I reviewed all prior tests and documents  -I discussed my recommendations with the primary team housestaff/Attending  -I assisted with initiation of antibiotics

## 2025-05-01 LAB
ALBUMIN SERPL ELPH-MCNC: 2.8 G/DL — LOW (ref 3.5–5.2)
ALP SERPL-CCNC: 105 U/L — SIGNIFICANT CHANGE UP (ref 30–115)
ALT FLD-CCNC: 38 U/L — SIGNIFICANT CHANGE UP (ref 0–41)
ANION GAP SERPL CALC-SCNC: 12 MMOL/L — SIGNIFICANT CHANGE UP (ref 7–14)
AST SERPL-CCNC: 56 U/L — HIGH (ref 0–41)
BASOPHILS # BLD AUTO: 0.02 K/UL — SIGNIFICANT CHANGE UP (ref 0–0.2)
BASOPHILS NFR BLD AUTO: 0.2 % — SIGNIFICANT CHANGE UP (ref 0–1)
BILIRUB SERPL-MCNC: 0.4 MG/DL — SIGNIFICANT CHANGE UP (ref 0.2–1.2)
BUN SERPL-MCNC: 41 MG/DL — HIGH (ref 10–20)
CALCIUM SERPL-MCNC: 8.5 MG/DL — SIGNIFICANT CHANGE UP (ref 8.4–10.5)
CHLORIDE SERPL-SCNC: 108 MMOL/L — SIGNIFICANT CHANGE UP (ref 98–110)
CO2 SERPL-SCNC: 21 MMOL/L — SIGNIFICANT CHANGE UP (ref 17–32)
CREAT SERPL-MCNC: 1.9 MG/DL — HIGH (ref 0.7–1.5)
CULTURE RESULTS: NO GROWTH — SIGNIFICANT CHANGE UP
EGFR: 27 ML/MIN/1.73M2 — LOW
EGFR: 27 ML/MIN/1.73M2 — LOW
EOSINOPHIL # BLD AUTO: 0.08 K/UL — SIGNIFICANT CHANGE UP (ref 0–0.7)
EOSINOPHIL NFR BLD AUTO: 0.9 % — SIGNIFICANT CHANGE UP (ref 0–8)
GLUCOSE SERPL-MCNC: 94 MG/DL — SIGNIFICANT CHANGE UP (ref 70–99)
HCT VFR BLD CALC: 25.6 % — LOW (ref 37–47)
HGB BLD-MCNC: 8 G/DL — LOW (ref 12–16)
IMM GRANULOCYTES NFR BLD AUTO: 1.7 % — HIGH (ref 0.1–0.3)
INHIBIN B SERUM: <7 PG/ML — SIGNIFICANT CHANGE UP (ref 0–16.9)
LYMPHOCYTES # BLD AUTO: 0.46 K/UL — LOW (ref 1.2–3.4)
LYMPHOCYTES # BLD AUTO: 5.3 % — LOW (ref 20.5–51.1)
MAGNESIUM SERPL-MCNC: 2.3 MG/DL — SIGNIFICANT CHANGE UP (ref 1.8–2.4)
MCHC RBC-ENTMCNC: 28.8 PG — SIGNIFICANT CHANGE UP (ref 27–31)
MCHC RBC-ENTMCNC: 31.3 G/DL — LOW (ref 32–37)
MCV RBC AUTO: 92.1 FL — SIGNIFICANT CHANGE UP (ref 81–99)
MONOCYTES # BLD AUTO: 0.85 K/UL — HIGH (ref 0.1–0.6)
MONOCYTES NFR BLD AUTO: 9.7 % — HIGH (ref 1.7–9.3)
NEUTROPHILS # BLD AUTO: 7.2 K/UL — HIGH (ref 1.4–6.5)
NEUTROPHILS NFR BLD AUTO: 82.2 % — HIGH (ref 42.2–75.2)
NRBC BLD AUTO-RTO: 0 /100 WBCS — SIGNIFICANT CHANGE UP (ref 0–0)
PLATELET # BLD AUTO: 263 K/UL — SIGNIFICANT CHANGE UP (ref 130–400)
PMV BLD: 12.3 FL — HIGH (ref 7.4–10.4)
POTASSIUM SERPL-MCNC: 4.6 MMOL/L — SIGNIFICANT CHANGE UP (ref 3.5–5)
POTASSIUM SERPL-SCNC: 4.6 MMOL/L — SIGNIFICANT CHANGE UP (ref 3.5–5)
PROT SERPL-MCNC: 6.1 G/DL — SIGNIFICANT CHANGE UP (ref 6–8)
RBC # BLD: 2.78 M/UL — LOW (ref 4.2–5.4)
RBC # FLD: 13.5 % — SIGNIFICANT CHANGE UP (ref 11.5–14.5)
SODIUM SERPL-SCNC: 141 MMOL/L — SIGNIFICANT CHANGE UP (ref 135–146)
SPECIMEN SOURCE: SIGNIFICANT CHANGE UP
WBC # BLD: 8.76 K/UL — SIGNIFICANT CHANGE UP (ref 4.8–10.8)
WBC # FLD AUTO: 8.76 K/UL — SIGNIFICANT CHANGE UP (ref 4.8–10.8)

## 2025-05-01 PROCEDURE — 99233 SBSQ HOSP IP/OBS HIGH 50: CPT

## 2025-05-01 RX ADMIN — Medication 650 MILLIGRAM(S): at 20:36

## 2025-05-01 RX ADMIN — ROSUVASTATIN CALCIUM 5 MILLIGRAM(S): 20 TABLET, FILM COATED ORAL at 21:42

## 2025-05-01 RX ADMIN — HEPARIN SODIUM 5000 UNIT(S): 1000 INJECTION INTRAVENOUS; SUBCUTANEOUS at 14:14

## 2025-05-01 RX ADMIN — HEPARIN SODIUM 5000 UNIT(S): 1000 INJECTION INTRAVENOUS; SUBCUTANEOUS at 21:42

## 2025-05-01 RX ADMIN — Medication 1 APPLICATION(S): at 05:08

## 2025-05-01 RX ADMIN — HEPARIN SODIUM 5000 UNIT(S): 1000 INJECTION INTRAVENOUS; SUBCUTANEOUS at 05:11

## 2025-05-01 NOTE — PROGRESS NOTE ADULT - ASSESSMENT
75-year-old female past medical history of hyper tension hyperlipidemia brought in by  at bedside who is providing history for concern for UTI.  Patient's been having a slowly decreased decline in her mental status 4 days ago had a home test where she was found to have a UTI and it was positive. Her PCP prescribed nitrofurantoin 210 which patient is already took 4 doses however today noticed low grade fever which is what prompted the visit.  No chest pain, shortness of breath, nausea, vomiting, no flank pain. Upon further history, pt with gradual decline in mental status for months. Did not see neurologist yet. Very poor historian    #JAMIL on CKD3  #Dementia vs paraneoplastic syndrome  #Rt hydro  #Pelvic and omental masses  CT A/p w/out contrast: lobulated liver contour is consistent with cirrhotic changes. Perihepatic 6.2 x 5.0 cm irregular hypodensity/collection. Moderate left hydronephrosis extending into pelvis, likely obstructed by pelvic mass. Anterior 3.6 cm omental mass, possibly connected to adjacent lobulated pelvic mass.  - s/p Azetreonam x3   - cont IVF   -CTH NG  -NG TSH, B12, RPR, ammonia  < from: CT Abdomen and Pelvis w/ Oral Cont and w/ IV Cont (04.27.25 @ 14:47) >  Dominant left adnexal mass measuring up to 10 cm    Lobulated heterogeneous uterus/uterine remnant may reflect another   primary neoplasm.    Focal circumferential wall thickening of thesigmoid colon measuring   approximately 4.4 cm in length. It is not clear whether this reflects a   metastatic lesion or another primary.    Hepatic and splenic masses consistent with metastatic disease.    Possible L4-L5 discitis/osteomyelitis. Further evaluation with MRI may be   of benefit.    < end of copied text >  < from: US Pelvis Complete (04.26.25 @ 10:18) >  Complex pelvic masses with internal vascularity at least one of which   appears related to the left ovary and the other one may be right ovarian   or uterine in origin difficult to definitively delineate on sonogram.    < end of copied text >  < from: MR Head w/wo IV Cont (04.25.25 @ 22:17) >    No acute intracranial pathology or abnormal enhancement.  < end of copied text >  -s/p Bx on 4/29  -MRI L spine w/ and w/out contrast: multiple spine mets  -?Lt nephrostomy if Cr worsens ( is undecided and wants to wait till Bx results)    Fever on 4/30  pancultured, urine w/ mild pyuria  received IV ABx  agree w/ ID to monitor off ABx    #Anemia  - MCV WNL  - Likely CKD, malignancy related  - Iron, folate, b12, ferritin  - T+S    #HTN/HLD  - monitor; meds as appropriate    DVT: SQ hep  Diet: Renal  Act: Assist  Dispo: Med    GOC: d/w  who agrees w/ DNR/DNI. Agrees to speak to palliative team    #Progress Note Handoff  Pending: Clinical improvement and stability__x_Afebrile x24hrs  Pt/Family discussion: Pt/family informed and agree with the current plan  Disposition: Home_    My note supersedes the residents note should a discrepancy arise.    Chart and notes personally reviewed.  Care Discussed with Consultants/Other Providers/ Housestaff [ x] YES [ ] NO   Radiology, labs, old records personally reviewed.    discussed w/ housestaff, nursing, case management, MILTON,     Attestation Statements:    Attestation Statements:  Risk Statement (NON-critical care).     On this date of service, level of risk to patient is considered: High.     Due to: pt with illness causing risk to life or bodily fxn    Time-based billing (NON-critical care).     50 minutes spent on total encounter. The necessity of the time spent during the encounter on this date of service was due to:     time spent on review of labs, imaging studies, old records, obtaining history, personally examining patient, counselling and communicating with patient/ family, entering orders for medications/tests/etc, discussions with other health care providers, documentation in electronic health records, independent interpretation of labs, imaging/procedure results and care coordination.

## 2025-05-01 NOTE — PROGRESS NOTE ADULT - ASSESSMENT
acute kidney injury  CKD stage 3   left hydronephrosis due to pelvic mass  fever, abnl UA  left adnexal mass / heterogeneous uterus / splenic and hepatic masses / focal colon thickening   probable metastatic ovarian Ca ( very elevated)  metabolic acidosis  AMS / dementia   wt loss / poor PO intake  anemia    HTN    plan:    check urine culture  consider renal dose abx for possible UTI   and gyn-onc recommending PCN or ureteral stent  I also recommend PCN vs ureteral stent, d/w team and , who wants to wait   cont off oral bicarb  cont off olmesartan   encourage po hydration   f/u path   f/u  / gyn-onc    trend renal function and lytes

## 2025-05-01 NOTE — PROGRESS NOTE ADULT - ASSESSMENT
75-year-old female past medical history of hyper tension hyperlipidemia brought in by  at bedside who is providing history for concern for UTI.  Patient's been having a slowly decreased decline in her mental status 4 days ago had a home test where she was found to have a UTI and it was positive. Her PCP prescribed nitrofurantoin 210 which patient is already took 4 doses however today noticed low grade fever which is what prompted the visit.  No chest pain, shortness of breath, nausea, vomiting, no flank pain. Upon further history, pt with gradual decline in mental status for months. Did not see neurologist yet. Very poor historian      # Pelvic and omental masses - suspected metastatic left ovarian cancer   # Moderate left hydronephrosis  # JAMIL on CKD3  CT A/p w/out contrast: lobulated liver contour is consistent with cirrhotic changes. Perihepatic 6.2 x 5.0 cm irregular hypodensity/collection. Moderate left hydronephrosis extending into pelvis, likely obstructed by pelvic mass. Anterior 3.6 cm omental mass, possibly connected to adjacent lobulated pelvic mass.  - s/p Azetreonam x3 , Urine culture is negative, DC antibiotic   -  TVUS as above  Appreciate GYN oncology, urology and interventional radiology input.    Tumor markers were sent Ca 125 is very high   4/26: House Staff discussed with interventional radiology, will plan to get CT abdomen pelvis with IV contrast , seems like the pelvic mass could be compressing the left ureter resulting in the moderate hydroureteronephrosis, Given that the patient presented with JAMIL that has been slowly improving, and received a contrast for MRI head yesterday, Will postpone getting CT abdomen pelvis with IV contrast till tomorrow April 27, also will plan to give IV fluid before and after the study   4/28 - CTAPw/PO and IV con:  -- Dominant left adnexal mass measuring up to 10 cm  --Lobulated heterogeneous uterus/uterine remnant may reflect another primary neoplasm.  --Focal circumferential wall thickening of the sigmoid colon measuring approximately 4.4 cm in length. It is not clear whether this reflects a metastatic lesion or another primary.  --Hepatic and splenic masses consistent with metastatic disease.  --Possible L4-L5 discitis/osteomyelitis. Further evaluation with MRI may be of benefit.  - IR to biopsy 4/29  - MRI lumbar w w/o con -  Findings consistent with widespread osseous metastatic disease,  No associated spinal stenosis.  - fu biopsy results  - nephro considering PCN placement    #fever  - 102 overnight 4/29-4/30  - fu bcx  -a/p vanc and aztrenonam  - rvp neg  - another fever 4/30 melony  - monitor off abx as of 5/1      #Acute change in mental status: paraneoplastic syndrome? vs metabolic encephalopathy , less likely dementia   -per  , acute change in mental status over the last 3-4 weeks  -Appreciate neurology input: Most likely presentation consistent with toxic or metabolic encephalopathy, but given the finding of the new masses paraneoplastic encephalitis on the differential  -Patient had routine EEG with generalized slowing  -MRI brain with and without contrast no acute pathology as above  - if mental status worsens obtain LP per neuro 4/2      #Anemia  - MCV WNL  - Likely CKD related  - Iron, folate, b12, ferritin  - T+S    #HTN/HLD  - monitor; meds as appropriate

## 2025-05-02 ENCOUNTER — TRANSCRIPTION ENCOUNTER (OUTPATIENT)
Age: 75
End: 2025-05-02

## 2025-05-02 ENCOUNTER — NON-APPOINTMENT (OUTPATIENT)
Age: 75
End: 2025-05-02

## 2025-05-02 VITALS
DIASTOLIC BLOOD PRESSURE: 72 MMHG | SYSTOLIC BLOOD PRESSURE: 144 MMHG | OXYGEN SATURATION: 97 % | HEART RATE: 69 BPM | TEMPERATURE: 98 F | RESPIRATION RATE: 18 BRPM

## 2025-05-02 DIAGNOSIS — K66.8 OTHER SPECIFIED DISORDERS OF PERITONEUM: ICD-10-CM

## 2025-05-02 DIAGNOSIS — R19.00 INTRA-ABDOMINAL AND PELVIC SWELLING, MASS AND LUMP, UNSPECIFIED SITE: ICD-10-CM

## 2025-05-02 DIAGNOSIS — N17.9 ACUTE KIDNEY FAILURE, UNSPECIFIED: ICD-10-CM

## 2025-05-02 DIAGNOSIS — R41.82 ALTERED MENTAL STATUS, UNSPECIFIED: ICD-10-CM

## 2025-05-02 DIAGNOSIS — Z51.5 ENCOUNTER FOR PALLIATIVE CARE: ICD-10-CM

## 2025-05-02 DIAGNOSIS — Z71.89 OTHER SPECIFIED COUNSELING: ICD-10-CM

## 2025-05-02 LAB
ALBUMIN SERPL ELPH-MCNC: 2.8 G/DL — LOW (ref 3.5–5.2)
ALP SERPL-CCNC: 101 U/L — SIGNIFICANT CHANGE UP (ref 30–115)
ALT FLD-CCNC: 30 U/L — SIGNIFICANT CHANGE UP (ref 0–41)
ANION GAP SERPL CALC-SCNC: 11 MMOL/L — SIGNIFICANT CHANGE UP (ref 7–14)
AST SERPL-CCNC: 48 U/L — HIGH (ref 0–41)
BASOPHILS # BLD AUTO: 0 K/UL — SIGNIFICANT CHANGE UP (ref 0–0.2)
BASOPHILS NFR BLD AUTO: 0 % — SIGNIFICANT CHANGE UP (ref 0–1)
BILIRUB SERPL-MCNC: 0.4 MG/DL — SIGNIFICANT CHANGE UP (ref 0.2–1.2)
BUN SERPL-MCNC: 41 MG/DL — HIGH (ref 10–20)
CALCIUM SERPL-MCNC: 8.2 MG/DL — LOW (ref 8.4–10.5)
CHLORIDE SERPL-SCNC: 107 MMOL/L — SIGNIFICANT CHANGE UP (ref 98–110)
CO2 SERPL-SCNC: 20 MMOL/L — SIGNIFICANT CHANGE UP (ref 17–32)
CREAT SERPL-MCNC: 1.9 MG/DL — HIGH (ref 0.7–1.5)
EGFR: 27 ML/MIN/1.73M2 — LOW
EGFR: 27 ML/MIN/1.73M2 — LOW
EOSINOPHIL # BLD AUTO: 0 K/UL — SIGNIFICANT CHANGE UP (ref 0–0.7)
EOSINOPHIL NFR BLD AUTO: 0 % — SIGNIFICANT CHANGE UP (ref 0–8)
GLUCOSE SERPL-MCNC: 110 MG/DL — HIGH (ref 70–99)
HCT VFR BLD CALC: 24.7 % — LOW (ref 37–47)
HGB BLD-MCNC: 7.8 G/DL — LOW (ref 12–16)
LYMPHOCYTES # BLD AUTO: 0.36 K/UL — LOW (ref 1.2–3.4)
LYMPHOCYTES # BLD AUTO: 4.5 % — LOW (ref 20.5–51.1)
MAGNESIUM SERPL-MCNC: 2.2 MG/DL — SIGNIFICANT CHANGE UP (ref 1.8–2.4)
MCHC RBC-ENTMCNC: 29 PG — SIGNIFICANT CHANGE UP (ref 27–31)
MCHC RBC-ENTMCNC: 31.6 G/DL — LOW (ref 32–37)
MCV RBC AUTO: 91.8 FL — SIGNIFICANT CHANGE UP (ref 81–99)
MONOCYTES # BLD AUTO: 0.79 K/UL — HIGH (ref 0.1–0.6)
MONOCYTES NFR BLD AUTO: 9.9 % — HIGH (ref 1.7–9.3)
NEUTROPHILS # BLD AUTO: 6.71 K/UL — HIGH (ref 1.4–6.5)
NEUTROPHILS NFR BLD AUTO: 83.8 % — HIGH (ref 42.2–75.2)
PLATELET # BLD AUTO: 262 K/UL — SIGNIFICANT CHANGE UP (ref 130–400)
PMV BLD: 12.4 FL — HIGH (ref 7.4–10.4)
POTASSIUM SERPL-MCNC: 4.5 MMOL/L — SIGNIFICANT CHANGE UP (ref 3.5–5)
POTASSIUM SERPL-SCNC: 4.5 MMOL/L — SIGNIFICANT CHANGE UP (ref 3.5–5)
PROT SERPL-MCNC: 6.1 G/DL — SIGNIFICANT CHANGE UP (ref 6–8)
RBC # BLD: 2.69 M/UL — LOW (ref 4.2–5.4)
RBC # FLD: 13.6 % — SIGNIFICANT CHANGE UP (ref 11.5–14.5)
SODIUM SERPL-SCNC: 138 MMOL/L — SIGNIFICANT CHANGE UP (ref 135–146)
WBC # BLD: 8.01 K/UL — SIGNIFICANT CHANGE UP (ref 4.8–10.8)
WBC # FLD AUTO: 8.01 K/UL — SIGNIFICANT CHANGE UP (ref 4.8–10.8)

## 2025-05-02 PROCEDURE — 99222 1ST HOSP IP/OBS MODERATE 55: CPT

## 2025-05-02 PROCEDURE — 99497 ADVNCD CARE PLAN 30 MIN: CPT | Mod: 25

## 2025-05-02 PROCEDURE — 99239 HOSP IP/OBS DSCHRG MGMT >30: CPT

## 2025-05-02 RX ORDER — AMLODIPINE BESYLATE 10 MG/1
5 TABLET ORAL DAILY
Refills: 0 | Status: DISCONTINUED | OUTPATIENT
Start: 2025-05-02 | End: 2025-05-02

## 2025-05-02 RX ORDER — AMLODIPINE BESYLATE 10 MG/1
1 TABLET ORAL
Qty: 30 | Refills: 0
Start: 2025-05-02

## 2025-05-02 RX ADMIN — AMLODIPINE BESYLATE 5 MILLIGRAM(S): 10 TABLET ORAL at 13:38

## 2025-05-02 RX ADMIN — HEPARIN SODIUM 5000 UNIT(S): 1000 INJECTION INTRAVENOUS; SUBCUTANEOUS at 06:22

## 2025-05-02 RX ADMIN — Medication 1 APPLICATION(S): at 06:23

## 2025-05-02 RX ADMIN — HEPARIN SODIUM 5000 UNIT(S): 1000 INJECTION INTRAVENOUS; SUBCUTANEOUS at 13:38

## 2025-05-02 NOTE — CONSULT NOTE ADULT - SUBJECTIVE AND OBJECTIVE BOX
CC: UTI    HPI:  75-year-old female past medical history of hyper tension hyperlipidemia brought in by  at bedside who is providing history for concern for UTI.  Patient's been having a slowly decreased decline in her mental status 4 days ago had a home test where she was found to have a UTI and it was positive. Her PCP prescribed nitrofurantoin 210 which patient is already took 4 doses however today noticed low grade fever which is what prompted the visit.  No chest pain, shortness of breath, nausea, vomiting, no flank pain.     (24 Apr 2025 08:56)    PERTINENT PM/SXH:   HTN (hypertension)    HLD (hyperlipidemia)    Cataract      S/P liudmila    H/O lumpectomy    History of dilation and curettage    Status post cataract extraction and insertion of intraocular lens, left      FAMILY HISTORY:    None pertinent    ITEMS NOT CHECKED ARE NOT PRESENT    SOCIAL HISTORY:   Significant other/partner[ ]  Children[ ]  Amish/Spirituality:  Substance hx:  [ ]   Tobacco hx:  [ ]   Alcohol hx: [ ]   Living Situation: [x ]Home  [ ]Long term care  [ ]Rehab [ ]Other  Home Services: [ ] HHA [ ] Visting RN [ ] Hospice  Occupation:  Home Opioid hx:  [ ] Y [ ] N [ x] I-Stop Reference No:       Reference #: [id number] - no meds     ADVANCE DIRECTIVES:     [ ] Full Code [ x] DNR  MOLST  [ ]  Living Will  [ ]   DECISION MAKER(s):  [ ] Health Care Proxy(s)  [x ] Surrogate(s)  [ ] Guardian           Name(s): Phone Number(s):  Jose Dhaliwal      BASELINE (I)ADL(s) (prior to admission):    [place]: [ ]Total  [ ] Moderate [ ]Dependent  Palliative Performance Status Version 2:         %    http://npcrc.org/files/news/palliative_performance_scale_ppsv2.pdf    Allergies    penicillin (Unknown)  latex (Rash)    Intolerances    MEDICATIONS  (STANDING):  amLODIPine   Tablet 5 milliGRAM(s) Oral daily  chlorhexidine 2% Cloths 1 Application(s) Topical <User Schedule>  heparin   Injectable 5000 Unit(s) SubCutaneous every 8 hours  lactated ringers. 1000 milliLiter(s) (75 mL/Hr) IV Continuous <Continuous>  rosuvastatin 5 milliGRAM(s) Oral at bedtime    MEDICATIONS  (PRN):  acetaminophen     Tablet .. 650 milliGRAM(s) Oral every 6 hours PRN Temp greater or equal to 38C (100.4F), Mild Pain (1 - 3)  aluminum hydroxide/magnesium hydroxide/simethicone Suspension 30 milliLiter(s) Oral every 4 hours PRN Dyspepsia  melatonin 3 milliGRAM(s) Oral at bedtime PRN Insomnia  midazolam Injectable 2 milliGRAM(s) IV Push once PRN pre-MRI  ondansetron Injectable 4 milliGRAM(s) IV Push every 8 hours PRN Nausea and/or Vomiting    PRESENT SYMPTOMS: [ ]Unable to obtain due to poor mentation   Source if other than patient:  [ ]Family   [ ]Team     Pain: [ ]yes [ x]no  ALL PAIN ASSESSMENT COMPONENTS WERE ASKED ABOUT UNLESS OTHERWISE NOTED  QOL impact -   Location -                    Aggravating factors -  Quality -  Radiation -  Timing-  Severity (0-10 scale):  Minimal acceptable level (0-10 scale):     CPOT:    https://www.[facility].org/getattachment/euo28x19-6s5d-7r2f-4b4i-[email or web address]/Critical-Care-Pain-Observation-Tool-(CPOT)    PAIN AD Score:   http://geriatrictoolkit.[facility]/cog/painad.pdf (press ctrl +  left click to view)    Dyspnea:                           [ z]None[ ]Mild [ ]Moderate [ ]Severe     Respiratory Distress Observation Scale (RDOS):   A score of 0 to 2 signifies little or no respiratory distress, 3 signifies mild distress, scores 4 to 6 indicate moderate distress, and scores greater than 7 signify severe distress  https://www.[facility].ca/sites/default/files/PDFS/126444-ftvpxnwfjqm-rbgvxlio-bhpslpzwlfp-pwudj.pdf    Anxiety:                             [ ]None[ ]Mild [ ]Moderate [ ]Severe   Fatigue:                             [ ]None[ ]Mild [ ]Moderate [ ]Severe   Nausea:                             [ ]None[ ]Mild [ ]Moderate [ ]Severe   Loss of appetite:              [ ]None[ ]Mild [ ]Moderate [ ]Severe   Constipation:                    [ ]None[ ]Mild [ ]Moderate [ ]Severe    Other Symptoms:  [x ]All other review of systems negative     Palliative Performance Status Version 2:         30%    http://[facility].org/files/news/palliative_performance_scale_ppsv2.pdf    PHYSICAL EXAM:  Vital Signs Last 24 Hrs  T(C): 36.4 (02 May 2025 12:40), Max: 36.8 (02 May 2025 04:25)  T(F): 97.6 (02 May 2025 12:40), Max: 98.2 (02 May 2025 04:25)  HR: 69 (02 May 2025 12:40) (69 - 69)  BP: 144/72 (02 May 2025 12:40) (144/72 - 155/69)  BP(mean): 96 (02 May 2025 12:40) (96 - 98)  RR: 18 (02 May 2025 12:40) (18 - 18)  SpO2: 97% (02 May 2025 12:40) (97% - 97%)    Parameters below as of 02 May 2025 12:40  Patient On (Oxygen Delivery Method): room air     I&O's Summary    01 May 2025 07:01  -  02 May 2025 07:00  --------------------------------------------------------  IN: 0 mL / OUT: 0 mL / NET: 0 mL        GENERAL:  [ ] No acute distress [ ]Lethargic  [ ]Unarousable  [ x]Verbal  [ ]Non-Verbal [ ]Cachexia    BEHAVIORAL/PSYCH:  [ ]Alert and Oriented x  [ ] Anxiety [ ] Delirium [ ] Agitation [ ] Calm   EYES: [ x] No scleral icterus [ ] Scleral icterus [ ] Closed  ENMT:  [ ]Dry mouth  [x ]No external oral lesions [ ] No external ear or nose lesions  CARDIOVASCULAR:  [ ]Regular [ ]Irregular [ ]Tachy [ ]Not Tachy  [ ]Gavino [ ] Edema [ ] No edema  PULMONARY:  [ ]Tachypnea  [ ]Audible excessive secretions [x ] No labored breathing [ ] labored breathing  GASTROINTESTINAL: [ ]Soft  [ ]Distended  [ ]Not distended [ ]Non tender [ ]Tender  MUSCULOSKELETAL: [ ]No clubbing [ ] clubbing  [ ] No cyanosis [ ] cyanosis  NEUROLOGIC: [ ]No focal deficits  [x ]Follows commands  [ ]Does not follow commands  [ ]Cognitive impairment  [ ]Dysphagia  [ ]Dysarthria  [ ]Paresis   SKIN: [ ] Jaundiced [x ] Non-jaundiced [ ]Rash [ ]No Rash [ ] Warm [ ] Dry  MISC/LINES: [ ] ET tube [ ] Trach [ ]NGT/OGT [ ]PEG [ ][place]    LABS: reviewed by me BMP shows elevated creatinine, CBC shows anemia, calcium is low                        7.8    8.01  )-----------( 262      ( 02 May 2025 06:47 )             24.7   05-02    138  |  107  |  41[H]  ----------------------------<  110[H]  4.5   |  20  |  1.9[H]    Ca    8.2[L]      02 May 2025 06:47  Mg     2.2     05-02    TPro  6.1  /  Alb  2.8[L]  /  TBili  0.4  /  DBili  x   /  AST  48[H]  /  ALT  30  /  AlkPhos  101  05-02      Urinalysis Basic - ( 02 May 2025 06:47 )    Color: x / Appearance: x / SG: x / pH: x  Gluc: 110 mg/dL / Ketone: x  / Bili: x / Urobili: x   Blood: x / Protein: x / Nitrite: x   Leuk Esterase: x / RBC: x / WBC x   Sq Epi: x / Non Sq Epi: x / Bacteria: x      RADIOLOGY & ADDITIONAL STUDIES: reviewed by me    < from: MR Lumbar Spine w/wo IV Cont (04.28.25 @ 20:58) >    1.  Findings consistent with widespread osseous metastatic disease    2.  No associated spinal stenosis.    < end of copied text >      EKG: reviewed by me    < from: 12 Lead ECG (04.24.25 @ 01:59) >  Ventricular Rate 83 BPM    Atrial Rate 83 BPM    P-R Interval 162 ms    QRS Duration 100 ms    Q-T Interval 354 ms    QTC Calculation(Bazett) 415 ms    P Axis 63 degrees    R Axis -49 degrees    T Axis 53 degrees    Diagnosis Line Sinus rhythm withPremature atrial complexes  Incomplete right bundle branch block  Left anterior fascicular block  Moderate voltage criteria for LVH, may be normal variant ( R in aVL , [employer]  product )  Abnormal ECG    < end of copied text >      PROTEIN CALORIE MALNUTRITION PRESENT: [ ]mild [ ]moderate [ ]severe [ ]underweight [ ]morbid obesity  https://www.andeal.org/vault/2440/web/files/ONC/Table_Clinical%20Characteristics%20to%20Document%20Malnutrition-White%20JV%20et%20al%202012.pdf    Height (cm): 154.9 (04-29-25 @ 08:00)  Weight (kg): 74.8 (04-29-25 @ 08:00)  BMI (kg/m2): 31.2 (04-29-25 @ 08:00)  [ ]PPSV2 < or = to 30% [ ]significant weight loss  [ ]poor nutritional intake  [ ]anasarca      [ ]Artificial Nutrition      Palliative Care Spiritual/Emotional Screening Tool Question  Severity (0-4):                    OR                    [ x] Unable to determine. Will assess at later time if appropriate.  Score of 2 or greater indicates recommendation of Chaplaincy and/or SW referral  Chaplaincy Referral: [ ] Yes [ ] Refused [ ] Following     Caregiver [place]:  [ ] Yes [ ] No    OR    [x ] Unable to determine. Will assess at later time if appropriate.  Social Work Referral [ ]  Patient and Family Centered Care Referral [ ]    Anticipatory Grief Present: [ ] Yes [ ] No    OR     [ x] Unable to determine. Will assess at later time if appropriate.  Social Work Referral [ ]  Patient and Family Centered Care Referral [ ]    Patient discussed with primary medical team MD  Palliative care education provided to patient and/or family

## 2025-05-02 NOTE — DISCHARGE NOTE PROVIDER - CARE PROVIDERS DIRECT ADDRESSES
[email or web address]

## 2025-05-02 NOTE — DISCHARGE NOTE PROVIDER - NSDCCPCAREPLAN_GEN_ALL_CORE_FT
PRINCIPAL DISCHARGE DIAGNOSIS  Diagnosis: Altered mental status  Assessment and Plan of Treatment: You came to the hospital because of a concernfor  some changes in your mental status. You had started an antibiotic, Nitrofurantoin, after a positive home test for UTI, but a low-grade fever prompted your visit here.  During your stay, we conducted several tests and found some important information. Imaging revealed pelvic and omental masses, which raised suspicion for metastatic left ovarian cancer. There was also moderate left hydronephrosis, likely due to the pelvic mass pressing on your left ureter. In addition, we found changes in your liver consistent with cirrhosis and hypodense areas that required further investigation. Further CT scans with contrast showed a large mass on the left adnexal area, possible secondary neoplasms in your uterus, and thickening in the sigmoid colon, indicating either metastatic or new primary tumors. Masses were also seen in your liver and spleen, suggesting possible metastasis. We did a biopsy to get more definitive information, and those results are still pending.  You've had episodes of fever, but you've been fever-free for the past 24 hours and are now off antibiotics. Your change in mental status might be related to something called paraneoplastic syndrome or a metabolic imbalance rather than a worsening of potential dementia. Routine tests, such as an EEG, show a pattern consistent with these metabolic issues.  As you prepare to go home, it’s crucial that you follow up with your healthcare team, particularly once the biopsy results are available, as they will guide further treatment decisions.. Please follow up with your PCP and an oncologist.      SECONDARY DISCHARGE DIAGNOSES  Diagnosis: Confusion  Assessment and Plan of Treatment:      PRINCIPAL DISCHARGE DIAGNOSIS  Diagnosis: Altered mental status  Assessment and Plan of Treatment: You came to the hospital because of a concern for  some changes in your mental status. You had started an antibiotic, Nitrofurantoin, after a positive home test for UTI, but a low-grade fever prompted your visit here.  During your stay, we conducted several tests and found some important information. Imaging revealed pelvic and omental masses, which raised suspicion for metastatic left ovarian cancer. There was also moderate left hydronephrosis, likely due to the pelvic mass pressing on your left ureter. We offered a nephrostomy tube placement but family wanted to make a decision after discussion with oncology. In addition, we found changes in your liver consistent with cirrhosis and hypodense areas that required further investigation. Further CT scans with contrast showed a large mass on the left adnexal area, possible secondary neoplasms in your uterus, and thickening in the sigmoid colon, indicating either metastatic or new primary tumors. Masses were also seen in your liver and spleen, suggesting possible metastasis. We did a biopsy to get more definitive information, and those results are still pending.  You've had episodes of fever, but you've been fever-free for the past 24 hours and are now off antibiotics. Your change in mental status might be related to something called paraneoplastic syndrome or a metabolic imbalance rather than a worsening of potential dementia. Routine tests, such as an EEG, show a pattern consistent with these metabolic issues.  As you prepare to go home, it’s crucial that you follow up with your healthcare team, particularly once the biopsy results are available, as they will guide further treatment decisions.. Please follow up with your PCP, GYN surgeon, and an oncologist.      SECONDARY DISCHARGE DIAGNOSES  Diagnosis: Confusion  Assessment and Plan of Treatment:

## 2025-05-02 NOTE — CONSULT NOTE ADULT - CONSULT REASON
Pelvic Mass
JAMIL
"worsening confusion"
new L hydro
Abdomino-Pelvic masses
ID consulted for diagnostic work up and antimicrobial treatment of vertebral OM
GOC

## 2025-05-02 NOTE — DISCHARGE NOTE PROVIDER - CARE PROVIDER_API CALL
Bello Uriostegui  Internal Medicine  [address] [place] [address]  Phone: [phone]  Fax: [phone]  Follow Up Time: 2 weeks    Philly Lebron  Gynecologic Oncology  [address] [facility], [address] [place] [address]  Phone: [phone]  Fax: [phone]  Follow Up Time: 2 weeks    Zenon Molina  Internal Medicine  [address] [place] [address]  Phone: [phone]  Fax: [phone]  Follow Up Time: 1 week   Bello Uriostegui  Internal Medicine  [address] [place] [address]  Phone: [phone]  Fax: [phone]  Follow Up Time: 1 week    Philly Lebron  Gynecologic Oncology  [address] [facility] [place] [address]  Phone: [phone]  Fax: [phone]  Follow Up Time: 1 week    Zenon Molina  Internal Medicine  [address] [place] [address]  Phone: [phone]  Fax: [phone]  Follow Up Time: 2 weeks

## 2025-05-02 NOTE — CHART NOTE - NSCHARTNOTEFT_GEN_A_CORE
CT abdomen/pelvis from 4/27/25 shows an anterior omental mass measuring 5.5 x 6.2 cm, able to be accessed for image guided biopsy. Patient will be scheduled as an add-on for image-guided biopsy of omental mass tomorrow, 4/29/25.   - NPO after midnight   - Draw CBC/CMP/coags prior to procedure   - Hold anticoagulation until after procedure     Please call Interventional Radiology with questions or concerns:   [id number]: [facility]_ir on Teams or Spectra [phone]   All other hours: [phone]     Case discussed with Dr. Moya.
To whom it may concern,      I am writing to document the medical necessity for a rolling walker for our patient, Mrs. Radha Dhaliwal. Mrs. Dhaliwal has been diagnosed with likely metastatic cancer, greatly affecting her mobility and balance.     Mrs. Dhaliwal's metastatic cancer diagnosis and associated treatment have resulted in significant fatigue and muscle weakness, which compromise her ability to ambulate safely. The rolling walker is critical to provide her with the stability and support required to navigate her environment without the risk of falls and related injuries.    The rolling walker will enhance Mrs. Dhaliwal's quality of life by:    Supporting her during ambulation, thereby increasing safety and reducing fall risk;  Facilitating her ability to perform daily activities independently;  Improving her confidence in moving within her home and community;  Allowing her to maintain a sense of autonomy while managing ongoing cancer treatments.  Given these considerations, the rolling walker is medically necessary to ensure Mrs. Dhaliwal's safety and support her independence during her treatment process.
PGY 4 GYN Onc Chart Note    MRI reviewed. Given setting of left hydronephrosis and likely need for chemotherapy, recommend ureteral stent. Will continue to follow up pathology.     Plan discussed with Dr Lebron.
Pt had a fever of a 100.9 which is a new onset,  blood cultures collected and given suspicion of OM, pt started on vancomycin and aztreonam given penicillin allergy. ID c/s placed as well.

## 2025-05-02 NOTE — PROGRESS NOTE ADULT - SUBJECTIVE AND OBJECTIVE BOX
HPI:  75-year-old female past medical history of hypertension, hyperlipidemia brought in by  at bedside who is providing history for concern for UTI.  Patient's been having a slowly decreased decline in her mental status 4 days ago had a home test where she was found to have a UTI and it was positive. Her PCP prescribed nitrofurantoin 210 which patient is already took 4 doses however today noticed low grade fever which is what prompted the visit.  No chest pain, shortness of breath, nausea, vomiting, no flank pain. Patient's  at bedside states that patient has been having a decrease in mental status in the past 1-2 months. He gives an example of her episode of confusion, stating that she would do things like put aluminum foil in the microwave. Patient also endorses decreased appetite for the past 2 months with a ~12 pound weight loss. Hospital workup elicited several imaging findings concerning for metastasis. 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     · BP Systolic	 173 mm Hg  · BP Diastolic	79 mm Hg  · Heart Rate	88 /min  · Respiration Rate (breaths/min)	18 /min  · Temp (F)		99.3 Degrees F  · SpO2 (%)	97 % room air    WBC 8  Hgb 8.1  Albumin 3.3  BUN 71  Cr 1.9  eGFR 27  UA - no bacteria, but protein + WBC +, was already on Abx   (2025 08:56)     MEDICATIONS  Home Medications:  olmesartan 20 mg oral tablet: 1 tab(s) orally once a day (2022 08:43)  rosuvastatin 5 mg oral capsule: 1 cap(s) orally once a day (2022 08:43)    MEDICATIONS  (STANDING):  chlorhexidine 2% Cloths 1 Application(s) Topical <User Schedule>  heparin   Injectable 5000 Unit(s) SubCutaneous every 8 hours  lactated ringers. 1000 milliLiter(s) (75 mL/Hr) IV Continuous <Continuous>  rosuvastatin 5 milliGRAM(s) Oral at bedtime    MEDICATIONS  (PRN):  acetaminophen     Tablet .. 650 milliGRAM(s) Oral every 6 hours PRN Temp greater or equal to 38C (100.4F), Mild Pain (1 - 3)  aluminum hydroxide/magnesium hydroxide/simethicone Suspension 30 milliLiter(s) Oral every 4 hours PRN Dyspepsia  melatonin 3 milliGRAM(s) Oral at bedtime PRN Insomnia  midazolam Injectable 2 milliGRAM(s) IV Push once PRN pre-MRI  ondansetron Injectable 4 milliGRAM(s) IV Push every 8 hours PRN Nausea and/or Vomiting      FAMILY HISTORY:    SOCIAL HISTORY: negative for tobacco, alcohol, or ilicit drug use.    Allergies    penicillin (Unknown)  latex (Rash)    Intolerances        GEN: NAD, pleasant, cooperative    NEURO:   Mental status: Awake, alert not oriented to age, or date. knows she in the hospital.  Unable to follow complex commands. Unable to remember  No dysarthria, no aphasia.   Cranial nerves:   - Eyes: PERRL, EOMI without nystagmus, Visual fields full   - Face: BL V1-V3 sensation intact symmetrically, face symmetric   - ENT: Hearing intact to voice, palate elevation symmetric, tongue was midline  Motor: No drift in all 4 extremities, 5/5 ZACARIAS&LE. Normal tone and bulk.  No abnormal movements.    Sensation: Intact to light touch.  Coordination: No dysmetria on finger-to-nose.  Reflexes: 2+ in bilateral UE/LE, downgoing toes bilaterally. positive glabellar, palmomental (+)  Gait: Deferred.    LABS:                        7.4    7.61  )-----------( 235      ( 2025 06:32 )             23.3         138  |  106  |  43[H]  ----------------------------<  131[H]  4.8   |  20  |  1.8[H]    Ca    8.1[L]      2025 06:32  Mg     2.1         TPro  5.6[L]  /  Alb  2.9[L]  /  TBili  0.5  /  DBili  x   /  AST  69[H]  /  ALT  47[H]  /  AlkPhos  95      Hemoglobin A1C:   Vitamin B12   PT/INR - ( 2025 07:55 )   PT: 12.30 sec;   INR: 1.04 ratio         PTT - ( 2025 07:55 )  PTT:25.1 sec  CAPILLARY BLOOD GLUCOSE          Urinalysis Basic - ( 2025 13:00 )    Color: Yellow / Appearance: Cloudy / S.023 / pH: x  Gluc: x / Ketone: Negative mg/dL  / Bili: Negative / Urobili: 2.0 mg/dL   Blood: x / Protein: 100 mg/dL / Nitrite: Negative   Leuk Esterase: Large / RBC: 1 /HPF /  /HPF   Sq Epi: x / Non Sq Epi: 6 /HPF / Bacteria: Occasional /HPF            Microbiology:      RADIOLOGY, EKG AND ADDITIONAL TESTS: Reviewed.          
INTERVENTIONAL RADIOLOGY BRIEF-OPERATIVE NOTE    Procedure:  Percutaneous, CT-directed core needle biopsy of RUQ omental mass    Pre-Op Diagnosis:  Abdominal/pelvic peritoneal masses; liver mass; splenic mass; colonic mass    Post-Op Diagnosis:  Same    Attending:  Shyann  Resident:   None    Anesthesia (type):  [ ] General Anesthesia  [X] Sedation-- Versed, 1 mg and Fentanyl, 25 mcg, iv  [ ] Spinal Anesthesia  [X] Local/Regional-- 1% Lidocaine, SQ, RUQ, 4 cc    Total Face-to-Face Sedation Time:  31 minutes    Contrast:   None    Estimated Blood Loss:  5 cc    Condition:   [ ] Critical  [ ] Serious  [ ] Fair   [X] Good    Findings/Follow up Plan of Care:  RUQ mass biopsied with 19/20G, 6/10 cm Temno coaxial needle system with CT-guidance:  Multiple (~14) 20G tissue cores obtained and submitted for laboratory evaluation.  Patient toterated very well, without incident.  D/W patient's , Jose.    Specimens Removed:  ~14 20G tissue cores    Implants:  None    Complications:  None immediate    Disposition:  Back to floor; continue present care.      Please call Interventional Radiology [phone] with any questions, concerns, or issues.        
NEPHROLOGY FOLLOW UP NOTE    pt seen and examined  no complaints     PAST MEDICAL & SURGICAL HISTORY:  HTN (hypertension)      HLD (hyperlipidemia)      Cataract      S/P liudmila      H/O lumpectomy  left      History of dilation and curettage      Status post cataract extraction and insertion of intraocular lens, left        Allergies:  penicillin (Unknown)  latex (Rash)    Home Medications Reviewed    SOCIAL HISTORY:  Denies ETOH,Smoking,   FAMILY HISTORY:        REVIEW OF SYSTEMS:  All other review of systems is negative unless indicated above.    PHYSICAL EXAM:  Constitutional: NAD, thin  HEENT: anicteric sclera, oropharynx clear, MMM  Neck: No JVD  Respiratory: CTAB, no wheezes, rales or rhonchi  Cardiovascular: S1, S2, RRR  Gastrointestinal: BS+, soft, NT/ND  Extremities: No cyanosis or clubbing. No peripheral edema  Neurological: confused   Psychiatric: Normal mood, normal affect  : No CVA tenderness. No jo.   Skin: No rashes    Hospital Medications:   MEDICATIONS  (STANDING):  chlorhexidine 2% Cloths 1 Application(s) Topical <User Schedule>  lactated ringers. 1000 milliLiter(s) (75 mL/Hr) IV Continuous <Continuous>  rosuvastatin 5 milliGRAM(s) Oral at bedtime        VITALS:  T(F): 97.8 (04-29-25 @ 14:19), Max: 98.8 (04-29-25 @ 08:00)  HR: 71 (04-29-25 @ 14:19)  BP: 137/75 (04-29-25 @ 14:19)  RR: 19 (04-29-25 @ 14:19)  SpO2: 96% (04-29-25 @ 14:19)  Wt(kg): --    Height (cm): 154.9 (04-29 @ 08:00)  Weight (kg): 74.8 (04-29 @ 08:00)  BMI (kg/m2): 31.2 (04-29 @ 08:00)  BSA (m2): 1.74 (04-29 @ 08:00)    LABS:  04-29    139  |  105  |  42[H]  ----------------------------<  91  4.6   |  36[H]  |  1.8[H]    Ca    8.4      29 Apr 2025 07:55  Mg     2.7     04-29    TPro  5.5[L]  /  Alb  2.6[L]  /  TBili  0.3  /  DBili      /  AST  71[H]  /  ALT  51[H]  /  AlkPhos  96  04-29                          7.4    5.97  )-----------( 225      ( 29 Apr 2025 07:55 )             23.0       Urine Studies:  Urinalysis Basic - ( 29 Apr 2025 07:55 )    Color:  / Appearance:  / SG:  / pH:   Gluc: 91 mg/dL / Ketone:   / Bili:  / Urobili:    Blood:  / Protein:  / Nitrite:    Leuk Esterase:  / RBC:  / WBC    Sq Epi:  / Non Sq Epi:  / Bacteria:       Creatinine, Random Urine: 55 mg/dL (04-25 @ 05:01)  Protein/Creatinine Ratio Calculation: 0.7 Ratio (04-25 @ 05:01)  Sodium, Random Urine: 126.0 mmoL/L (04-25 @ 05:01)  Osmolality, Random Urine: 629 mos/kg (04-25 @ 05:01)      RADIOLOGY & ADDITIONAL STUDIES:           
Patient is a 75y old  Female who presents with a chief complaint of UTI (25 Apr 2025 13:01)    INTERVAL HPI/OVERNIGHT EVENTS: Patient was examined and seen at bedside. This afternoon pt is resting comfortably in bed and reports no new issues or overnight events. Had fever again last night. Denies dysuria, cough, URI Sx, AP. No complaints, feels well. MILTON at bedside.  ROS: Denies CP, SOB, AP, new weakness  All other systems reviewed and are within normal limits.  InitialHPI:  75-year-old female past medical history of hyper tension hyperlipidemia brought in by  at bedside who is providing history for concern for UTI.  Patient's been having a slowly decreased decline in her mental status 4 days ago had a home test where she was found to have a UTI and it was positive. Her PCP prescribed nitrofurantoin 210 which patient is already took 4 doses however today noticed low grade fever which is what prompted the visit.  No chest pain, shortness of breath, nausea, vomiting, no flank pain.     (24 Apr 2025 08:56)    PAST MEDICAL & SURGICAL HISTORY:  HTN (hypertension)      HLD (hyperlipidemia)      Cataract      S/P liudmila      H/O lumpectomy  left      History of dilation and curettage      Status post cataract extraction and insertion of intraocular lens, left          General: NAD, AAOx1  HEENT:  EOMI, no LAD  CV: S1 S2  Resp: decreased breath sounds at bases  GI: NT/ND/S +BS  MS: no clubbing/cyanosis/edema, + pulses b/l  Neuro: nonfocal, +reflexes thruout            Home Medications:  olmesartan 20 mg oral tablet: 1 tab(s) orally once a day (17 Nov 2022 08:43)  rosuvastatin 5 mg oral capsule: 1 cap(s) orally once a day (17 Nov 2022 08:43)    MEDICATIONS  (STANDING):  chlorhexidine 2% Cloths 1 Application(s) Topical <User Schedule>  heparin   Injectable 5000 Unit(s) SubCutaneous every 8 hours  lactated ringers. 1000 milliLiter(s) (75 mL/Hr) IV Continuous <Continuous>  rosuvastatin 5 milliGRAM(s) Oral at bedtime    MEDICATIONS  (PRN):  acetaminophen     Tablet .. 650 milliGRAM(s) Oral every 6 hours PRN Temp greater or equal to 38C (100.4F), Mild Pain (1 - 3)  aluminum hydroxide/magnesium hydroxide/simethicone Suspension 30 milliLiter(s) Oral every 4 hours PRN Dyspepsia  melatonin 3 milliGRAM(s) Oral at bedtime PRN Insomnia  midazolam Injectable 2 milliGRAM(s) IV Push once PRN pre-MRI  ondansetron Injectable 4 milliGRAM(s) IV Push every 8 hours PRN Nausea and/or Vomiting    Vital Signs Last 24 Hrs  T(C): 36.3 (01 May 2025 12:40), Max: 38.3 (30 Apr 2025 19:27)  T(F): 97.4 (01 May 2025 12:40), Max: 101 (30 Apr 2025 19:27)  HR: 60 (01 May 2025 12:40) (60 - 72)  BP: 130/67 (01 May 2025 12:40) (126/64 - 155/72)  BP(mean): 88 (01 May 2025 12:40) (88 - 100)  RR: 18 (01 May 2025 12:40) (16 - 18)  SpO2: 98% (01 May 2025 12:40) (96% - 98%)    Parameters below as of 01 May 2025 12:40  Patient On (Oxygen Delivery Method): room air      CAPILLARY BLOOD GLUCOSE        LABS:                        8.0    8.76  )-----------( 263      ( 01 May 2025 05:23 )             25.6     05-01    141  |  108  |  41[H]  ----------------------------<  94  4.6   |  21  |  1.9[H]    Ca    8.5      01 May 2025 05:23  Mg     2.3     05-01    TPro  6.1  /  Alb  2.8[L]  /  TBili  0.4  /  DBili  x   /  AST  56[H]  /  ALT  38  /  AlkPhos  105  05-01    LIVER FUNCTIONS - ( 01 May 2025 05:23 )  Alb: 2.8 g/dL / Pro: 6.1 g/dL / ALK PHOS: 105 U/L / ALT: 38 U/L / AST: 56 U/L / GGT: x                 Urinalysis Basic - ( 01 May 2025 05:23 )    Color: x / Appearance: x / SG: x / pH: x  Gluc: 94 mg/dL / Ketone: x  / Bili: x / Urobili: x   Blood: x / Protein: x / Nitrite: x   Leuk Esterase: x / RBC: x / WBC x   Sq Epi: x / Non Sq Epi: x / Bacteria: x              Culture - Blood (collected 29 Apr 2025 20:17)  Source: Blood Blood  Preliminary Report (01 May 2025 03:02):    No growth at 24 hours      Consultant Notes Reviewed:  [x ] YES  [ ] NO  Care Discussed with Consultants/Other Providers/ Housestaff [ x] YES  [ ] NO  Radiology, labs, EKGs, new studies personally reviewed.                                                                                    
SUBJECTIVE/OVERNIGHT EVENTS  Today is hospital day 7d. This morning patient was seen and examined at bedside      MEDICATIONS  STANDING MEDICATIONS  aztreonam  IVPB 1000 milliGRAM(s) IV Intermittent every 12 hours  chlorhexidine 2% Cloths 1 Application(s) Topical <User Schedule>  lactated ringers. 1000 milliLiter(s) IV Continuous <Continuous>  rosuvastatin 5 milliGRAM(s) Oral at bedtime    PRN MEDICATIONS  acetaminophen     Tablet .. 650 milliGRAM(s) Oral every 6 hours PRN  aluminum hydroxide/magnesium hydroxide/simethicone Suspension 30 milliLiter(s) Oral every 4 hours PRN  melatonin 3 milliGRAM(s) Oral at bedtime PRN  midazolam Injectable 2 milliGRAM(s) IV Push once PRN  ondansetron Injectable 4 milliGRAM(s) IV Push every 8 hours PRN    VITALS  T(F): 99.3 (04-30-25 @ 08:00), Max: 102 (04-30-25 @ 04:30)  HR: 72 (04-30-25 @ 08:00) (68 - 75)  BP: 128/67 (04-30-25 @ 08:00) (116/65 - 155/66)  RR: 18 (04-30-25 @ 08:00) (18 - 19)  SpO2: 98% (04-30-25 @ 08:00) (94% - 100%)    PHYSICAL EXAM  GENERAL  ( x ) NAD, lying in bed comfortably     (  ) obtunded     (  ) lethargic     (  ) somnolent    HEAD  (  ) Atraumatic     (  ) hematoma     (  ) laceration (specify location:       )     NECK  (  ) Supple     (  ) neck stiffness     (  ) nuchal rigidity     (  )  no JVD     (  ) JVD present ( -- cm)    HEART  Rate -->  (  ) normal rate    (  ) bradycardic    (  ) tachycardic  Rhythm -->  (  ) regular    (  ) regularly irregular    (  ) irregularly irregular  Murmurs -->  (  ) normal s1/s2    (  ) systolic murmur    (  ) diastolic murmur    (  ) continuous murmur     (  ) S3 present    (  ) S4 present    LUNGS  ( x )Unlabored respirations     (  ) tachypnea  ( x ) B/L air entry     (  ) decreased breath sounds in:  (location     )    (  ) no adventitious sound     (  ) crackles     (  ) wheezing      (  ) rhonchi      (specify location:       )  (  ) chest wall tenderness (specify location:       )    ABDOMEN  ( x ) Soft     (  ) tense   |   (  ) nondistended     (  ) distended   |   (  ) +BS     (  ) hypoactive bowel sounds     (  ) hyperactive bowel sounds  (  ) nontender     (  ) RUQ tenderness     (  ) RLQ tenderness     (  ) LLQ tenderness     (  ) epigastric tenderness     (  ) diffuse tenderness  (  ) Splenomegaly      (  ) Hepatomegaly      (  ) Jaundice     (  ) ecchymosis     EXTREMITIES  ( x ) Normal     (  ) Rash     (  ) ecchymosis     (  ) varicose veins      (  ) pitting edema     (  ) non-pitting edema   (  ) ulceration     (  ) gangrene:     (location:     )    LABS             7.4    7.61  )-----------( 235      ( 04-30-25 @ 06:32 )             23.3     138  |  106  |  43  -------------------------<  131   04-30-25 @ 06:32  4.8  |  20  |  1.8    Ca      8.1     04-30-25 @ 06:32  Mg     2.1     04-30-25 @ 06:32    TPro  5.6  /  Alb  2.9  /  TBili  0.5  /  DBili  x   /  AST  69  /  ALT  47  /  AlkPhos  95  /  GGT  x     04-30-25 @ 06:32    PT/INR - ( 04-29-25 @ 07:55 )   PT: 12.30 sec;   INR: 1.04 ratio  PTT - ( 04-29-25 @ 07:55 )  PTT:25.1 sec      Urinalysis Basic - ( 30 Apr 2025 06:32 )    Color: x / Appearance: x / SG: x / pH: x  Gluc: 131 mg/dL / Ketone: x  / Bili: x / Urobili: x   Blood: x / Protein: x / Nitrite: x   Leuk Esterase: x / RBC: x / WBC x   Sq Epi: x / Non Sq Epi: x / Bacteria: x          IMAGING
Spoke to patient and her spouse, new information since 4/26/25:    Tumor markers showed elevated ca125, normal other markers.  On CT w cntrast there are additional findings of hepatic and splenic mets in addition to the lesions seen in the pelvis and L adnexa. There is additionally thickening of the sigmoid colon.    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    She is scheduled for IR Bx tomorrow, and depending on the results would then recommend treatment.  All questions answered, concerns addressed.    25 min spent in [facility].
INTERVAL HPI/OVERNIGHT EVENTS:  Patient was seen and examined at bedside. As per nurse and patient, no o/n events, patient resting comfortably. No complaints at this time. Patient denies: fever, chills, dizziness, weakness, HA, Changes in vision, CP, palpitations, SOB, cough, N/V/D/C, dysuria, changes in bowel movements, LE edema. ROS otherwise negative.    VITAL SIGNS:  T(F): 98.6 (04-26-25 @ 04:52)  HR: 79 (04-26-25 @ 04:52)  BP: 141/57 (04-26-25 @ 04:52)  RR: 18 (04-26-25 @ 04:52)  SpO2: 98% (04-26-25 @ 04:52)  Wt(kg): --    PHYSICAL EXAM:    Constitutional: WDWN, NAD  HEENT: PERRL, EOMI, sclera non-icteric, neck supple, trachea midline, no masses, no JVD, MMM, good dentition  Respiratory: CTA b/l, good air entry b/l, no wheezing, no rhonchi, no rales, without accessory muscle use and no intercostal retractions  Cardiovascular: RRR, normal S1S2, no M/R/G  Gastrointestinal: soft, NTND, no masses palpable, BS normal  Extremities: Warm, well perfused, pulses equal bilateral upper and lower extremities, no edema, no clubbing  Neurological: AAOx3, CN Grossly intact  Skin: Normal temperature, warm, dry    MEDICATIONS  (STANDING):  aztreonam  IVPB 1000 milliGRAM(s) IV Intermittent every 12 hours  chlorhexidine 2% Cloths 1 Application(s) Topical <User Schedule>  heparin   Injectable 5000 Unit(s) SubCutaneous every 12 hours  rosuvastatin 5 milliGRAM(s) Oral at bedtime  sodium bicarbonate 650 milliGRAM(s) Oral every 8 hours  sodium chloride 0.9%. 1000 milliLiter(s) (75 mL/Hr) IV Continuous <Continuous>    MEDICATIONS  (PRN):  acetaminophen     Tablet .. 650 milliGRAM(s) Oral every 6 hours PRN Temp greater or equal to 38C (100.4F), Mild Pain (1 - 3)  aluminum hydroxide/magnesium hydroxide/simethicone Suspension 30 milliLiter(s) Oral every 4 hours PRN Dyspepsia  melatonin 3 milliGRAM(s) Oral at bedtime PRN Insomnia  ondansetron Injectable 4 milliGRAM(s) IV Push every 8 hours PRN Nausea and/or Vomiting      Allergies    penicillin (Unknown)  latex (Rash)    Intolerances        LABS:                        8.1    7.18  )-----------( 244      ( 26 Apr 2025 07:23 )             25.0     04-26    139  |  109  |  44[H]  ----------------------------<  87  5.2[H]   |  19  |  1.5    Ca    8.8      26 Apr 2025 07:23  Phos  3.1     04-25  Mg     1.8     04-25    TPro  6.1  /  Alb  2.9[L]  /  TBili  0.5  /  DBili  x   /  AST  90[H]  /  ALT  67[H]  /  AlkPhos  94  04-26      Urinalysis Basic - ( 26 Apr 2025 07:23 )    Color: x / Appearance: x / SG: x / pH: x  Gluc: 87 mg/dL / Ketone: x  / Bili: x / Urobili: x   Blood: x / Protein: x / Nitrite: x   Leuk Esterase: x / RBC: x / WBC x   Sq Epi: x / Non Sq Epi: x / Bacteria: x        RADIOLOGY & ADDITIONAL TESTS:  Reviewed
NEPHROLOGY FOLLOW UP NOTE    pt seen and examined  no complaints   + fever  no flank pain    PAST MEDICAL & SURGICAL HISTORY:  HTN (hypertension)      HLD (hyperlipidemia)      Cataract      S/P liudmila      H/O lumpectomy  left      History of dilation and curettage      Status post cataract extraction and insertion of intraocular lens, left        Allergies:  penicillin (Unknown)  latex (Rash)    Home Medications Reviewed    SOCIAL HISTORY:  Denies ETOH,Smoking,   FAMILY HISTORY:        REVIEW OF SYSTEMS:  All other review of systems is negative unless indicated above.    PHYSICAL EXAM:  Constitutional: NAD, thin  HEENT: anicteric sclera, oropharynx clear, MMM  Neck: No JVD  Respiratory: CTAB, no wheezes, rales or rhonchi  Cardiovascular: S1, S2, RRR  Gastrointestinal: BS+, soft, NT/ND  Extremities: No cyanosis or clubbing. No peripheral edema  Neurological: confused   Psychiatric: Normal mood, normal affect  : No CVA tenderness. No jo.   Skin: No rashes    Hospital Medications:   MEDICATIONS  (STANDING):  chlorhexidine 2% Cloths 1 Application(s) Topical <User Schedule>  heparin   Injectable 5000 Unit(s) SubCutaneous every 8 hours  lactated ringers. 1000 milliLiter(s) (75 mL/Hr) IV Continuous <Continuous>  rosuvastatin 5 milliGRAM(s) Oral at bedtime        VITALS:  T(F): 97.4 (05-01-25 @ 12:40), Max: 101 (04-30-25 @ 19:27)  HR: 60 (05-01-25 @ 12:40)  BP: 130/67 (05-01-25 @ 12:40)  RR: 18 (05-01-25 @ 12:40)  SpO2: 98% (05-01-25 @ 12:40)  Wt(kg): --    04-30 @ 07:01  -  05-01 @ 07:00  --------------------------------------------------------  IN: 0 mL / OUT: 250 mL / NET: -250 mL          LABS:  05-01    141  |  108  |  41[H]  ----------------------------<  94  4.6   |  21  |  1.9[H]    Ca    8.5      01 May 2025 05:23  Mg     2.3     05-01    TPro  6.1  /  Alb  2.8[L]  /  TBili  0.4  /  DBili      /  AST  56[H]  /  ALT  38  /  AlkPhos  105  05-01                          8.0    8.76  )-----------( 263      ( 01 May 2025 05:23 )             25.6       Urine Studies:  Urinalysis Basic - ( 01 May 2025 05:23 )    Color:  / Appearance:  / SG:  / pH:   Gluc: 94 mg/dL / Ketone:   / Bili:  / Urobili:    Blood:  / Protein:  / Nitrite:    Leuk Esterase:  / RBC:  / WBC    Sq Epi:  / Non Sq Epi:  / Bacteria:       Creatinine, Random Urine: 55 mg/dL (04-25 @ 05:01)  Protein/Creatinine Ratio Calculation: 0.7 Ratio (04-25 @ 05:01)  Sodium, Random Urine: 126.0 mmoL/L (04-25 @ 05:01)  Osmolality, Random Urine: 629 mos/kg (04-25 @ 05:01)      RADIOLOGY & ADDITIONAL STUDIES:         
Patient is a 75y old  Female who presents with a chief complaint of UTI (25 Apr 2025 13:01)    INTERVAL HPI/OVERNIGHT EVENTS: Patient was examined and seen at bedside. This afternoon pt is resting comfortably in bed and reports no new issues or overnight events. S/p RUQ mass Bx. No complaints, feels well.  at bedside.  ROS: Denies CP, SOB, AP, new weakness  All other systems reviewed and are within normal limits.  InitialHPI:  75-year-old female past medical history of hyper tension hyperlipidemia brought in by  at bedside who is providing history for concern for UTI.  Patient's been having a slowly decreased decline in her mental status 4 days ago had a home test where she was found to have a UTI and it was positive. Her PCP prescribed nitrofurantoin 210 which patient is already took 4 doses however today noticed low grade fever which is what prompted the visit.  No chest pain, shortness of breath, nausea, vomiting, no flank pain.     (24 Apr 2025 08:56)    PAST MEDICAL & SURGICAL HISTORY:  HTN (hypertension)      HLD (hyperlipidemia)      Cataract      S/P liudmila      H/O lumpectomy  left      History of dilation and curettage      Status post cataract extraction and insertion of intraocular lens, left          General: NAD, AAOx1  HEENT:  EOMI, no LAD  CV: S1 S2  Resp: decreased breath sounds at bases  GI: NT/ND/S +BS  MS: no clubbing/cyanosis/edema, + pulses b/l  Neuro: nonfocal, +reflexes thruout            Home Medications:  olmesartan 20 mg oral tablet: 1 tab(s) orally once a day (17 Nov 2022 08:43)  rosuvastatin 5 mg oral capsule: 1 cap(s) orally once a day (17 Nov 2022 08:43)    MEDICATIONS  (STANDING):  chlorhexidine 2% Cloths 1 Application(s) Topical <User Schedule>  lactated ringers. 1000 milliLiter(s) (75 mL/Hr) IV Continuous <Continuous>  rosuvastatin 5 milliGRAM(s) Oral at bedtime    MEDICATIONS  (PRN):  acetaminophen     Tablet .. 650 milliGRAM(s) Oral every 6 hours PRN Temp greater or equal to 38C (100.4F), Mild Pain (1 - 3)  aluminum hydroxide/magnesium hydroxide/simethicone Suspension 30 milliLiter(s) Oral every 4 hours PRN Dyspepsia  melatonin 3 milliGRAM(s) Oral at bedtime PRN Insomnia  midazolam Injectable 2 milliGRAM(s) IV Push once PRN pre-MRI  ondansetron Injectable 4 milliGRAM(s) IV Push every 8 hours PRN Nausea and/or Vomiting    Vital Signs Last 24 Hrs  T(C): 36.6 (29 Apr 2025 14:19), Max: 37.1 (29 Apr 2025 08:00)  T(F): 97.8 (29 Apr 2025 14:19), Max: 98.8 (29 Apr 2025 08:00)  HR: 71 (29 Apr 2025 14:19) (61 - 73)  BP: 137/75 (29 Apr 2025 14:19) (129/54 - 155/66)  BP(mean): 96 (29 Apr 2025 14:19) (79 - 102)  RR: 19 (29 Apr 2025 14:19) (18 - 19)  SpO2: 96% (29 Apr 2025 14:19) (96% - 97%)    Parameters below as of 29 Apr 2025 14:19  Patient On (Oxygen Delivery Method): room air      CAPILLARY BLOOD GLUCOSE        LABS:                        7.4    5.97  )-----------( 225      ( 29 Apr 2025 07:55 )             23.0     04-29    139  |  105  |  42[H]  ----------------------------<  91  4.6   |  36[H]  |  1.8[H]    Ca    8.4      29 Apr 2025 07:55  Mg     2.7     04-29    TPro  5.5[L]  /  Alb  2.6[L]  /  TBili  0.3  /  DBili  x   /  AST  71[H]  /  ALT  51[H]  /  AlkPhos  96  04-29    LIVER FUNCTIONS - ( 29 Apr 2025 07:55 )  Alb: 2.6 g/dL / Pro: 5.5 g/dL / ALK PHOS: 96 U/L / ALT: 51 U/L / AST: 71 U/L / GGT: x               PT/INR - ( 29 Apr 2025 07:55 )   PT: 12.30 sec;   INR: 1.04 ratio         PTT - ( 29 Apr 2025 07:55 )  PTT:25.1 sec  Urinalysis Basic - ( 29 Apr 2025 07:55 )    Color: x / Appearance: x / SG: x / pH: x  Gluc: 91 mg/dL / Ketone: x  / Bili: x / Urobili: x   Blood: x / Protein: x / Nitrite: x   Leuk Esterase: x / RBC: x / WBC x   Sq Epi: x / Non Sq Epi: x / Bacteria: x              Consultant Notes Reviewed:  [x ] YES  [ ] NO  Care Discussed with Consultants/Other Providers/ Housestaff [ x] YES  [ ] NO  Radiology, labs, EKGs, new studies personally reviewed.                                                                                  
Patient is a 75y old  Female who presents with a chief complaint of UTI (25 Apr 2025 13:01)    INTERVAL HPI/OVERNIGHT EVENTS: Patient was examined and seen at bedside. This morning pt is resting comfortably in bed and reports no new issues or overnight events. No complaints, feels well.  at bedside.  ROS: Denies CP, SOB, AP, new weakness  All other systems reviewed and are within normal limits.  InitialHPI:  75-year-old female past medical history of hyper tension hyperlipidemia brought in by  at bedside who is providing history for concern for UTI.  Patient's been having a slowly decreased decline in her mental status 4 days ago had a home test where she was found to have a UTI and it was positive. Her PCP prescribed nitrofurantoin 210 which patient is already took 4 doses however today noticed low grade fever which is what prompted the visit.  No chest pain, shortness of breath, nausea, vomiting, no flank pain.     (24 Apr 2025 08:56)    PAST MEDICAL & SURGICAL HISTORY:  HTN (hypertension)      HLD (hyperlipidemia)      Cataract      S/P liudmila      H/O lumpectomy  left      History of dilation and curettage      Status post cataract extraction and insertion of intraocular lens, left          General: NAD, AAOx1+  HEENT:  EOMI, no LAD  CV: S1 S2  Resp: decreased breath sounds at bases  GI: NT/ND/S +BS  MS: no clubbing/cyanosis/edema, + pulses b/l  Neuro: nonfocal, +reflexes thruout    MEDICATIONS  (STANDING):  aztreonam  IVPB 1000 milliGRAM(s) IV Intermittent every 12 hours  chlorhexidine 2% Cloths 1 Application(s) Topical <User Schedule>  heparin   Injectable 5000 Unit(s) SubCutaneous every 12 hours  rosuvastatin 5 milliGRAM(s) Oral at bedtime  sodium bicarbonate 650 milliGRAM(s) Oral every 8 hours  sodium chloride 0.9%. 1000 milliLiter(s) (75 mL/Hr) IV Continuous <Continuous>    MEDICATIONS  (PRN):  acetaminophen     Tablet .. 650 milliGRAM(s) Oral every 6 hours PRN Temp greater or equal to 38C (100.4F), Mild Pain (1 - 3)  aluminum hydroxide/magnesium hydroxide/simethicone Suspension 30 milliLiter(s) Oral every 4 hours PRN Dyspepsia  melatonin 3 milliGRAM(s) Oral at bedtime PRN Insomnia  midazolam Injectable 2 milliGRAM(s) IV Push once PRN pre-MRI  ondansetron Injectable 4 milliGRAM(s) IV Push every 8 hours PRN Nausea and/or Vomiting    Vital Signs Last 24 Hrs  T(C): 36.7 (25 Apr 2025 12:51), Max: 37.6 (24 Apr 2025 19:42)  T(F): 98.1 (25 Apr 2025 12:51), Max: 99.6 (24 Apr 2025 19:42)  HR: 72 (25 Apr 2025 12:51) (72 - 82)  BP: 157/68 (25 Apr 2025 12:51) (137/85 - 157/68)  BP(mean): 103 (25 Apr 2025 04:30) (103 - 103)  RR: 18 (25 Apr 2025 12:51) (18 - 18)  SpO2: 99% (25 Apr 2025 12:51) (97% - 99%)    Parameters below as of 25 Apr 2025 12:51  Patient On (Oxygen Delivery Method): room air      CAPILLARY BLOOD GLUCOSE                              8.4    8.05  )-----------( 247      ( 25 Apr 2025 08:27 )             26.1     04-25    139  |  109  |  50[H]  ----------------------------<  90  5.4[H]   |  20  |  1.5    Ca    9.0      25 Apr 2025 08:27  Phos  3.1     04-25  Mg     1.8     04-25    TPro  5.9[L]  /  Alb  3.1[L]  /  TBili  <0.2  /  DBili  x   /  AST  81[H]  /  ALT  63[H]  /  AlkPhos  95  04-24    LIVER FUNCTIONS - ( 24 Apr 2025 06:32 )  Alb: 3.1 g/dL / Pro: 5.9 g/dL / ALK PHOS: 95 U/L / ALT: 63 U/L / AST: 81 U/L / GGT: x                 Urinalysis Basic - ( 25 Apr 2025 08:27 )    Color: x / Appearance: x / SG: x / pH: x  Gluc: 90 mg/dL / Ketone: x  / Bili: x / Urobili: x   Blood: x / Protein: x / Nitrite: x   Leuk Esterase: x / RBC: x / WBC x   Sq Epi: x / Non Sq Epi: x / Bacteria: x              Culture - Blood (collected 23 Apr 2025 23:59)  Source: Blood Blood-Peripheral  Preliminary Report (25 Apr 2025 09:02):    No growth at 24 hours    Culture - Blood (collected 23 Apr 2025 23:59)  Source: Blood Blood-Peripheral  Preliminary Report (25 Apr 2025 09:02):    No growth at 24 hours    Culture - Urine (collected 23 Apr 2025 21:04)  Source: Catheterized Catheterized  Final Report (25 Apr 2025 09:37):    <10,000 CFU/mL Normal Urogenital Willa      Chart, Consultant(s) Notes Reviewed:  [x ] YES  [ ] NO  Care Discussed with Consultants/Other Providers/ Housestaff [ x] YES  [ ] NO  Radiology, labs, old available records personally reviewed.                        
SUBJECTIVE/OVERNIGHT EVENTS  Today is hospital day 5d. This morning patient was seen and examined at bedside, resting comfortably in bed. No acute or major events overnight.    MEDICATIONS  STANDING MEDICATIONS  chlorhexidine 2% Cloths 1 Application(s) Topical <User Schedule>  heparin   Injectable 5000 Unit(s) SubCutaneous once  lactated ringers. 1000 milliLiter(s) IV Continuous <Continuous>  magnesium sulfate  IVPB 2 Gram(s) IV Intermittent once  rosuvastatin 5 milliGRAM(s) Oral at bedtime  sodium bicarbonate 650 milliGRAM(s) Oral every 8 hours    PRN MEDICATIONS  acetaminophen     Tablet .. 650 milliGRAM(s) Oral every 6 hours PRN  aluminum hydroxide/magnesium hydroxide/simethicone Suspension 30 milliLiter(s) Oral every 4 hours PRN  melatonin 3 milliGRAM(s) Oral at bedtime PRN  ondansetron Injectable 4 milliGRAM(s) IV Push every 8 hours PRN    VITALS  T(F): 98.3 (04-28-25 @ 04:54), Max: 98.3 (04-28-25 @ 04:54)  HR: 72 (04-28-25 @ 04:54) (72 - 76)  BP: 154/83 (04-28-25 @ 04:54) (141/75 - 154/83)  RR: 18 (04-28-25 @ 04:54) (18 - 18)  SpO2: 98% (04-28-25 @ 04:54) (96% - 98%)    PHYSICAL EXAM  GENERAL  (x  ) NAD, lying in bed comfortably     (  ) obtunded     (  ) lethargic     (  ) somnolent    HEAD  (  ) Atraumatic     (  ) hematoma     (  ) laceration (specify location:       )     NECK  (  ) Supple     (  ) neck stiffness     (  ) nuchal rigidity     (  )  no JVD     (  ) JVD present ( -- cm)    HEART  Rate -->  ( x ) normal rate    (  ) bradycardic    (  ) tachycardic  Rhythm -->  ( x ) regular    (  ) regularly irregular    (  ) irregularly irregular  Murmurs -->  (  ) normal s1/s2    (  ) systolic murmur    (  ) diastolic murmur    (  ) continuous murmur     (  ) S3 present    (  ) S4 present    LUNGS  (  )Unlabored respirations     (  ) tachypnea  ( x) B/L air entry     (  ) decreased breath sounds in:  (location     )    (  ) no adventitious sound     (  ) crackles     (  ) wheezing      (  ) rhonchi      (specify location:       )  (  ) chest wall tenderness (specify location:       )    ABDOMEN  (x  ) Soft     (  ) tense   |   (  ) nondistended     (  ) distended   |   (  ) +BS     (  ) hypoactive bowel sounds     (  ) hyperactive bowel sounds  (  ) nontender     (  ) RUQ tenderness     (  ) RLQ tenderness     (  ) LLQ tenderness     (  ) epigastric tenderness     (  ) diffuse tenderness  (  ) Splenomegaly      (  ) Hepatomegaly      (  ) Jaundice     (  ) ecchymosis     EXTREMITIES  ( x) Normal     (  ) Rash     (  ) ecchymosis     (  ) varicose veins      (  ) pitting edema     (  ) non-pitting edema   (  ) ulceration     (  ) gangrene:     (location:     )  LABS             7.7    6.56  )-----------( 215      ( 04-28-25 @ 05:22 )             24.4     137  |  104  |  40  -------------------------<  83   04-28-25 @ 05:22  4.7  |  21  |  1.6    Ca      8.5     04-28-25 @ 05:22  Mg     1.7     04-28-25 @ 05:22    TPro  5.7  /  Alb  2.7  /  TBili  0.5  /  DBili  x   /  AST  83  /  ALT  65  /  AlkPhos  93  /  GGT  x     04-28-25 @ 05:22        Urinalysis Basic - ( 28 Apr 2025 05:22 )    Color: x / Appearance: x / SG: x / pH: x  Gluc: 83 mg/dL / Ketone: x  / Bili: x / Urobili: x   Blood: x / Protein: x / Nitrite: x   Leuk Esterase: x / RBC: x / WBC x   Sq Epi: x / Non Sq Epi: x / Bacteria: x          IMAGING
SUBJECTIVE:    Patient is a 75y old Female who presents with a chief complaint of UTI (26 Apr 2025 10:35)    Currently admitted to medicine with the primary diagnosis of Acute UTI       Today is hospital day 4d. This morning she is resting comfortably in bed and reports no new issues or overnight events.     PAST MEDICAL & SURGICAL HISTORY  HTN (hypertension)    HLD (hyperlipidemia)    Cataract    S/P liudmila    H/O lumpectomy  left    History of dilation and curettage    Status post cataract extraction and insertion of intraocular lens, left      SOCIAL HISTORY:  Negative for smoking/alcohol/drug use.     ALLERGIES:  penicillin (Unknown)  latex (Rash)    MEDICATIONS:  STANDING MEDICATIONS  chlorhexidine 2% Cloths 1 Application(s) Topical <User Schedule>  heparin   Injectable 5000 Unit(s) SubCutaneous every 12 hours  iohexol 300 mG (iodine)/mL Oral Solution 30 milliLiter(s) Oral once  lactated ringers. 1000 milliLiter(s) IV Continuous <Continuous>  rosuvastatin 5 milliGRAM(s) Oral at bedtime  sodium bicarbonate 650 milliGRAM(s) Oral every 8 hours    PRN MEDICATIONS  acetaminophen     Tablet .. 650 milliGRAM(s) Oral every 6 hours PRN  aluminum hydroxide/magnesium hydroxide/simethicone Suspension 30 milliLiter(s) Oral every 4 hours PRN  melatonin 3 milliGRAM(s) Oral at bedtime PRN  ondansetron Injectable 4 milliGRAM(s) IV Push every 8 hours PRN    VITALS:   T(F): 98.4  HR: 71  BP: 146/70  RR: 20  SpO2: 97%    LABS:                        8.0    6.89  )-----------( 254      ( 27 Apr 2025 04:55 )             25.4     04-27    139  |  109  |  44[H]  ----------------------------<  94  4.8   |  19  |  1.6[H]    Ca    8.4      27 Apr 2025 04:55    TPro  5.8[L]  /  Alb  2.8[L]  /  TBili  0.4  /  DBili  x   /  AST  102[H]  /  ALT  77[H]  /  AlkPhos  94  04-27      Urinalysis Basic - ( 27 Apr 2025 04:55 )    Color: x / Appearance: x / SG: x / pH: x  Gluc: 94 mg/dL / Ketone: x  / Bili: x / Urobili: x   Blood: x / Protein: x / Nitrite: x   Leuk Esterase: x / RBC: x / WBC x   Sq Epi: x / Non Sq Epi: x / Bacteria: x                RADIOLOGY:    < from: US Transvaginal (04.26.25 @ 10:17) >    IMPRESSION:  Complex pelvic masses with internal vascularity at least one of which   appears related to the left ovary and the other one may be right ovarian   or uterine in origin difficult to definitively delineate on sonogram.        --- End of Report ---    < end of copied text >      PHYSICAL EXAM:  GEN: No acute distress  LUNGS: Clear to auscultation bilaterally   HEART: S1/S2 present. RRR.   ABD: Soft, non-tender, non-distended. Bowel sounds present  EXT: NC/NC/NE/2+PP/ARNOLD  NEURO: AAOX3    
NEPHROLOGY FOLLOW UP NOTE    pt seen and examined  no complaints     PAST MEDICAL & SURGICAL HISTORY:  HTN (hypertension)      HLD (hyperlipidemia)      Cataract      S/P liudmila      H/O lumpectomy  left      History of dilation and curettage      Status post cataract extraction and insertion of intraocular lens, left        Allergies:  penicillin (Unknown)  latex (Rash)    Home Medications Reviewed    SOCIAL HISTORY:  Denies ETOH,Smoking,   FAMILY HISTORY:        REVIEW OF SYSTEMS:  All other review of systems is negative unless indicated above.    PHYSICAL EXAM:  Constitutional: NAD, thin  HEENT: anicteric sclera, oropharynx clear, MMM  Neck: No JVD  Respiratory: CTAB, no wheezes, rales or rhonchi  Cardiovascular: S1, S2, RRR  Gastrointestinal: BS+, soft, NT/ND  Extremities: No cyanosis or clubbing. No peripheral edema  Neurological: confused   Psychiatric: Normal mood, normal affect  : No CVA tenderness. No jo.   Skin: No rashes    Hospital Medications:   MEDICATIONS  (STANDING):  chlorhexidine 2% Cloths 1 Application(s) Topical <User Schedule>  heparin   Injectable 5000 Unit(s) SubCutaneous every 8 hours  lactated ringers. 1000 milliLiter(s) (75 mL/Hr) IV Continuous <Continuous>  rosuvastatin 5 milliGRAM(s) Oral at bedtime        VITALS:  T(F): 97.9 (04-30-25 @ 13:03), Max: 102 (04-30-25 @ 04:30)  HR: 71 (04-30-25 @ 13:03)  BP: 147/70 (04-30-25 @ 13:03)  RR: 18 (04-30-25 @ 13:03)  SpO2: 98% (04-30-25 @ 13:03)  Wt(kg): --    04-30 @ 07:01  -  04-30 @ 13:21  --------------------------------------------------------  IN: 0 mL / OUT: 250 mL / NET: -250 mL          LABS:  04-30    138  |  106  |  43[H]  ----------------------------<  131[H]  4.8   |  20  |  1.8[H]    Ca    8.1[L]      30 Apr 2025 06:32  Mg     2.1     04-30    TPro  5.6[L]  /  Alb  2.9[L]  /  TBili  0.5  /  DBili      /  AST  69[H]  /  ALT  47[H]  /  AlkPhos  95  04-30                          7.4    7.61  )-----------( 235      ( 30 Apr 2025 06:32 )             23.3       Urine Studies:  Urinalysis Basic - ( 30 Apr 2025 06:32 )    Color:  / Appearance:  / SG:  / pH:   Gluc: 131 mg/dL / Ketone:   / Bili:  / Urobili:    Blood:  / Protein:  / Nitrite:    Leuk Esterase:  / RBC:  / WBC    Sq Epi:  / Non Sq Epi:  / Bacteria:       Creatinine, Random Urine: 55 mg/dL (04-25 @ 05:01)  Protein/Creatinine Ratio Calculation: 0.7 Ratio (04-25 @ 05:01)  Sodium, Random Urine: 126.0 mmoL/L (04-25 @ 05:01)  Osmolality, Random Urine: 629 mos/kg (04-25 @ 05:01)      RADIOLOGY & ADDITIONAL STUDIES:             
NEPHROLOGY FOLLOW UP NOTE    pt seen and examined  no complaints   no abx per ID    PAST MEDICAL & SURGICAL HISTORY:  HTN (hypertension)      HLD (hyperlipidemia)      Cataract      S/P liudmila      H/O lumpectomy  left      History of dilation and curettage      Status post cataract extraction and insertion of intraocular lens, left        Allergies:  penicillin (Unknown)  latex (Rash)    Home Medications Reviewed    SOCIAL HISTORY:  Denies ETOH,Smoking,   FAMILY HISTORY:        REVIEW OF SYSTEMS:  All other review of systems is negative unless indicated above.    PHYSICAL EXAM:  Constitutional: NAD, thin  HEENT: anicteric sclera, oropharynx clear, MMM  Neck: No JVD  Respiratory: CTAB, no wheezes, rales or rhonchi  Cardiovascular: S1, S2, RRR  Gastrointestinal: BS+, soft, NT/ND  Extremities: No cyanosis or clubbing. No peripheral edema  Neurological: confused   Psychiatric: Normal mood, normal affect  : No CVA tenderness. No jo.   Skin: No rashes    Hospital Medications:   MEDICATIONS  (STANDING):  amLODIPine   Tablet 5 milliGRAM(s) Oral daily  chlorhexidine 2% Cloths 1 Application(s) Topical <User Schedule>  heparin   Injectable 5000 Unit(s) SubCutaneous every 8 hours  lactated ringers. 1000 milliLiter(s) (75 mL/Hr) IV Continuous <Continuous>  rosuvastatin 5 milliGRAM(s) Oral at bedtime        VITALS:  T(F): 97.6 (05-02-25 @ 12:40), Max: 98.2 (05-02-25 @ 04:25)  HR: 69 (05-02-25 @ 12:40)  BP: 144/72 (05-02-25 @ 12:40)  RR: 18 (05-02-25 @ 12:40)  SpO2: 97% (05-02-25 @ 12:40)  Wt(kg): --    04-30 @ 07:01  -  05-01 @ 07:00  --------------------------------------------------------  IN: 0 mL / OUT: 250 mL / NET: -250 mL    05-01 @ 07:01  -  05-02 @ 07:00  --------------------------------------------------------  IN: 0 mL / OUT: 0 mL / NET: 0 mL          LABS:  05-02    138  |  107  |  41[H]  ----------------------------<  110[H]  4.5   |  20  |  1.9[H]    Ca    8.2[L]      02 May 2025 06:47  Mg     2.2     05-02    TPro  6.1  /  Alb  2.8[L]  /  TBili  0.4  /  DBili      /  AST  48[H]  /  ALT  30  /  AlkPhos  101  05-02                          7.8    8.01  )-----------( 262      ( 02 May 2025 06:47 )             24.7       Urine Studies:  Urinalysis Basic - ( 02 May 2025 06:47 )    Color:  / Appearance:  / SG:  / pH:   Gluc: 110 mg/dL / Ketone:   / Bili:  / Urobili:    Blood:  / Protein:  / Nitrite:    Leuk Esterase:  / RBC:  / WBC    Sq Epi:  / Non Sq Epi:  / Bacteria:           RADIOLOGY & ADDITIONAL STUDIES:       
NEPHROLOGY FOLLOW UP NOTE    pt seen and examined  s/p iv contrast 4/27        PAST MEDICAL & SURGICAL HISTORY:  HTN (hypertension)      HLD (hyperlipidemia)      Cataract      S/P liudmila      H/O lumpectomy  left      History of dilation and curettage      Status post cataract extraction and insertion of intraocular lens, left        Allergies:  penicillin (Unknown)  latex (Rash)    Home Medications Reviewed    SOCIAL HISTORY:  Denies ETOH,Smoking,   FAMILY HISTORY:        REVIEW OF SYSTEMS:  All other review of systems is negative unless indicated above.    PHYSICAL EXAM:  Constitutional: NAD, thin  HEENT: anicteric sclera, oropharynx clear, MMM  Neck: No JVD  Respiratory: CTAB, no wheezes, rales or rhonchi  Cardiovascular: S1, S2, RRR  Gastrointestinal: BS+, soft, NT/ND  Extremities: No cyanosis or clubbing. No peripheral edema  Neurological: confused   Psychiatric: Normal mood, normal affect  : No CVA tenderness. No jo.   Skin: No rashes    Hospital Medications:   MEDICATIONS  (STANDING):  chlorhexidine 2% Cloths 1 Application(s) Topical <User Schedule>  heparin   Injectable 5000 Unit(s) SubCutaneous once  lactated ringers. 1000 milliLiter(s) (75 mL/Hr) IV Continuous <Continuous>  magnesium sulfate  IVPB 2 Gram(s) IV Intermittent once  rosuvastatin 5 milliGRAM(s) Oral at bedtime  sodium bicarbonate 650 milliGRAM(s) Oral every 8 hours        VITALS:  T(F): 98.3 (04-28-25 @ 04:54), Max: 98.3 (04-28-25 @ 04:54)  HR: 72 (04-28-25 @ 04:54)  BP: 154/83 (04-28-25 @ 04:54)  RR: 18 (04-28-25 @ 04:54)  SpO2: 98% (04-28-25 @ 04:54)  Wt(kg): --        LABS:  04-28    137  |  104  |  40[H]  ----------------------------<  83  4.7   |  21  |  1.6[H]    Ca    8.5      28 Apr 2025 05:22  Mg     1.7     04-28    TPro  5.7[L]  /  Alb  2.7[L]  /  TBili  0.5  /  DBili      /  AST  83[H]  /  ALT  65[H]  /  AlkPhos  93  04-28                          7.7    6.56  )-----------( 215      ( 28 Apr 2025 05:22 )             24.4       Urine Studies:  Urinalysis Basic - ( 28 Apr 2025 05:22 )    Color:  / Appearance:  / SG:  / pH:   Gluc: 83 mg/dL / Ketone:   / Bili:  / Urobili:    Blood:  / Protein:  / Nitrite:    Leuk Esterase:  / RBC:  / WBC    Sq Epi:  / Non Sq Epi:  / Bacteria:       Creatinine, Random Urine: 55 mg/dL (04-25 @ 05:01)  Protein/Creatinine Ratio Calculation: 0.7 Ratio (04-25 @ 05:01)  Sodium, Random Urine: 126.0 mmoL/L (04-25 @ 05:01)  Osmolality, Random Urine: 629 mos/kg (04-25 @ 05:01)      RADIOLOGY & ADDITIONAL STUDIES:         
NEPHROLOGY FOLLOW UP NOTE    pt seen and examined  still confused       PAST MEDICAL & SURGICAL HISTORY:  HTN (hypertension)      HLD (hyperlipidemia)      Cataract      S/P liudmila      H/O lumpectomy  left      History of dilation and curettage      Status post cataract extraction and insertion of intraocular lens, left        Allergies:  penicillin (Unknown)  latex (Rash)    Home Medications Reviewed    SOCIAL HISTORY:  Denies ETOH,Smoking,   FAMILY HISTORY:        REVIEW OF SYSTEMS:  All other review of systems is negative unless indicated above.    PHYSICAL EXAM:  Constitutional: NAD, thin  HEENT: anicteric sclera, oropharynx clear, MMM  Neck: No JVD  Respiratory: CTAB, no wheezes, rales or rhonchi  Cardiovascular: S1, S2, RRR  Gastrointestinal: BS+, soft, NT/ND  Extremities: No cyanosis or clubbing. No peripheral edema  Neurological: confused   Psychiatric: Normal mood, normal affect  : No CVA tenderness. No jo.   Skin: No rashes    Hospital Medications:   MEDICATIONS  (STANDING):  aztreonam  IVPB 1000 milliGRAM(s) IV Intermittent every 12 hours  chlorhexidine 2% Cloths 1 Application(s) Topical <User Schedule>  heparin   Injectable 5000 Unit(s) SubCutaneous every 12 hours  lactated ringers. 1000 milliLiter(s) (60 mL/Hr) IV Continuous <Continuous>  rosuvastatin 5 milliGRAM(s) Oral at bedtime  sodium bicarbonate 650 milliGRAM(s) Oral every 8 hours        VITALS:  T(F): 97.9 (04-25-25 @ 04:30), Max: 99.6 (04-24-25 @ 19:42)  HR: 76 (04-25-25 @ 04:30)  BP: 137/85 (04-25-25 @ 04:30)  RR: 18 (04-25-25 @ 04:30)  SpO2: 97% (04-25-25 @ 04:30)  Wt(kg): --        LABS:  04-25    139  |  109  |  50[H]  ----------------------------<  90  5.4[H]   |  20  |  1.5    Ca    9.0      25 Apr 2025 08:27  Phos  3.1     04-25  Mg     1.8     04-25    TPro  5.9[L]  /  Alb  3.1[L]  /  TBili  <0.2  /  DBili      /  AST  81[H]  /  ALT  63[H]  /  AlkPhos  95  04-24                          8.4    8.05  )-----------( 247      ( 25 Apr 2025 08:27 )             26.1       Urine Studies:  Urinalysis Basic - ( 25 Apr 2025 08:27 )    Color:  / Appearance:  / SG:  / pH:   Gluc: 90 mg/dL / Ketone:   / Bili:  / Urobili:    Blood:  / Protein:  / Nitrite:    Leuk Esterase:  / RBC:  / WBC    Sq Epi:  / Non Sq Epi:  / Bacteria:       Creatinine, Random Urine: 55 mg/dL (04-25 @ 05:01)  Protein/Creatinine Ratio Calculation: 0.7 Ratio (04-25 @ 05:01)  Sodium, Random Urine: 126.0 mmoL/L (04-25 @ 05:01)  Osmolality, Random Urine: 629 mos/kg (04-25 @ 05:01)      RADIOLOGY & ADDITIONAL STUDIES:       
Patient is a 75y old  Female who presents with a chief complaint of UTI (2025 13:01)    INTERVAL HPI/OVERNIGHT EVENTS: Patient was examined and seen at bedside. This afternoon pt is resting comfortably in bed and reports no new issues or overnight events. Had fever of 102 as per staff. Denies dysuria, cough, URI Sx, AP. No complaints, feels well. MILTON at bedside.  ROS: Denies CP, SOB, AP, new weakness  All other systems reviewed and are within normal limits.  InitialHPI:  75-year-old female past medical history of hyper tension hyperlipidemia brought in by  at bedside who is providing history for concern for UTI.  Patient's been having a slowly decreased decline in her mental status 4 days ago had a home test where she was found to have a UTI and it was positive. Her PCP prescribed nitrofurantoin 210 which patient is already took 4 doses however today noticed low grade fever which is what prompted the visit.  No chest pain, shortness of breath, nausea, vomiting, no flank pain.     (2025 08:56)    PAST MEDICAL & SURGICAL HISTORY:  HTN (hypertension)      HLD (hyperlipidemia)      Cataract      S/P liudmila      H/O lumpectomy  left      History of dilation and curettage      Status post cataract extraction and insertion of intraocular lens, left          General: NAD, AAOx1  HEENT:  EOMI, no LAD  CV: S1 S2  Resp: decreased breath sounds at bases  GI: NT/ND/S +BS  MS: no clubbing/cyanosis/edema, + pulses b/l  Neuro: nonfocal, +reflexes thruout            Home Medications:  olmesartan 20 mg oral tablet: 1 tab(s) orally once a day (2022 08:43)  rosuvastatin 5 mg oral capsule: 1 cap(s) orally once a day (2022 08:43)    MEDICATIONS  (STANDING):  chlorhexidine 2% Cloths 1 Application(s) Topical <User Schedule>  heparin   Injectable 5000 Unit(s) SubCutaneous every 8 hours  lactated ringers. 1000 milliLiter(s) (75 mL/Hr) IV Continuous <Continuous>  rosuvastatin 5 milliGRAM(s) Oral at bedtime    MEDICATIONS  (PRN):  acetaminophen     Tablet .. 650 milliGRAM(s) Oral every 6 hours PRN Temp greater or equal to 38C (100.4F), Mild Pain (1 - 3)  aluminum hydroxide/magnesium hydroxide/simethicone Suspension 30 milliLiter(s) Oral every 4 hours PRN Dyspepsia  melatonin 3 milliGRAM(s) Oral at bedtime PRN Insomnia  midazolam Injectable 2 milliGRAM(s) IV Push once PRN pre-MRI  ondansetron Injectable 4 milliGRAM(s) IV Push every 8 hours PRN Nausea and/or Vomiting    Vital Signs Last 24 Hrs  T(C): 36.6 (2025 13:03), Max: 38.9 (2025 04:30)  T(F): 97.9 (2025 13:03), Max: 102 (2025 04:30)  HR: 71 (:03) (71 - 75)  BP: 147/70 (2025 13:03) (116/65 - 155/65)  BP(mean): 95 (2025 13:03) (80 - 95)  RR: 18 (2025 13:03) (18 - 18)  SpO2: 98% (2025 13:03) (94% - 100%)    Parameters below as of 2025 13:03  Patient On (Oxygen Delivery Method): room air      CAPILLARY BLOOD GLUCOSE        LABS:                        7.4    7.61  )-----------( 235      ( 2025 06:32 )             23.3     30    138  |  106  |  43[H]  ----------------------------<  131[H]  4.8   |  20  |  1.8[H]    Ca    8.1[L]      2025 06:32  Mg     2.1         TPro  5.6[L]  /  Alb  2.9[L]  /  TBili  0.5  /  DBili  x   /  AST  69[H]  /  ALT  47[H]  /  AlkPhos  95  30    LIVER FUNCTIONS - ( 2025 06:32 )  Alb: 2.9 g/dL / Pro: 5.6 g/dL / ALK PHOS: 95 U/L / ALT: 47 U/L / AST: 69 U/L / GGT: x               PT/INR - ( 2025 07:55 )   PT: 12.30 sec;   INR: 1.04 ratio         PTT - ( 2025 07:55 )  PTT:25.1 sec  Urinalysis Basic - ( 2025 13:00 )    Color: Yellow / Appearance: Cloudy / S.023 / pH: x  Gluc: x / Ketone: Negative mg/dL  / Bili: Negative / Urobili: 2.0 mg/dL   Blood: x / Protein: 100 mg/dL / Nitrite: Negative   Leuk Esterase: Large / RBC: 1 /HPF /  /HPF   Sq Epi: x / Non Sq Epi: 6 /HPF / Bacteria: Occasional /HPF              Consultant Notes Reviewed:  [x ] YES  [ ] NO  Care Discussed with Consultants/Other Providers/ Housestaff [ x] YES  [ ] NO  Radiology, labs, EKGs, new studies personally reviewed.                                                                                            
SUBJECTIVE/OVERNIGHT EVENTS  Today is hospital day 6d. This morning patient was seen and examined at bedside, resting comfortably in bed. No acute or major events overnight.    MEDICATIONS  STANDING MEDICATIONS  chlorhexidine 2% Cloths 1 Application(s) Topical <User Schedule>  lactated ringers. 1000 milliLiter(s) IV Continuous <Continuous>  rosuvastatin 5 milliGRAM(s) Oral at bedtime    PRN MEDICATIONS  acetaminophen     Tablet .. 650 milliGRAM(s) Oral every 6 hours PRN  aluminum hydroxide/magnesium hydroxide/simethicone Suspension 30 milliLiter(s) Oral every 4 hours PRN  melatonin 3 milliGRAM(s) Oral at bedtime PRN  midazolam Injectable 2 milliGRAM(s) IV Push once PRN  ondansetron Injectable 4 milliGRAM(s) IV Push every 8 hours PRN    VITALS  T(F): 98.8 (04-29-25 @ 09:04), Max: 98.8 (04-29-25 @ 09:04)  HR: 61 (04-29-25 @ 09:04) (61 - 73)  BP: 129/54 (04-29-25 @ 09:04) (128/80 - 150/69)  RR: 18 (04-29-25 @ 09:04) (18 - 18)  SpO2: 97% (04-29-25 @ 09:04) (96% - 97%)    PHYSICAL EXAM  GENERAL  ( x ) NAD, lying in bed comfortably     (  ) obtunded     (  ) lethargic     (  ) somnolent    HEAD  (  ) Atraumatic     (  ) hematoma     (  ) laceration (specify location:       )     NECK  (  ) Supple     (  ) neck stiffness     (  ) nuchal rigidity     (  )  no JVD     (  ) JVD present ( -- cm)    HEART  Rate -->  ( x ) normal rate    (  ) bradycardic    (  ) tachycardic  Rhythm -->  ( x ) regular    (  ) regularly irregular    (  ) irregularly irregular  Murmurs -->  (  ) normal s1/s2    (  ) systolic murmur    (  ) diastolic murmur    (  ) continuous murmur     (  ) S3 present    (  ) S4 present    LUNGS  (  )Unlabored respirations     (  ) tachypnea  ( x ) B/L air entry     (  ) decreased breath sounds in:  (location     )    (  ) no adventitious sound     (  ) crackles     (  ) wheezing      (  ) rhonchi      (specify location:       )  (  ) chest wall tenderness (specify location:       )    ABDOMEN  ( x ) Soft     (  ) tense   |   (  ) nondistended     (  ) distended   |   (  ) +BS     (  ) hypoactive bowel sounds     (  ) hyperactive bowel sounds  (  ) nontender     (  ) RUQ tenderness     (  ) RLQ tenderness     (  ) LLQ tenderness     (  ) epigastric tenderness     (  ) diffuse tenderness  (  ) Splenomegaly      (  ) Hepatomegaly      (  ) Jaundice     (  ) ecchymosis     EXTREMITIES  ( x ) Normal     (  ) Rash     (  ) ecchymosis     (  ) varicose veins      (  ) pitting edema     (  ) non-pitting edema   (  ) ulceration     (  ) gangrene:     (location:     )    LABS             7.4    5.97  )-----------( 225      ( 04-29-25 @ 07:55 )             23.0     139  |  105  |  42  -------------------------<  91   04-29-25 @ 07:55  4.6  |  36  |  1.8    Ca      8.4     04-29-25 @ 07:55  Mg     2.7     04-29-25 @ 07:55    TPro  5.5  /  Alb  2.6  /  TBili  0.3  /  DBili  x   /  AST  71  /  ALT  51  /  AlkPhos  96  /  GGT  x     04-29-25 @ 07:55    PT/INR - ( 04-29-25 @ 07:55 )   PT: 12.30 sec;   INR: 1.04 ratio  PTT - ( 04-29-25 @ 07:55 )  PTT:25.1 sec      Urinalysis Basic - ( 29 Apr 2025 07:55 )    Color: x / Appearance: x / SG: x / pH: x  Gluc: 91 mg/dL / Ketone: x  / Bili: x / Urobili: x   Blood: x / Protein: x / Nitrite: x   Leuk Esterase: x / RBC: x / WBC x   Sq Epi: x / Non Sq Epi: x / Bacteria: x          IMAGING
SUBJECTIVE/OVERNIGHT EVENTS  Today is hospital day 8d. This morning patient was seen and examined at bedside, resting comfortably in bed. No acute or major events overnight.    MEDICATIONS  STANDING MEDICATIONS  chlorhexidine 2% Cloths 1 Application(s) Topical <User Schedule>  heparin   Injectable 5000 Unit(s) SubCutaneous every 8 hours  lactated ringers. 1000 milliLiter(s) IV Continuous <Continuous>  rosuvastatin 5 milliGRAM(s) Oral at bedtime    PRN MEDICATIONS  acetaminophen     Tablet .. 650 milliGRAM(s) Oral every 6 hours PRN  aluminum hydroxide/magnesium hydroxide/simethicone Suspension 30 milliLiter(s) Oral every 4 hours PRN  melatonin 3 milliGRAM(s) Oral at bedtime PRN  midazolam Injectable 2 milliGRAM(s) IV Push once PRN  ondansetron Injectable 4 milliGRAM(s) IV Push every 8 hours PRN    VITALS  T(F): 98.1 (05-01-25 @ 05:03), Max: 101 (04-30-25 @ 19:27)  HR: 72 (05-01-25 @ 05:03) (71 - 72)  BP: 155/72 (05-01-25 @ 05:03) (126/64 - 155/72)  RR: 18 (05-01-25 @ 05:03) (16 - 18)  SpO2: 96% (05-01-25 @ 05:03) (96% - 98%)    PHYSICAL EXAM  GENERAL  ( x ) NAD, lying in bed comfortably     (  ) obtunded     (  ) lethargic     (  ) somnolent    HEAD  (  ) Atraumatic     (  ) hematoma     (  ) laceration (specify location:       )     NECK  (  ) Supple     (  ) neck stiffness     (  ) nuchal rigidity     (  )  no JVD     (  ) JVD present ( -- cm)    HEART  Rate -->  ( x ) normal rate    (  ) bradycardic    (  ) tachycardic  Rhythm -->  ( x ) regular    (  ) regularly irregular    (  ) irregularly irregular  Murmurs -->  (  ) normal s1/s2    (  ) systolic murmur    (  ) diastolic murmur    (  ) continuous murmur     (  ) S3 present    (  ) S4 present    LUNGS  (x  )Unlabored respirations     (  ) tachypnea  (  ) B/L air entry     (  ) decreased breath sounds in:  (location     )    (  ) no adventitious sound     (  ) crackles     (  ) wheezing      (  ) rhonchi      (specify location:       )  (  ) chest wall tenderness (specify location:       )    ABDOMEN  ( x ) Soft     (  ) tense   |   (  ) nondistended     (  ) distended   |   (  ) +BS     (  ) hypoactive bowel sounds     (  ) hyperactive bowel sounds  (  ) nontender     (  ) RUQ tenderness     (  ) RLQ tenderness     (  ) LLQ tenderness     (  ) epigastric tenderness     (  ) diffuse tenderness  (  ) Splenomegaly      (  ) Hepatomegaly      (  ) Jaundice     (  ) ecchymosis     EXTREMITIES  ( x ) Normal     (  ) Rash     (  ) ecchymosis     (  ) varicose veins      (  ) pitting edema     (  ) non-pitting edema   (  ) ulceration     (  ) gangrene:     (location:     )    LABS             8.0    8.76  )-----------( 263      ( 05-01-25 @ 05:23 )             25.6     141  |  108  |  41  -------------------------<  94   05-01-25 @ 05:23  4.6  |  21  |  1.9    Ca      8.5     05-01-25 @ 05:23  Mg     2.3     05-01-25 @ 05:23    TPro  6.1  /  Alb  2.8  /  TBili  0.4  /  DBili  x   /  AST  56  /  ALT  38  /  AlkPhos  105  /  GGT  x     05-01-25 @ 05:23        Urinalysis Basic - ( 01 May 2025 05:23 )    Color: x / Appearance: x / SG: x / pH: x  Gluc: 94 mg/dL / Ketone: x  / Bili: x / Urobili: x   Blood: x / Protein: x / Nitrite: x   Leuk Esterase: x / RBC: x / WBC x   Sq Epi: x / Non Sq Epi: x / Bacteria: x          Culture - Blood (collected 29 Apr 2025 20:17)  Source: Blood Blood  Preliminary Report (01 May 2025 03:02):    No growth at 24 hours      IMAGING
SUBJECTIVE:    Patient is a 75y old Female who presents with a chief complaint of UTI (26 Apr 2025 08:50)    Currently admitted to medicine with the primary diagnosis of Acute UTI       Today is hospital day 3d. This morning she is resting comfortably in bed and reports no new issues or overnight events.     PAST MEDICAL & SURGICAL HISTORY  HTN (hypertension)    HLD (hyperlipidemia)    Cataract    S/P liudmila    H/O lumpectomy  left    History of dilation and curettage    Status post cataract extraction and insertion of intraocular lens, left      SOCIAL HISTORY:  Negative for smoking/alcohol/drug use.     ALLERGIES:  penicillin (Unknown)  latex (Rash)    MEDICATIONS:  STANDING MEDICATIONS  aztreonam  IVPB 1000 milliGRAM(s) IV Intermittent every 12 hours  chlorhexidine 2% Cloths 1 Application(s) Topical <User Schedule>  heparin   Injectable 5000 Unit(s) SubCutaneous every 12 hours  rosuvastatin 5 milliGRAM(s) Oral at bedtime  sodium bicarbonate 650 milliGRAM(s) Oral every 8 hours  sodium chloride 0.9%. 1000 milliLiter(s) IV Continuous <Continuous>    PRN MEDICATIONS  acetaminophen     Tablet .. 650 milliGRAM(s) Oral every 6 hours PRN  aluminum hydroxide/magnesium hydroxide/simethicone Suspension 30 milliLiter(s) Oral every 4 hours PRN  melatonin 3 milliGRAM(s) Oral at bedtime PRN  ondansetron Injectable 4 milliGRAM(s) IV Push every 8 hours PRN    VITALS:   T(F): 98.6  HR: 79  BP: 141/57  RR: 18  SpO2: 98%    LABS:                        8.1    7.18  )-----------( 244      ( 26 Apr 2025 07:23 )             25.0     04-26    139  |  109  |  44[H]  ----------------------------<  87  5.2[H]   |  19  |  1.5    Ca    8.8      26 Apr 2025 07:23  Phos  3.1     04-25  Mg     1.8     04-25    TPro  6.1  /  Alb  2.9[L]  /  TBili  0.5  /  DBili  x   /  AST  90[H]  /  ALT  67[H]  /  AlkPhos  94  04-26      Urinalysis Basic - ( 26 Apr 2025 07:23 )    Color: x / Appearance: x / SG: x / pH: x  Gluc: 87 mg/dL / Ketone: x  / Bili: x / Urobili: x   Blood: x / Protein: x / Nitrite: x   Leuk Esterase: x / RBC: x / WBC x   Sq Epi: x / Non Sq Epi: x / Bacteria: x            Culture - Blood (collected 23 Apr 2025 23:59)  Source: Blood Blood-Peripheral  Preliminary Report (26 Apr 2025 09:01):    No growth at 48 Hours    Culture - Blood (collected 23 Apr 2025 23:59)  Source: Blood Blood-Peripheral  Preliminary Report (26 Apr 2025 09:01):    No growth at 48 Hours    Culture - Urine (collected 23 Apr 2025 21:04)  Source: Catheterized Catheterized  Final Report (25 Apr 2025 09:37):    <10,000 CFU/mL Normal Urogenital Willa          RADIOLOGY:    < from: MR Head w/wo IV Cont (04.25.25 @ 22:17) >    IMPRESSION:    No acute intracranial pathology or abnormal enhancement.    --- End of Report ---        < end of copied text >      PHYSICAL EXAM:  GEN: No acute distress  LUNGS: Clear to auscultation bilaterally   HEART: S1/S2 present. RRR.   ABD: Soft, non-tender, non-distended. Bowel sounds present  EXT: NC/NC/NE/2+PP/ARNOLD  NEURO: AAOX3    
patient seen and examined.  has no c/o SOB, chest pain no difficulty with urinating. no hematuria. no dysuria.  not eating well.   Vital Signs Last 24 Hrs  T(C): 36.9 (27 Apr 2025 04:31), Max: 37.3 (26 Apr 2025 19:24)  T(F): 98.4 (27 Apr 2025 04:31), Max: 99.2 (26 Apr 2025 19:24)  HR: 71 (27 Apr 2025 04:31) (71 - 82)  BP: 146/70 (27 Apr 2025 04:31) (132/67 - 146/70)  BP(mean): 94 (26 Apr 2025 13:19) (94 - 94)  RR: 20 (26 Apr 2025 20:20) (18 - 20)  SpO2: 97% (27 Apr 2025 04:31) (96% - 97%)    Parameters below as of 26 Apr 2025 20:20  Patient On (Oxygen Delivery Method): room air     conj pale, no jaundice  neck no JVD. supple.  lungs clear to auscultation. no crackles no wheezing  heart regular rate and rhythm. no murmur no gallop  abd Bs pos. soft nontender  extremities no edema. skin turgor OK                       8.0    6.89  )-----------( 254      ( 27 Apr 2025 04:55 )             25.4       04-27    139  |  109  |  44[H]  ----------------------------<  94  4.8   |  19  |  1.6[H]    Ca    8.4      27 Apr 2025 04:55    TPro  5.8[L]  /  Alb  2.8[L]  /  TBili  0.4  /  DBili  x   /  AST  102[H]  /  ALT  77[H]  /  AlkPhos  94  04-27    MEDICATIONS  (STANDING):  chlorhexidine 2% Cloths 1 Application(s) Topical <User Schedule>  heparin   Injectable 5000 Unit(s) SubCutaneous every 12 hours  lactated ringers. 1000 milliLiter(s) (75 mL/Hr) IV Continuous <Continuous>  rosuvastatin 5 milliGRAM(s) Oral at bedtime  sodium bicarbonate 650 milliGRAM(s) Oral every 8 hours  
Patient is a 75y old  Female who presents with a chief complaint of UTI (25 Apr 2025 13:01)    INTERVAL HPI/OVERNIGHT EVENTS: Patient was examined and seen at bedside. This afternoon pt is resting comfortably in bed and reports no new issues or overnight events. No fevers. Denies dysuria, cough, URI Sx, AP. No complaints, feels well.  at bedside.  ROS: Denies CP, SOB, AP, new weakness  All other systems reviewed and are within normal limits.  InitialHPI:  75-year-old female past medical history of hyper tension hyperlipidemia brought in by  at bedside who is providing history for concern for UTI.  Patient's been having a slowly decreased decline in her mental status 4 days ago had a home test where she was found to have a UTI and it was positive. Her PCP prescribed nitrofurantoin 210 which patient is already took 4 doses however today noticed low grade fever which is what prompted the visit.  No chest pain, shortness of breath, nausea, vomiting, no flank pain.     (24 Apr 2025 08:56)    PAST MEDICAL & SURGICAL HISTORY:  HTN (hypertension)      HLD (hyperlipidemia)      Cataract      S/P liudmila      H/O lumpectomy  left      History of dilation and curettage      Status post cataract extraction and insertion of intraocular lens, left          General: NAD, AAOx1  HEENT:  EOMI, no LAD  CV: S1 S2  Resp: decreased breath sounds at bases  GI: NT/ND/S +BS  MS: no clubbing/cyanosis/edema, + pulses b/l  Neuro: nonfocal, +reflexes thruout          Home Medications:  rosuvastatin 5 mg oral capsule: 1 cap(s) orally once a day (17 Nov 2022 08:43)    MEDICATIONS  (STANDING):  amLODIPine   Tablet 5 milliGRAM(s) Oral daily  chlorhexidine 2% Cloths 1 Application(s) Topical <User Schedule>  heparin   Injectable 5000 Unit(s) SubCutaneous every 8 hours  lactated ringers. 1000 milliLiter(s) (75 mL/Hr) IV Continuous <Continuous>  rosuvastatin 5 milliGRAM(s) Oral at bedtime    MEDICATIONS  (PRN):  acetaminophen     Tablet .. 650 milliGRAM(s) Oral every 6 hours PRN Temp greater or equal to 38C (100.4F), Mild Pain (1 - 3)  aluminum hydroxide/magnesium hydroxide/simethicone Suspension 30 milliLiter(s) Oral every 4 hours PRN Dyspepsia  melatonin 3 milliGRAM(s) Oral at bedtime PRN Insomnia  midazolam Injectable 2 milliGRAM(s) IV Push once PRN pre-MRI  ondansetron Injectable 4 milliGRAM(s) IV Push every 8 hours PRN Nausea and/or Vomiting    Vital Signs Last 24 Hrs  T(C): 36.4 (02 May 2025 12:40), Max: 36.8 (02 May 2025 04:25)  T(F): 97.6 (02 May 2025 12:40), Max: 98.2 (02 May 2025 04:25)  HR: 69 (02 May 2025 12:40) (69 - 77)  BP: 144/72 (02 May 2025 12:40) (144/72 - 155/69)  BP(mean): 96 (02 May 2025 12:40) (96 - 98)  RR: 18 (02 May 2025 12:40) (18 - 18)  SpO2: 97% (02 May 2025 12:40) (96% - 97%)    Parameters below as of 02 May 2025 12:40  Patient On (Oxygen Delivery Method): room air      CAPILLARY BLOOD GLUCOSE        LABS:                        7.8    8.01  )-----------( 262      ( 02 May 2025 06:47 )             24.7     05-02    138  |  107  |  41[H]  ----------------------------<  110[H]  4.5   |  20  |  1.9[H]    Ca    8.2[L]      02 May 2025 06:47  Mg     2.2     05-02    TPro  6.1  /  Alb  2.8[L]  /  TBili  0.4  /  DBili  x   /  AST  48[H]  /  ALT  30  /  AlkPhos  101  05-02    LIVER FUNCTIONS - ( 02 May 2025 06:47 )  Alb: 2.8 g/dL / Pro: 6.1 g/dL / ALK PHOS: 101 U/L / ALT: 30 U/L / AST: 48 U/L / GGT: x                 Urinalysis Basic - ( 02 May 2025 06:47 )    Color: x / Appearance: x / SG: x / pH: x  Gluc: 110 mg/dL / Ketone: x  / Bili: x / Urobili: x   Blood: x / Protein: x / Nitrite: x   Leuk Esterase: x / RBC: x / WBC x   Sq Epi: x / Non Sq Epi: x / Bacteria: x              Culture - Urine (collected 30 Apr 2025 13:00)  Source: Clean Catch Clean Catch (Midstream)  Final Report (01 May 2025 19:17):    No growth    Culture - Blood (collected 30 Apr 2025 11:13)  Source: Blood None  Preliminary Report (01 May 2025 23:01):    No growth at 24 hours    Culture - Blood (collected 29 Apr 2025 20:17)  Source: Blood Blood  Preliminary Report (02 May 2025 03:01):    No growth at 48 Hours      Consultant Notes Reviewed:  [x ] YES  [ ] NO  Care Discussed with Consultants/Other Providers/ Housestaff [ x] YES  [ ] NO  Radiology, labs, EKGs, new studies personally reviewed.                                                                                                    
SUBJECTIVE/OVERNIGHT EVENTS  Today is hospital day 2d. This morning patient was seen and examined at bedside, resting comfortably in bed. No acute or major events overnight.    MEDICATIONS  STANDING MEDICATIONS  aztreonam  IVPB 1000 milliGRAM(s) IV Intermittent every 12 hours  chlorhexidine 2% Cloths 1 Application(s) Topical <User Schedule>  heparin   Injectable 5000 Unit(s) SubCutaneous every 12 hours  lactated ringers. 1000 milliLiter(s) IV Continuous <Continuous>  rosuvastatin 5 milliGRAM(s) Oral at bedtime  sodium bicarbonate 650 milliGRAM(s) Oral every 8 hours    PRN MEDICATIONS  acetaminophen     Tablet .. 650 milliGRAM(s) Oral every 6 hours PRN  aluminum hydroxide/magnesium hydroxide/simethicone Suspension 30 milliLiter(s) Oral every 4 hours PRN  melatonin 3 milliGRAM(s) Oral at bedtime PRN  midazolam Injectable 2 milliGRAM(s) IV Push once PRN  ondansetron Injectable 4 milliGRAM(s) IV Push every 8 hours PRN    VITALS  T(F): 97.9 (04-25-25 @ 04:30), Max: 99.6 (04-24-25 @ 19:42)  HR: 76 (04-25-25 @ 04:30) (76 - 82)  BP: 137/85 (04-25-25 @ 04:30) (137/85 - 154/65)  RR: 18 (04-25-25 @ 04:30) (18 - 18)  SpO2: 97% (04-25-25 @ 04:30) (97% - 98%)    PHYSICAL EXAM  GENERAL  (x ) NAD, lying in bed comfortably     (  ) obtunded     (  ) lethargic     (  ) somnolent    HEAD  (  ) Atraumatic     (  ) hematoma     (  ) laceration (specify location:       )     NECK  (  ) Supple     (  ) neck stiffness     (  ) nuchal rigidity     (  )  no JVD     (  ) JVD present ( -- cm)    HEART  Rate -->  ( x ) normal rate    (  ) bradycardic    (  ) tachycardic  Rhythm -->  (x) regular    (  ) regularly irregular    (  ) irregularly irregular  Murmurs -->  (  ) normal s1/s2    (  ) systolic murmur    (  ) diastolic murmur    (  ) continuous murmur     (  ) S3 present    (  ) S4 present    LUNGS  (  )Unlabored respirations     (  ) tachypnea  (  x) B/L air entry     (  ) decreased breath sounds in:  (location     )    (  ) no adventitious sound     (  ) crackles     (  ) wheezing      (  ) rhonchi      (specify location:       )  (  ) chest wall tenderness (specify location:       )    ABDOMEN  ( x ) Soft     (  ) tense   |   (  ) nondistended     (  ) distended   |   (  ) +BS     (  ) hypoactive bowel sounds     (  ) hyperactive bowel sounds  (  ) nontender     (  ) RUQ tenderness     (  ) RLQ tenderness     (  ) LLQ tenderness     (  ) epigastric tenderness     (  ) diffuse tenderness  (  ) Splenomegaly      (  ) Hepatomegaly      (  ) Jaundice     (  ) ecchymosis     EXTREMITIES  ( x ) Normal     (  ) Rash     (  ) ecchymosis     (  ) varicose veins      (  ) pitting edema     (  ) non-pitting edema   (  ) ulceration     (  ) gangrene:     (location:     )    NERVOUS SYSTEM  (  x) A&Ox1     (  ) confused     (  ) lethargic  CN II-XII:     (  ) Intact     (  ) focal deficits  (Specify:     )   Upper extremities:     (  ) strength X/5     (  ) focal deficit (specify:    )  Lower extremities:     (  ) strength  X/5    (  ) focal deficit (specify:    )      LABS             8.4    8.05  )-----------( 247      ( 04-25-25 @ 08:27 )             26.1     139  |  109  |  50  -------------------------<  90   04-25-25 @ 08:27  5.4  |  20  |  1.5    Ca      9.0     04-25-25 @ 08:27  Phos   3.1     04-25-25 @ 08:27  Mg     1.8     04-25-25 @ 08:27    TPro  5.9  /  Alb  3.1  /  TBili  <0.2  /  DBili  x   /  AST  81  /  ALT  63  /  AlkPhos  95  /  GGT  x     04-24-25 @ 06:32        Urinalysis Basic - ( 25 Apr 2025 08:27 )    Color: x / Appearance: x / SG: x / pH: x  Gluc: 90 mg/dL / Ketone: x  / Bili: x / Urobili: x   Blood: x / Protein: x / Nitrite: x   Leuk Esterase: x / RBC: x / WBC x   Sq Epi: x / Non Sq Epi: x / Bacteria: x          Culture - Blood (collected 23 Apr 2025 23:59)  Source: Blood Blood-Peripheral  Preliminary Report (25 Apr 2025 09:02):    No growth at 24 hours    Culture - Blood (collected 23 Apr 2025 23:59)  Source: Blood Blood-Peripheral  Preliminary Report (25 Apr 2025 09:02):    No growth at 24 hours    Culture - Urine (collected 23 Apr 2025 21:04)  Source: Catheterized Catheterized  Final Report (25 Apr 2025 09:37):    <10,000 CFU/mL Normal Urogenital Willa      IMAGING
Patient is a 75y old  Female who presents with a chief complaint of UTI (25 Apr 2025 13:01)    INTERVAL HPI/OVERNIGHT EVENTS: Patient was examined and seen at bedside. This morning pt is resting comfortably in bed and reports no new issues or overnight events. No complaints, feels well.  at bedside.  ROS: Denies CP, SOB, AP, new weakness  All other systems reviewed and are within normal limits.  InitialHPI:  75-year-old female past medical history of hyper tension hyperlipidemia brought in by  at bedside who is providing history for concern for UTI.  Patient's been having a slowly decreased decline in her mental status 4 days ago had a home test where she was found to have a UTI and it was positive. Her PCP prescribed nitrofurantoin 210 which patient is already took 4 doses however today noticed low grade fever which is what prompted the visit.  No chest pain, shortness of breath, nausea, vomiting, no flank pain.     (24 Apr 2025 08:56)    PAST MEDICAL & SURGICAL HISTORY:  HTN (hypertension)      HLD (hyperlipidemia)      Cataract      S/P liudmila      H/O lumpectomy  left      History of dilation and curettage      Status post cataract extraction and insertion of intraocular lens, left          General: NAD, AAOx1  HEENT:  EOMI, no LAD  CV: S1 S2  Resp: decreased breath sounds at bases  GI: NT/ND/S +BS  MS: no clubbing/cyanosis/edema, + pulses b/l  Neuro: nonfocal, +reflexes thruout            Home Medications:  olmesartan 20 mg oral tablet: 1 tab(s) orally once a day (17 Nov 2022 08:43)  rosuvastatin 5 mg oral capsule: 1 cap(s) orally once a day (17 Nov 2022 08:43)    MEDICATIONS  (STANDING):  chlorhexidine 2% Cloths 1 Application(s) Topical <User Schedule>  lactated ringers. 1000 milliLiter(s) (75 mL/Hr) IV Continuous <Continuous>  rosuvastatin 5 milliGRAM(s) Oral at bedtime  sodium bicarbonate 650 milliGRAM(s) Oral every 8 hours    MEDICATIONS  (PRN):  acetaminophen     Tablet .. 650 milliGRAM(s) Oral every 6 hours PRN Temp greater or equal to 38C (100.4F), Mild Pain (1 - 3)  aluminum hydroxide/magnesium hydroxide/simethicone Suspension 30 milliLiter(s) Oral every 4 hours PRN Dyspepsia  melatonin 3 milliGRAM(s) Oral at bedtime PRN Insomnia  ondansetron Injectable 4 milliGRAM(s) IV Push every 8 hours PRN Nausea and/or Vomiting    Vital Signs Last 24 Hrs  T(C): 36.6 (28 Apr 2025 14:24), Max: 36.8 (28 Apr 2025 04:54)  T(F): 97.9 (28 Apr 2025 14:24), Max: 98.3 (28 Apr 2025 04:54)  HR: 73 (28 Apr 2025 14:24) (72 - 75)  BP: 128/80 (28 Apr 2025 14:24) (128/80 - 154/83)  BP(mean): 96 (28 Apr 2025 14:24) (96 - 96)  RR: 18 (28 Apr 2025 14:24) (18 - 18)  SpO2: 96% (28 Apr 2025 14:24) (96% - 98%)    Parameters below as of 28 Apr 2025 14:24  Patient On (Oxygen Delivery Method): room air      CAPILLARY BLOOD GLUCOSE        LABS:                        7.7    6.56  )-----------( 215      ( 28 Apr 2025 05:22 )             24.4     04-28    137  |  104  |  40[H]  ----------------------------<  83  4.7   |  21  |  1.6[H]    Ca    8.5      28 Apr 2025 05:22  Mg     1.7     04-28    TPro  5.7[L]  /  Alb  2.7[L]  /  TBili  0.5  /  DBili  x   /  AST  83[H]  /  ALT  65[H]  /  AlkPhos  93  04-28    LIVER FUNCTIONS - ( 28 Apr 2025 05:22 )  Alb: 2.7 g/dL / Pro: 5.7 g/dL / ALK PHOS: 93 U/L / ALT: 65 U/L / AST: 83 U/L / GGT: x                 Urinalysis Basic - ( 28 Apr 2025 05:22 )    Color: x / Appearance: x / SG: x / pH: x  Gluc: 83 mg/dL / Ketone: x  / Bili: x / Urobili: x   Blood: x / Protein: x / Nitrite: x   Leuk Esterase: x / RBC: x / WBC x   Sq Epi: x / Non Sq Epi: x / Bacteria: x              Consultant Notes Reviewed:  [x ] YES  [ ] NO  Care Discussed with Consultants/Other Providers/ Housestaff [ x] YES  [ ] NO  Radiology, labs, EKGs, new studies personally reviewed.                                                      
  SOCORRO JOHN  75y, Female    All available historical data reviewed  Independent history obtained from her  at the bedside   OVERNIGHT EVENTS:  feels well and has no new complaints  No fevers      ROS:  General: Denies rigors, nightsweats  HEENT: Denies headache, rhinorrhea, sore throat, eye pain  CV: Denies CP, palpitations  PULM: Denies wheezing, hemoptysis  GI: Denies hematemesis, hematochezia, melena  : Denies discharge, hematuria  MSK: Denies arthralgias, myalgias  SKIN: Denies rash, lesions  NEURO: weakness  PSYCH: anxiety    VITALS:  T(F): 98.2, Max: 98.2 (05-02-25 @ 04:25)  HR: 69  BP: 155/69  RR: 18Vital Signs Last 24 Hrs  T(C): 36.8 (02 May 2025 04:25), Max: 36.8 (02 May 2025 04:25)  T(F): 98.2 (02 May 2025 04:25), Max: 98.2 (02 May 2025 04:25)  HR: 69 (02 May 2025 04:25) (60 - 77)  BP: 155/69 (02 May 2025 04:25) (130/67 - 155/69)  BP(mean): 98 (02 May 2025 04:25) (88 - 98)  RR: 18 (02 May 2025 04:25) (18 - 18)  SpO2: 97% (02 May 2025 04:25) (96% - 98%)    Parameters below as of 02 May 2025 04:25  Patient On (Oxygen Delivery Method): room air        TESTS & MEASUREMENTS:                        7.8    8.01  )-----------( 262      ( 02 May 2025 06:47 )             24.7     05-01    141  |  108  |  41[H]  ----------------------------<  94  4.6   |  21  |  1.9[H]    Ca    8.5      01 May 2025 05:23  Mg     2.3     05-01    TPro  6.1  /  Alb  2.8[L]  /  TBili  0.4  /  DBili  x   /  AST  56[H]  /  ALT  38  /  AlkPhos  105  05-01    LIVER FUNCTIONS - ( 01 May 2025 05:23 )  Alb: 2.8 g/dL / Pro: 6.1 g/dL / ALK PHOS: 105 U/L / ALT: 38 U/L / AST: 56 U/L / GGT: x             Culture - Urine (collected 04-30-25 @ 13:00)  Source: Clean Catch Clean Catch (Midstream)  Final Report (05-01-25 @ 19:17):    No growth    Culture - Blood (collected 04-30-25 @ 11:13)  Source: Blood None  Preliminary Report (05-01-25 @ 23:01):    No growth at 24 hours    Culture - Blood (collected 04-29-25 @ 20:17)  Source: Blood Blood  Preliminary Report (05-02-25 @ 03:01):    No growth at 48 Hours      Urinalysis Basic - ( 01 May 2025 05:23 )    Color: x / Appearance: x / SG: x / pH: x  Gluc: 94 mg/dL / Ketone: x  / Bili: x / Urobili: x   Blood: x / Protein: x / Nitrite: x   Leuk Esterase: x / RBC: x / WBC x   Sq Epi: x / Non Sq Epi: x / Bacteria: x          Social History:  Tobacco Use: No  Alcohol Use: No  Drug Use: No    RADIOLOGY & ADDITIONAL TESTS:  Personal review of radiological diagnostics performed  Echo and EKG results noted when applicable.     MEDICATIONS:  acetaminophen     Tablet .. 650 milliGRAM(s) Oral every 6 hours PRN  aluminum hydroxide/magnesium hydroxide/simethicone Suspension 30 milliLiter(s) Oral every 4 hours PRN  chlorhexidine 2% Cloths 1 Application(s) Topical <User Schedule>  heparin   Injectable 5000 Unit(s) SubCutaneous every 8 hours  lactated ringers. 1000 milliLiter(s) IV Continuous <Continuous>  melatonin 3 milliGRAM(s) Oral at bedtime PRN  midazolam Injectable 2 milliGRAM(s) IV Push once PRN  ondansetron Injectable 4 milliGRAM(s) IV Push every 8 hours PRN  rosuvastatin 5 milliGRAM(s) Oral at bedtime      ANTIBIOTICS:

## 2025-05-02 NOTE — PROGRESS NOTE ADULT - TIME BILLING
above.  Total time spent includes but is not limited to personal review of patient chart, available results, collateral information (if necessary) and examination of patient at bedside and time spent formulating assessment and recommendations independent of teaching time.
I have personally seen and examined this patient. I have reviewed all pertinent clinical information and reviewed all relevant imaging ( and noted the impression from the Radiologist ) and diagnostic studies personally. I counseled the patient about the diagnostic testing and treatment plan. I discussed my recommendations with the primary team. Time spent teaching was excluded.

## 2025-05-02 NOTE — CONSULT NOTE ADULT - ASSESSMENT
75-year-old female past medical history of hyper tension hyperlipidemia brought in by  at bedside who is providing history for concern for UTI.  Patient found to have JAMIL on CKD on arrival with pelvic and omental masses concerning for metastatic cancer.  Patient is s/p biopsy, awaiting results. Palliative care consulted for GOC.    Spoke with patient and  at bedside, and later  outside patient's room. Patient did not have capacity and was unable to participate in exam. Palliative care introduced.  He was able to provide a medical history and hospital course. He noted that he is awaiting biopsy results and wishes to follow up outpatient with oncology to find out options prior to making further decisions about GOC.  We discussed options and he states that he would likely consider hospice if the patient doesn't have viable treatment options moving forward after seeing the outpatient oncologist. We discussed hospice at length. He doesn't wish for a consult yet, but is interested in hospice contact information to get more information. DNR/DNI confirmed.    Plan  -DNR/DNI  -ongoing medical management  -hospice information given to patient's    -plan for outpatient oncology follow up after biopsy results  -continue IVF for JAMIL and trend creatinine  -will sign off      Education about palliative care provided to patient/family.    Discussed with Dr. Ayers  Please call [phone] with questions or concerns 24/7.

## 2025-05-02 NOTE — PROGRESS NOTE ADULT - ASSESSMENT
· Assessment	  75-year-old female past medical history of hyper tension hyperlipidemia brought in by  at bedside who is providing history for concern for UTI.  Patient's been having a slowly decreased decline in her mental status 4 days ago had a home test where she was found to have a UTI and it was positive. Her PCP prescribed nitrofurantoin 210 which patient is already took 4 doses however today noticed low grade fever which is what prompted the visit.      ID consulted for diagnostic work up and antimicrobial treatment of vertebral OM    Independent history obtained from her  Jose at the bedside     IMPRESSION/RECOMMENDATIONS  Immunosuppression/Immunosenescence ( above age 60 yrs there is a exponential decline in immunity which could result in poor clinical outcomes.  Abdominal/pelvic peritoneal masses; liver mass; splenic mass; colonic mass, osseus mets  Obstruction  by pelvic mass leading to left hydronephrosis  4/29 IVR S/p Bx of RUQ mass  Infectious Diseases called for possible vertebral OM  Spiked a fever post Bx with no infectious etiology  Could well have tumor fevers  No sepsis  4/30 BCX NG  4/30 UCx NG  4/23 BCx NG  4/23 UCx NG  WBC 8.0    < from: CT Abdomen and Pelvis No Cont (04.25.25 @ 11:54) >  Lobulated pelvic masses, as above, limited without intravenous contrast.  Moderate left hydronephrosis extending into pelvis, likely obstructed by pelvic mass.  Perihepatic 6.2 x 5.0 cm irregular hypodensity/collection, limited in  evaluation without contrast.  Trace right pleural effusion with right basilar atelectasis.    < end of copied text >    < from: MR Lumbar Spine w/wo IV Cont (04.28.25 @ 20:58) >  1.  Findings consistent with widespread osseous metastatic disease  2.  No associated spinal stenosis.    < end of copied text >    hyper tension   hyperlipidemia     -Off loading to prevent pressure sores and preventive measures to avoid aspiration  -would not recommend empiric iv ABx  -no evidence of vertebral OM  -FU Bx    Discussion of management/test results( independently interpretated by me ) /antibiotic regimen  with external/primary medical team. Dr Hernández and her  Jose at the bedside

## 2025-05-02 NOTE — PROGRESS NOTE ADULT - ENMT
no gross abnormalities well developed, well nourished , in no acute distress , ambulating with walker , normal communication ability

## 2025-05-02 NOTE — PROGRESS NOTE ADULT - ASSESSMENT
75-year-old female past medical history of hyper tension hyperlipidemia brought in by  at bedside who is providing history for concern for UTI.  Patient's been having a slowly decreased decline in her mental status 4 days ago had a home test where she was found to have a UTI and it was positive. Her PCP prescribed nitrofurantoin 210 which patient is already took 4 doses however today noticed low grade fever which is what prompted the visit.  No chest pain, shortness of breath, nausea, vomiting, no flank pain. Upon further history, pt with gradual decline in mental status for months. Did not see neurologist yet. Very poor historian    #JAMIL on CKD3  #Dementia vs paraneoplastic syndrome  #Rt hydro  #Pelvic and omental masses  CT A/p w/out contrast: lobulated liver contour is consistent with cirrhotic changes. Perihepatic 6.2 x 5.0 cm irregular hypodensity/collection. Moderate left hydronephrosis extending into pelvis, likely obstructed by pelvic mass. Anterior 3.6 cm omental mass, possibly connected to adjacent lobulated pelvic mass.  - s/p Azetreonam x3   - cont IVF   -CTH NG  -NG TSH, B12, RPR, ammonia  < from: CT Abdomen and Pelvis w/ Oral Cont and w/ IV Cont (04.27.25 @ 14:47) >  Dominant left adnexal mass measuring up to 10 cm    Lobulated heterogeneous uterus/uterine remnant may reflect another   primary neoplasm.    Focal circumferential wall thickening of thesigmoid colon measuring   approximately 4.4 cm in length. It is not clear whether this reflects a   metastatic lesion or another primary.    Hepatic and splenic masses consistent with metastatic disease.    Possible L4-L5 discitis/osteomyelitis. Further evaluation with MRI may be   of benefit.    < end of copied text >  < from: US Pelvis Complete (04.26.25 @ 10:18) >  Complex pelvic masses with internal vascularity at least one of which   appears related to the left ovary and the other one may be right ovarian   or uterine in origin difficult to definitively delineate on sonogram.    < end of copied text >  < from: MR Head w/wo IV Cont (04.25.25 @ 22:17) >    No acute intracranial pathology or abnormal enhancement.  < end of copied text >  -s/p Bx on 4/29  -MRI L spine w/ and w/out contrast: multiple spine mets  -?Lt nephrostomy if Cr worsens ( is undecided and wants to wait till Bx results)    Fever on 4/30  pancultured, urine w/ mild pyuria  received IV ABx  agree w/ ID to monitor off ABx    #Anemia  - MCV WNL  - Likely CKD, malignancy related  - Iron, folate, b12, ferritin  - T+S    #HTN/HLD  - monitor; meds as appropriate    Discharge instructions discussed and  knows when to seek immediate medical attention.  Patient has proper follow up.  All results discussed and  aware they require further follow up/work up.  Stressed importance of proper follow up.  Medications prescribed and changes discussed.  All questions and concerns from patient and family addressed. Understanding of instructions verbalized.    My note supersedes the residents note should a discrepancy arise.    Chart and notes personally reviewed.  Care Discussed with Consultants/Other Providers/ Housestaff [ x] YES [ ] NO   Radiology, labs, old records personally reviewed.    discussed w/ housestaff, nursing, case management,     Time-based billing (NON-critical care).     40 minutes spent on total encounter. The necessity of the time spent during the encounter on this date of service was due to:     time spent on review of labs, imaging studies, old records, obtaining history, personally examining patient, counselling and communicating with patient/ family, entering orders for medications/tests/etc, discussions with other health care providers, documentation in electronic health records, independent interpretation of labs, imaging/procedure results and care coordination.

## 2025-05-02 NOTE — DISCHARGE NOTE PROVIDER - HOSPITAL COURSE
75-year-old female past medical history of hyper tension hyperlipidemia brought in by  at bedside who is providing history for concern for UTI.  Patient's been having a slowly decreased decline in her mental status 4 days ago had a home test where she was found to have a UTI and it was positive. Her PCP prescribed nitrofurantoin 210 which patient is already took 4 doses however today noticed low grade fever which is what prompted the visit.  No chest pain, shortness of breath, nausea, vomiting, no flank pain. Upon further history, pt with gradual decline in mental status for months. Did not see neurologist yet.    Patient admitted for further management. She was found to have pelvic and omental masses, raising suspicion for metastatic left ovarian cancer. Additionally, she has moderate left hydronephrosis, likely due to compression of the left ureter by the pelvic mass.Initial imaging via CT abdomen and pelvis without contrast showed cirrhotic liver changes and a significant perihepatic hypodensity.  CT imaging with oral and IV contrast revealed a dominant left adnexal mass up to 10 cm, possible secondary neoplasm in the uterus, and focal thickening of the sigmoid colon, raising considerations for either metastatic or primary lesions. Hepatic and splenic masses suggest further metastatic involvement. She had a biopsy by IR and results still pending. Throughout her stay, the patient experienced fevers peaking at 102 overnight, with multiple investigations leading to adjustments in antibiotic therapy, including Vancomycin and Aztreonam. she has been afebrile for the past 24 hours and is now off antibiotics. Regarding her altered mental status, there is suspicion for paraneoplastic syndrome or metabolic encephalopathy, less likely attributed to exacerbation of existing dementia, as noted by her  over the past 3-4 weeks. Neurological input suggests the presentation aligns more closely with toxic or metabolic etiologies, supported by routine EEG findings showing generalized slowing.     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 Perihepatic 6.2 x 5.0 cm irregular hypodensity/collection. Moderate left hydronephrosis extending into pelvis, likely obstructed by pelvic mass. Anterior 3.6 cm omental mass, possibly connected to adjacent lobulated pelvic mass.  - s/p Azetreonam x3 , Urine culture is negative, DC antibiotic   -  TVUS as above  -Appreciate GYN oncology, urology and interventional radiology input.    -Tumor markers were sent Ca 125 is very high   -4/26: House Staff discussed with interventional radiology, will plan to get CT abdomen pelvis with IV contrast , seems like the pelvic mass could be compressing the left ureter resulting in the moderate hydroureteronephrosis, Given that the patient presented with JAMIL that has been slowly improving, and received a contrast for MRI head yesterday, Will postpone getting CT abdomen pelvis with IV contrast till tomorrow April 27, also will plan to give IV fluid before and after the study   -4/28 - CTAPw/PO and IV con:  -- Dominant left adnexal mass measuring up to 10 cm  --Lobulated heterogeneous uterus/uterine remnant may reflect another primary neoplasm.  --Focal circumferential wall thickening of the sigmoid colon measuring approximately 4.4 cm in length. It is not clear whether this reflects a metastatic lesion or another primary.  --Hepatic and splenic masses consistent with metastatic disease.  --Possible L4-L5 discitis/osteomyelitis. Further evaluation with MRI may be of benefit.  - IR to biopsy 4/29  - MRI lumbar w w/o con -  Findings consistent with widespread osseous metastatic disease,  No associated spinal stenosis.  - fu biopsy results      #fever  - 102 overnight 4/29-4/30  - fu bcx  -a/p vanc and aztrenonam  - rvp neg  - another fever 4/30 melony  - monitor off abx as of 5/1  - afebrile for last 24 hours      #Acute change in mental status: paraneoplastic syndrome? vs metabolic encephalopathy , less likely dementia   -per  , acute change in mental status over the last 3-4 weeks  -Appreciate neurology input: Most likely presentation consistent with toxic or metabolic encephalopathy, but given the finding of the new masses paraneoplastic encephalitis on the differential  -Patient had routine EEG with generalized slowing  -MRI brain with and without contrast no acute pathology as above    #Anemia  - MCV WNL  - Likely CKD related      #HTN/HLD  - off meds    Discussion of discharge, discharge diagnoses, and med rec was conducted with Dr. Ayers and discharge was approved.

## 2025-05-02 NOTE — CONSULT NOTE ADULT - CONSULT REQUESTED DATE/TIME
26-Apr-2025 08:50
24-Apr-2025 14:55
02-May-2025 21:03
25-Apr-2025 12:49
25-Apr-2025 13:03
25-Apr-2025 16:59
30-Apr-2025 11:22

## 2025-05-02 NOTE — DISCHARGE NOTE PROVIDER - NSDCHHNEEDSERVICE_GEN_ALL_CORE
Gynecology H&P Note               HPI: Dina is a 48 year old with h/o breast cancer and abnormal uterine bleeding s/p Paragard IUD removal that presents for D&C/hysteroscopy         OB History   No obstetric history on file.     Patient Active Problem List   Diagnosis    Keloid scar    Malignant neoplasm of female breast  (CMD)    Nasal septal deviation    Opiate analgesic contract exists    Chronic pain disorder    Cervical disc disease     Past Medical History:   Diagnosis Date    Anesthesia complication     Took long to wake    Bulging lumbar disc     Cervical disc disorder     Full ROM    Malignant neoplasm  (CMD)     Breast- 5/2020    Neuropathy     Pinched nerve     PONV (postoperative nausea and vomiting)      [unfilled]   ALLERGIES:  Sulfa antibiotics, Adhesive   (environmental), and Latex  Past Surgical History:   Procedure Laterality Date    BREAST RECONSTRUCTION Left 09/2020        BREAST SURGERY  05/2020    mastectomy for breast CA      Family History   Problem Relation Age of Onset    Cancer Mother      Social History     Tobacco Use    Smoking status: Former     Current packs/day: 0.00     Types: Cigarettes     Quit date: 2014     Years since quitting: 10.7    Smokeless tobacco: Never   Substance Use Topics    Alcohol use: Never    Drug use: Never        Review of Systems   Constitutional: Negative.    HENT: Negative.     Eyes: Negative.    Respiratory: Negative.     Cardiovascular: Negative.    Gastrointestinal: Negative.    Endocrine: Negative.    Genitourinary:  Positive for menstrual problem and vaginal bleeding.   Musculoskeletal: Negative.    Skin: Negative.    Allergic/Immunologic: Negative.    Neurological: Negative.    Hematological: Negative.    Psychiatric/Behavioral: Negative.         Patient's last menstrual period was 07/18/2024.     Physical Exam  Vitals with min/max:    Vital Last Value 24 Hour Range   Temperature   No data recorded   Pulse   No data recorded   Respiratory    No data recorded   Non-Invasive  Blood Pressure   No data recorded   Pulse Oximetry   No data recorded   Arterial   Blood Pressure   No data recorded   No intake or output data in the 24 hours ending 09/29/24 1304  Body mass index is 28.7 kg/m².   Physical Exam  Constitutional:       Appearance: Normal appearance.   Genitourinary:      Genitourinary Comments: DEFERRED   HENT:      Head: Normocephalic and atraumatic.      Mouth/Throat:      Mouth: Mucous membranes are moist.      Pharynx: Oropharynx is clear.   Eyes:      Extraocular Movements: Extraocular movements intact.      Conjunctiva/sclera: Conjunctivae normal.      Pupils: Pupils are equal, round, and reactive to light.   Cardiovascular:      Pulses: Normal pulses.   Pulmonary:      Effort: Pulmonary effort is normal.      Breath sounds: Normal breath sounds.   Musculoskeletal:         General: Normal range of motion.      Cervical back: Normal range of motion.   Neurological:      General: No focal deficit present.      Mental Status: She is alert and oriented to person, place, and time. Mental status is at baseline.   Skin:     General: Skin is warm and dry.   Psychiatric:         Mood and Affect: Mood normal.         Behavior: Behavior normal.         Thought Content: Thought content normal.         Judgment: Judgment normal.   Vitals reviewed.           Study Result    Narrative & Impression   EXAM: US PELVIS NON-OB EXTERNAL TRANSABDOMINAL AND INTERNAL TRANSVAGINAL     CLINICAL INDICATION: Abnormal uterine bleeding.     COMPARISON: No prior exams are available for comparison.      TECHNIQUE: Real-time transabdominal and endovaginal ultrasound examination  of the pelvis is obtained.      FINDINGS:  The uterus is 9.1 x 4.8 x 5.6 cm.   The endometrial stripe is 14 mm in thickness.  No intrauterine device is identified.  No leiomyoma is noted on this exam.   The right ovary is 3.9 x 2.8 x 3.2 cm.   The left ovary is 3.3 x 1.1 x 1.6 cm.   No adnexal masses or  free fluid are noted in the pelvis.     IMPRESSION:  1. Endometrial stripe is 14 mm in thickness, abnormally thickened in a  postmenopausal patient.  This may be due to an endometrial malignancy.  2. No intrauterine device is identified.  3. Ovaries are normal in size.  4. The report was given by sonographer to the patient's nurse practitioner  Jeri Erickson CNP.     Electronically Signed by: DEX JOHNSON MD   Signed on: 8/26/2024 3:59 PM       Surgical Pathology: [id number]  Order: [id number]  Collected 8/26/2024 11:11       Status: Final result       Visible to patient: Yes (not seen)       Dx: Abnormal uterine bleeding (AUB); Thic...    Specimen Information: Endometrium; Tissue   2 Result Notes       1 Patient Communication       Component  Resulting Agency   Pathologic Diagnosis   \"Endometrial biopsy\"; endometrial biopsy:    -- Endometrium with breakdown changes    -- No evidence of malignancy   Electronically signed by Eladio Edgar DO on 9/3/2024 at 1624   [facility]   Order / Surgery Diagnosis:  N93.9 - Abnormal uterine bleeding (AUB) [ICD-10-CM]  R93.89 - Thickened endometrium [ICD-10-CM]     Radiology Diagnosis:  No Dx found.            Gross Description  Delia CHAVEZ.  The specimen is received in formalin with proper patient identification, labeled \"endometrial biopsy\" and consists of multiple fragments of pink-tan to red soft tissue mixed with blood clot measuring 2.6 x 1.6 x 0.2 cm in aggregate.  Sections submitted:     A1: Entire specimen in a mesh bag     Kayleigh Thomas 08/27/24 11:25 AM             WBC (K/mcL)   Date Value   04/25/2024 7.8     RBC (mil/mcL)   Date Value   04/25/2024 3.86 (L)     HCT (%)   Date Value   04/25/2024 35.5 (L)     HGB (g/dL)   Date Value   04/25/2024 11.6 (L)     PLT (K/mcL)   Date Value   04/25/2024 459 (H)       ASSESSMENT/PLAN  47 yo with h/o breast CA and abnormal uterine bleeding admitted for D&C/hysteroscopy  -R/B/A of  D&C/hysteroscopy/possible polypectomy discussed with pt. Surgical risks include bleeding, infection, uterine perforation and /or need for additional surgery. All questions addressed. Written informed consent obtained   -Antibiotics not indicated   -SCDs ordered intraoperatively         Sharon Sams MD           Rehabilitation services

## 2025-05-02 NOTE — CONSULT NOTE ADULT - CONVERSATION DETAILS
Spoke with patient and  at bedside, and later  outside patient's room. Patient did not have capacity and was unable to participate in exam. Palliative care introduced.  He was able to provide a medical history and hospital course. He noted that he is awaiting biopsy results and wishes to follow up outpatient with oncology to find out options prior to making further decisions about GOC.  We discussed options and he states that he would likely consider hospice if the patient doesn't have viable treatment options moving forward after seeing the outpatient oncologist. We discussed hospice at length. He doesn't wish for a consult yet, but is interested in hospice contact information to get more information. DNR/DNI confirmed.

## 2025-05-02 NOTE — PROGRESS NOTE ADULT - ASSESSMENT
acute kidney injury  CKD stage 3   left hydronephrosis due to pelvic mass  fever, abnl UA  left adnexal mass / heterogeneous uterus / splenic and hepatic masses / focal colon thickening   probable metastatic ovarian Ca ( very elevated)  metabolic acidosis  AMS / dementia   wt loss / poor PO intake  anemia    HTN    plan:    cx neg, no abx per ID   declines hydro intervention  cont off oral bicarb  cont off olmesartan   encourage po hydration   f/u path   f/u  / gyn-onc    d/w team and

## 2025-05-02 NOTE — PROGRESS NOTE ADULT - PROVIDER SPECIALTY LIST ADULT
Hospitalist
Nephrology
Nephrology
Hospitalist
Infectious Disease
Internal Medicine
Internal Medicine
Nephrology
Gyn Onc
Hospitalist
Internal Medicine
Intervent Radiology
Nephrology
Neurology
Hospitalist
Hospitalist
Internal Medicine
Nephrology

## 2025-05-02 NOTE — DISCHARGE NOTE PROVIDER - NSDCMRMEDTOKEN_GEN_ALL_CORE_FT
olmesartan 20 mg oral tablet: 1 tab(s) orally once a day  rosuvastatin 5 mg oral capsule: 1 cap(s) orally once a day   rosuvastatin 5 mg oral capsule: 1 cap(s) orally once a day   amLODIPine 5 mg oral tablet: 1 tab(s) orally once a day  rosuvastatin 5 mg oral capsule: 1 cap(s) orally once a day

## 2025-05-02 NOTE — DISCHARGE NOTE PROVIDER - PROVIDER TOKENS
PROVIDER:[TOKEN:[[id number]],FOLLOWUP:[2 weeks]],PROVIDER:[TOKEN:[[id number]],FOLLOWUP:[2 weeks]],PROVIDER:[TOKEN:[[id number]],FOLLOWUP:[1 week]] PROVIDER:[TOKEN:[[id number]],FOLLOWUP:[1 week]],PROVIDER:[TOKEN:[[id number]],FOLLOWUP:[1 week]],PROVIDER:[TOKEN:[[id number]],FOLLOWUP:[2 weeks]]

## 2025-05-05 ENCOUNTER — INPATIENT (INPATIENT)
Facility: HOSPITAL | Age: 75
LOS: 7 days | Discharge: HOSPICE | DRG: 755 | End: 2025-05-13
Attending: STUDENT IN AN ORGANIZED HEALTH CARE EDUCATION/TRAINING PROGRAM | Admitting: STUDENT IN AN ORGANIZED HEALTH CARE EDUCATION/TRAINING PROGRAM
Payer: MEDICARE

## 2025-05-05 VITALS
HEART RATE: 79 BPM | HEIGHT: 61 IN | TEMPERATURE: 98 F | SYSTOLIC BLOOD PRESSURE: 118 MMHG | WEIGHT: 125 LBS | DIASTOLIC BLOOD PRESSURE: 61 MMHG | OXYGEN SATURATION: 96 % | RESPIRATION RATE: 18 BRPM

## 2025-05-05 DIAGNOSIS — Z98.890 OTHER SPECIFIED POSTPROCEDURAL STATES: Chronic | ICD-10-CM

## 2025-05-05 DIAGNOSIS — R50.9 FEVER, UNSPECIFIED: ICD-10-CM

## 2025-05-05 DIAGNOSIS — Z90.49 ACQUIRED ABSENCE OF OTHER SPECIFIED PARTS OF DIGESTIVE TRACT: Chronic | ICD-10-CM

## 2025-05-05 DIAGNOSIS — Z98.42 CATARACT EXTRACTION STATUS, LEFT EYE: Chronic | ICD-10-CM

## 2025-05-05 LAB
ALBUMIN SERPL ELPH-MCNC: 2.8 G/DL — LOW (ref 3.5–5.2)
ALP SERPL-CCNC: 114 U/L — SIGNIFICANT CHANGE UP (ref 30–115)
ALT FLD-CCNC: 25 U/L — SIGNIFICANT CHANGE UP (ref 0–41)
ANION GAP SERPL CALC-SCNC: 14 MMOL/L — SIGNIFICANT CHANGE UP (ref 7–14)
AST SERPL-CCNC: 55 U/L — HIGH (ref 0–41)
BILIRUB SERPL-MCNC: 0.2 MG/DL — SIGNIFICANT CHANGE UP (ref 0.2–1.2)
BUN SERPL-MCNC: 50 MG/DL — HIGH (ref 10–20)
CALCIUM SERPL-MCNC: 8.5 MG/DL — SIGNIFICANT CHANGE UP (ref 8.4–10.5)
CHLORIDE SERPL-SCNC: 107 MMOL/L — SIGNIFICANT CHANGE UP (ref 98–110)
CO2 SERPL-SCNC: 18 MMOL/L — SIGNIFICANT CHANGE UP (ref 17–32)
CREAT SERPL-MCNC: 1.9 MG/DL — HIGH (ref 0.7–1.5)
CULTURE RESULTS: SIGNIFICANT CHANGE UP
CULTURE RESULTS: SIGNIFICANT CHANGE UP
EGFR: 27 ML/MIN/1.73M2 — LOW
EGFR: 27 ML/MIN/1.73M2 — LOW
GLUCOSE SERPL-MCNC: 219 MG/DL — HIGH (ref 70–99)
HCT VFR BLD CALC: 25.5 % — LOW (ref 37–47)
HGB BLD-MCNC: 7.9 G/DL — LOW (ref 12–16)
MCHC RBC-ENTMCNC: 28.4 PG — SIGNIFICANT CHANGE UP (ref 27–31)
MCHC RBC-ENTMCNC: 31 G/DL — LOW (ref 32–37)
MCV RBC AUTO: 91.7 FL — SIGNIFICANT CHANGE UP (ref 81–99)
NRBC BLD AUTO-RTO: 0 /100 WBCS — SIGNIFICANT CHANGE UP (ref 0–0)
PLATELET # BLD AUTO: 379 K/UL — SIGNIFICANT CHANGE UP (ref 130–400)
PMV BLD: 11.8 FL — HIGH (ref 7.4–10.4)
POTASSIUM SERPL-MCNC: 5.3 MMOL/L — HIGH (ref 3.5–5)
POTASSIUM SERPL-SCNC: 5.3 MMOL/L — HIGH (ref 3.5–5)
PROT SERPL-MCNC: 6.2 G/DL — SIGNIFICANT CHANGE UP (ref 6–8)
RBC # BLD: 2.78 M/UL — LOW (ref 4.2–5.4)
RBC # FLD: 13.6 % — SIGNIFICANT CHANGE UP (ref 11.5–14.5)
SODIUM SERPL-SCNC: 139 MMOL/L — SIGNIFICANT CHANGE UP (ref 135–146)
SPECIMEN SOURCE: SIGNIFICANT CHANGE UP
SPECIMEN SOURCE: SIGNIFICANT CHANGE UP
WBC # BLD: 9.88 K/UL — SIGNIFICANT CHANGE UP (ref 4.8–10.8)
WBC # FLD AUTO: 9.88 K/UL — SIGNIFICANT CHANGE UP (ref 4.8–10.8)

## 2025-05-05 PROCEDURE — 84145 PROCALCITONIN (PCT): CPT

## 2025-05-05 PROCEDURE — 86900 BLOOD TYPING SEROLOGIC ABO: CPT

## 2025-05-05 PROCEDURE — 71250 CT THORAX DX C-: CPT

## 2025-05-05 PROCEDURE — 81001 URINALYSIS AUTO W/SCOPE: CPT

## 2025-05-05 PROCEDURE — 80048 BASIC METABOLIC PNL TOTAL CA: CPT

## 2025-05-05 PROCEDURE — 71045 X-RAY EXAM CHEST 1 VIEW: CPT

## 2025-05-05 PROCEDURE — 86850 RBC ANTIBODY SCREEN: CPT

## 2025-05-05 PROCEDURE — 93010 ELECTROCARDIOGRAM REPORT: CPT

## 2025-05-05 PROCEDURE — 83735 ASSAY OF MAGNESIUM: CPT

## 2025-05-05 PROCEDURE — 76770 US EXAM ABDO BACK WALL COMP: CPT

## 2025-05-05 PROCEDURE — 84550 ASSAY OF BLOOD/URIC ACID: CPT

## 2025-05-05 PROCEDURE — 97162 PT EVAL MOD COMPLEX 30 MIN: CPT | Mod: GP

## 2025-05-05 PROCEDURE — 87086 URINE CULTURE/COLONY COUNT: CPT

## 2025-05-05 PROCEDURE — 87040 BLOOD CULTURE FOR BACTERIA: CPT

## 2025-05-05 PROCEDURE — 99285 EMERGENCY DEPT VISIT HI MDM: CPT

## 2025-05-05 PROCEDURE — 99223 1ST HOSP IP/OBS HIGH 75: CPT

## 2025-05-05 PROCEDURE — 87641 MR-STAPH DNA AMP PROBE: CPT

## 2025-05-05 PROCEDURE — 86901 BLOOD TYPING SEROLOGIC RH(D): CPT

## 2025-05-05 PROCEDURE — 97116 GAIT TRAINING THERAPY: CPT | Mod: GP

## 2025-05-05 PROCEDURE — 36415 COLL VENOUS BLD VENIPUNCTURE: CPT

## 2025-05-05 PROCEDURE — 0241U: CPT

## 2025-05-05 PROCEDURE — 74018 RADEX ABDOMEN 1 VIEW: CPT

## 2025-05-05 PROCEDURE — 93005 ELECTROCARDIOGRAM TRACING: CPT

## 2025-05-05 PROCEDURE — 84100 ASSAY OF PHOSPHORUS: CPT

## 2025-05-05 PROCEDURE — 85027 COMPLETE CBC AUTOMATED: CPT

## 2025-05-05 PROCEDURE — 80053 COMPREHEN METABOLIC PANEL: CPT

## 2025-05-05 PROCEDURE — 87640 STAPH A DNA AMP PROBE: CPT

## 2025-05-05 PROCEDURE — 85025 COMPLETE CBC W/AUTO DIFF WBC: CPT

## 2025-05-05 PROCEDURE — 83605 ASSAY OF LACTIC ACID: CPT

## 2025-05-05 PROCEDURE — 71045 X-RAY EXAM CHEST 1 VIEW: CPT | Mod: 26

## 2025-05-05 PROCEDURE — 85379 FIBRIN DEGRADATION QUANT: CPT

## 2025-05-05 RX ORDER — AMLODIPINE BESYLATE 10 MG/1
5 TABLET ORAL DAILY
Refills: 0 | Status: DISCONTINUED | OUTPATIENT
Start: 2025-05-05 | End: 2025-05-12

## 2025-05-05 RX ORDER — SENNA 187 MG
2 TABLET ORAL AT BEDTIME
Refills: 0 | Status: DISCONTINUED | OUTPATIENT
Start: 2025-05-05 | End: 2025-05-08

## 2025-05-05 RX ORDER — ACETAMINOPHEN 500 MG/5ML
650 LIQUID (ML) ORAL EVERY 6 HOURS
Refills: 0 | Status: DISCONTINUED | OUTPATIENT
Start: 2025-05-05 | End: 2025-05-08

## 2025-05-05 RX ORDER — SODIUM ZIRCONIUM CYCLOSILICATE 5 G/5G
5 POWDER, FOR SUSPENSION ORAL ONCE
Refills: 0 | Status: COMPLETED | OUTPATIENT
Start: 2025-05-05 | End: 2025-05-05

## 2025-05-05 RX ORDER — HEPARIN SODIUM 1000 [USP'U]/ML
5000 INJECTION INTRAVENOUS; SUBCUTANEOUS EVERY 8 HOURS
Refills: 0 | Status: DISCONTINUED | OUTPATIENT
Start: 2025-05-05 | End: 2025-05-07

## 2025-05-05 RX ORDER — POLYETHYLENE GLYCOL 3350 17 G/17G
17 POWDER, FOR SOLUTION ORAL DAILY
Refills: 0 | Status: DISCONTINUED | OUTPATIENT
Start: 2025-05-05 | End: 2025-05-08

## 2025-05-05 RX ORDER — OXYCODONE HYDROCHLORIDE 30 MG/1
5 TABLET ORAL
Refills: 0 | Status: DISCONTINUED | OUTPATIENT
Start: 2025-05-05 | End: 2025-05-08

## 2025-05-05 RX ADMIN — SODIUM ZIRCONIUM CYCLOSILICATE 5 GRAM(S): 5 POWDER, FOR SUSPENSION ORAL at 20:03

## 2025-05-05 RX ADMIN — Medication 1000 MILLILITER(S): at 18:24

## 2025-05-05 NOTE — ED ADULT TRIAGE NOTE - CHIEF COMPLAINT QUOTE
BIBEMS from home for admission for plan of care. Recently dx with liver, ovarian, kidney CA. As per EMS, pt unaware of diagnosis.  has not told her. Pt reports abdominal pain. A&OX1 to person.

## 2025-05-05 NOTE — ED PROVIDER NOTE - PHYSICAL EXAMINATION
CONSTITUTIONAL: alert, AO x 1 (tho unsure of her birthdate)   SKIN: Warm dry  HEAD: NCAT  EYES: NL inspection  ENT: dry mm  NECK: Supple; non tender.  CARD: CVS1S2, RRR  RESP: CTAB/L  ABD: soft, NT, ND  EXT: no pedal edema  NEURO: pleasantly conversant but unable to follow thread of conversation or provide accurate details about recent hospitalization though she does remember being in the hospital  PSYCH: Cooperative, appropriate.

## 2025-05-05 NOTE — H&P ADULT - TIME BILLING
60 minutes spent to complete patient's bedside assessment, review medical chart, discuss medical plan of care with covering medical team with >50% of time spent face to face with patient, discussion with patient/family and/or coordination of care. 75 minutes spent to complete patient's bedside assessment, review medical chart, discuss medical plan of care with covering medical team with >50% of time spent face to face with patient, discussion with patient/family and/or coordination of care.

## 2025-05-05 NOTE — H&P ADULT - HISTORY OF PRESENT ILLNESS
75 year old woman recently discharged a few days ago for metastatic cancer of unknown primary origin presented with her  as he is having trouble at the moment caring for her. Patient's PCP referred her to the ED because she complained of back pain but did not complain of pain to her . she is AAO times 1 at the moment. Her ROS as per  is negative. Patient has a biopsy done with IR on 4/29 for RUQ omental mass (CT-guided).  Biopsy pending    In the ED vitals WNL    labs: K :5.3 . Other labs were same as in May 2     Patient received LOKELMA 5 mg in the ED 75 year old woman recently discharged a few days ago for metastatic cancer of unknown primary origin presented with her  as he is having trouble at the moment caring for her. Patient's PCP referred her to the ED because she complained of back pain but did not complain of pain to her . she is AAO times 1-2 at the moment. Her ROS as per  is negative. Patient has a biopsy done with IR on 4/29 for RUQ omental mass (CT-guided).  Biopsy pending    In the ED vitals WNL    labs: K :5.3 . Other labs were same as in May 2     Patient received LOKELMA 5 mg in the ED

## 2025-05-05 NOTE — H&P ADULT - NSHPLABSRESULTS_GEN_ALL_CORE
7.9    9.88  )-----------( 379      ( 05 May 2025 17:15 )             25.5       05-05    139  |  107  |  50[H]  ----------------------------<  219[H]  5.3[H]   |  18  |  1.9[H]    Ca    8.5      05 May 2025 17:15    TPro  6.2  /  Alb  2.8[L]  /  TBili  0.2  /  DBili  x   /  AST  55[H]  /  ALT  25  /  AlkPhos  114  05-05              Urinalysis Basic - ( 05 May 2025 17:15 )    Color: x / Appearance: x / SG: x / pH: x  Gluc: 219 mg/dL / Ketone: x  / Bili: x / Urobili: x   Blood: x / Protein: x / Nitrite: x   Leuk Esterase: x / RBC: x / WBC x   Sq Epi: x / Non Sq Epi: x / Bacteria: x            Lactate Trend            CAPILLARY BLOOD GLUCOSE            Culture Results:   No growth (04-30 @ 13:00)  Culture Results:   No growth at 5 days (04-30 @ 11:13)  Culture Results:   No growth at 5 days (04-29 @ 20:17)  Culture Results:   No growth at 5 days (04-23 @ 23:59)  Culture Results:   No growth at 5 days (04-23 @ 23:59)  Culture Results:   <10,000 CFU/mL Normal Urogenital Willa (04-23 @ 21:04)        RADIOLOGY & ADDITIONAL TESTS:    Imaging Personally Reviewed:  [ ] YES     EKG personally reviewed [ ] YES, interpretation:     Telemetry reviewed: 7.9    9.88  )-----------( 379      ( 05 May 2025 17:15 )             25.5       05-05    139  |  107  |  50[H]  ----------------------------<  219[H]  5.3[H]   |  18  |  1.9[H]    Ca    8.5      05 May 2025 17:15    TPro  6.2  /  Alb  2.8[L]  /  TBili  0.2  /  DBili  x   /  AST  55[H]  /  ALT  25  /  AlkPhos  114  05-05          Urinalysis Basic - ( 05 May 2025 17:15 )    Color: x / Appearance: x / SG: x / pH: x  Gluc: 219 mg/dL / Ketone: x  / Bili: x / Urobili: x   Blood: x / Protein: x / Nitrite: x   Leuk Esterase: x / RBC: x / WBC x   Sq Epi: x / Non Sq Epi: x / Bacteria: x      Lactate Trend            CAPILLARY BLOOD GLUCOSE      Culture Results:   No growth (04-30 @ 13:00)  Culture Results:   No growth at 5 days (04-30 @ 11:13)  Culture Results:   No growth at 5 days (04-29 @ 20:17)  Culture Results:   No growth at 5 days (04-23 @ 23:59)  Culture Results:   No growth at 5 days (04-23 @ 23:59)  Culture Results:   <10,000 CFU/mL Normal Urogenital Willa (04-23 @ 21:04)        RADIOLOGY & ADDITIONAL TESTS:    Imaging Personally Reviewed:  [ ] YES     EKG personally reviewed [ ] YES, interpretation:     Telemetry reviewed:

## 2025-05-05 NOTE — ED PROVIDER NOTE - NS ED MD TWO NIGHTS YN
History  Chief Complaint   Patient presents with    Shortness of Breath     Worsening SOB since stent placement 6 weeks ago  Patient reports pain with deep breath  Blood tinged cough  History provided by:  Patient  Shortness of Breath  Severity:  Moderate  Onset quality:  Gradual  Duration:  6 weeks  Timing:  Constant  Progression:  Worsening  Chronicity:  New  Context: activity    Context: not URI    Relieved by:  Rest  Worsened by: Activity and exertion  Ineffective treatments:  Diuretics  Associated symptoms: chest pain, cough, hemoptysis, sputum production and wheezing    Associated symptoms: no abdominal pain, no fever, no headaches, no neck pain, no rash, no sore throat, no syncope and no vomiting       45year old M  Presents to the ED with worsening shortness of breath, bilateral chest pain and blood tinged sputum  Worsening shortness of breath for the past 6 weeks  Chest pain and blood tinged sputum started within the last 24 hours  The patient takes Coumadin  Stopped taking Lisinopril 4 days ago 2/2 cough  Not taking anything for pain  Exertion (walking the stairs) exacerbates his shortness of breath  Denies URI symptoms, fevers/chills but expresses occasional wheezing  Of note, the patient doesn't take Lasix anymore because "it doesn't help"  He has follow up with his Cardiologist this coming Thursday  Prior to Admission Medications   Prescriptions Last Dose Informant Patient Reported?  Taking?   atorvastatin (LIPITOR) 80 mg tablet   Yes No   Sig: Take 80 mg by mouth daily   clopidogrel (PLAVIX) 75 mg tablet   Yes No   Sig: Take 75 mg by mouth daily   ergocalciferol (ERGOCALCIFEROL) 1 25 MG (63819 UT) capsule   Yes No   Sig: Take 50,000 Units by mouth once a week   metoprolol succinate (TOPROL-XL) 25 mg 24 hr tablet   Yes No   Sig: Take 25 mg by mouth daily   warfarin (COUMADIN) 5 mg tablet   Yes No   Sig: Take 5 mg by mouth daily      Facility-Administered Medications: None     Past Medical History:   Diagnosis Date    Coronary artery disease     MI, old      Past Surgical History:   Procedure Laterality Date    ABDOMINAL SURGERY      CORONARY ANGIOPLASTY WITH STENT PLACEMENT  04/10/2022    x 2     History reviewed  No pertinent family history  I have reviewed and agree with the history as documented  E-Cigarette/Vaping    E-Cigarette Use Never User      E-Cigarette/Vaping Substances     Social History     Tobacco Use    Smoking status: Former Smoker     Types: Cigarettes     Quit date: 2022     Years since quittin 1    Smokeless tobacco: Never Used   Vaping Use    Vaping Use: Never used   Substance Use Topics    Alcohol use: Not Currently    Drug use: Not Currently     Review of Systems   Constitutional: Positive for activity change  Negative for appetite change, fatigue and fever  HENT: Negative for congestion, rhinorrhea, sinus pressure, sinus pain and sore throat  Respiratory: Positive for cough, hemoptysis, sputum production, shortness of breath and wheezing  Negative for chest tightness  Cardiovascular: Positive for chest pain  Negative for palpitations and syncope  Gastrointestinal: Negative for abdominal pain, diarrhea, nausea and vomiting  Genitourinary: Negative for decreased urine volume, difficulty urinating, flank pain and urgency  Musculoskeletal: Negative for back pain, myalgias, neck pain and neck stiffness  Skin: Negative for color change, pallor, rash and wound  Neurological: Negative for dizziness, syncope, light-headedness, numbness and headaches  Hematological: Bruises/bleeds easily  Psychiatric/Behavioral: Positive for sleep disturbance  Negative for confusion  All other systems reviewed and are negative  Physical Exam  Physical Exam  Vitals and nursing note reviewed  Constitutional:       General: He is not in acute distress  Appearance: He is ill-appearing  He is not toxic-appearing        Interventions: He is not intubated  Comments: Chronically ill-appearing   HENT:      Head: Normocephalic and atraumatic  Mouth/Throat:      Mouth: Mucous membranes are moist       Pharynx: Oropharynx is clear  No pharyngeal swelling or oropharyngeal exudate  Eyes:      Extraocular Movements: Extraocular movements intact  Pupils: Pupils are equal, round, and reactive to light  Cardiovascular:      Rate and Rhythm: Normal rate and regular rhythm  Pulses: Normal pulses  Heart sounds: No murmur heard  Pulmonary:      Effort: Pulmonary effort is normal  No tachypnea, accessory muscle usage or respiratory distress  He is not intubated  Breath sounds: Examination of the right-middle field reveals rales  Examination of the left-middle field reveals rales  Examination of the right-lower field reveals rales  Examination of the left-lower field reveals rales  Decreased breath sounds and rales present  Chest:      Chest wall: No tenderness  Abdominal:      General: Bowel sounds are normal       Palpations: Abdomen is soft  There is no mass  Tenderness: There is no abdominal tenderness  There is no guarding or rebound  Musculoskeletal:      Right lower leg: No tenderness  No edema  Left lower leg: No edema  Skin:     General: Skin is warm and dry  Capillary Refill: Capillary refill takes less than 2 seconds  Coloration: Skin is not cyanotic or pale  Findings: No ecchymosis, erythema or rash  Nails: There is no clubbing  Neurological:      General: No focal deficit present  Mental Status: He is alert and oriented to person, place, and time  Cranial Nerves: No cranial nerve deficit  Motor: No weakness  Psychiatric:         Mood and Affect: Mood normal  Mood is not anxious  Behavior: Behavior normal  Behavior is not agitated           Vital Signs  ED Triage Vitals [05/31/22 2211]   Temperature Pulse Respirations Blood Pressure SpO2   97 5 °F (36 4 °C) (!) 113 22 119/78 99 %      Temp Source Heart Rate Source Patient Position - Orthostatic VS BP Location FiO2 (%)   Temporal Monitor Sitting Right arm --      Pain Score       8           Vitals:    05/31/22 2211   BP: 119/78   Pulse: (!) 113   Patient Position - Orthostatic VS: Sitting     ED Medications  Medications   iohexol (OMNIPAQUE) 350 MG/ML injection (MULTI-DOSE) 70 mL (70 mL Intravenous Given 5/31/22 2336)   fentanyl citrate (PF) 100 MCG/2ML 50 mcg (50 mcg Intravenous Given 6/1/22 0106)   furosemide (LASIX) injection 20 mg (20 mg Intravenous Given 6/1/22 0100)   benzonatate (TESSALON PERLES) capsule 100 mg (100 mg Oral Given 6/1/22 0100)     Diagnostic Studies  Results Reviewed     Procedure Component Value Units Date/Time    HS Troponin I 4hr [[id number]]     Lab Status: No result Specimen: Blood     COVID/FLU/RSV [[id number]]  (Abnormal) Collected: 05/31/22 2219    Lab Status: Final result Specimen: Nares from Nose Updated: 05/31/22 2305     SARS-CoV-2 Positive     INFLUENZA A PCR Negative     INFLUENZA B PCR Negative     RSV PCR Negative    Narrative:      FOR PEDIATRIC PATIENTS - copy/paste COVID Guidelines URL to browser: [email or web address]  [email or web address]    SARS-CoV-2 assay is a Nucleic Acid Amplification assay intended for the  qualitative detection of nucleic acid from SARS-CoV-2 in nasopharyngeal  swabs  Results are for the presumptive identification of SARS-CoV-2 RNA  Positive results are indicative of infection with SARS-CoV-2, the virus  causing COVID-19, but do not rule out bacterial infection or co-infection  with other viruses  Laboratories within the [place] and its  territories are required to report all positive results to the appropriate  public health authorities  Negative results do not preclude SARS-CoV-2  infection and should not be used as the sole basis for treatment or other  patient management decisions   Negative results must be combined with  clinical observations, patient history, and epidemiological information  This test has not been FDA cleared or approved  This test has been authorized by FDA under an Emergency Use Authorization  (EUA)  This test is only authorized for the duration of time the  declaration that circumstances exist justifying the authorization of the  emergency use of an in vitro diagnostic tests for detection of SARS-CoV-2  virus and/or diagnosis of COVID-19 infection under section 564(b)(1) of  the Act, 21 U  S C  071FDT-1(P)(6), unless the authorization is terminated  or revoked sooner  The test has been validated but independent review by FDA  and CLIA is pending  Test performed using [employer] GeneXpert: This RT-PCR assay targets N2,  a region unique to SARS-CoV-2  A conserved region in the E-gene was chosen  for pan-Sarbecovirus detection which includes SARS-CoV-2  D-dimer, quantitative [[id number]]  (Abnormal) Collected: 05/31/22 2216    Lab Status: Final result Specimen: Blood from Arm, Left Updated: 05/31/22 2257     D-Dimer, Quant 2 12 ug/ml FEU     Narrative: In the evaluation for possible pulmonary embolism, in the appropriate (Well's Score of 4 or less) patient, the age adjusted d-dimer cutoff for this patient can be calculated as:    Age x 0 01 (in ug/mL) for Age-adjusted D-dimer exclusion threshold for a patient over 50 years      Protime-INR [[id number]]  (Abnormal) Collected: 05/31/22 2216    Lab Status: Final result Specimen: Blood from Arm, Left Updated: 05/31/22 2257     Protime 27 8 seconds      INR 2 68    APTT [[id number]]  (Abnormal) Collected: 05/31/22 2216    Lab Status: Final result Specimen: Blood from Arm, Left Updated: 05/31/22 2257     PTT 49 seconds     HS Troponin I 2hr [[id number]]     Lab Status: No result Specimen: Blood     HS Troponin 0hr (reflex protocol) [[id number]]  (Normal) Collected: 05/31/22 2216    Lab Status: Final result Specimen: Blood from Arm, Left Updated: 05/31/22 2249 hs TnI 0hr 23 ng/L     Comprehensive metabolic panel [[id number]]  (Abnormal) Collected: 05/31/22 2216    Lab Status: Final result Specimen: Blood from Arm, Left Updated: 05/31/22 2249     Sodium 136 mmol/L      Potassium 3 4 mmol/L      Chloride 104 mmol/L      CO2 21 mmol/L      ANION GAP 11 mmol/L      BUN 13 mg/dL      Creatinine 0 87 mg/dL      Glucose 103 mg/dL      Calcium 8 7 mg/dL      Corrected Calcium 9 6 mg/dL      AST 17 U/L      ALT 27 U/L      Alkaline Phosphatase 101 U/L      Total Protein 6 4 g/dL      Albumin 2 9 g/dL      Total Bilirubin 0 49 mg/dL      eGFR 97 ml/min/1 73sq m     Narrative:      National Kidney Disease Foundation guidelines for Chronic Kidney Disease (CKD):     Stage 1 with normal or high GFR (GFR > 90 mL/min/1 73 square meters)    Stage 2 Mild CKD (GFR = 60-89 mL/min/1 73 square meters)    Stage 3A Moderate CKD (GFR = 45-59 mL/min/1 73 square meters)    Stage 3B Moderate CKD (GFR = 30-44 mL/min/1 73 square meters)    Stage 4 Severe CKD (GFR = 15-29 mL/min/1 73 square meters)    Stage 5 End Stage CKD (GFR <15 mL/min/1 73 square meters)  Note: GFR calculation is accurate only with a steady state creatinine    NT-BNP PRO [[id number]]  (Abnormal) Collected: 05/31/22 2216    Lab Status: Final result Specimen: Blood from Arm, Left Updated: 05/31/22 2249     NT-proBNP 3,002 pg/mL     CBC and differential [[id number]]  (Abnormal) Collected: 05/31/22 2216    Lab Status: Final result Specimen: Blood from Arm, Left Updated: 05/31/22 2224     WBC 6 98 Thousand/uL      RBC 3 84 Million/uL      Hemoglobin 10 7 g/dL      Hematocrit 32 7 %      MCV 85 fL      MCH 27 9 pg      MCHC 32 7 g/dL      RDW 14 6 %      MPV 10 4 fL      Platelets 459 Thousands/uL      nRBC 0 /100 WBCs      Neutrophils Relative 58 %      Immat GRANS % 0 %      Lymphocytes Relative 33 %      Monocytes Relative 7 %      Eosinophils Relative 1 %      Basophils Relative 1 %      Neutrophils Absolute 4 09 Thousands/µL Immature Grans Absolute 0 02 Thousand/uL      Lymphocytes Absolute 2 28 Thousands/µL      Monocytes Absolute 0 48 Thousand/µL      Eosinophils Absolute 0 07 Thousand/µL      Basophils Absolute 0 04 Thousands/µL              CTA ED chest PE Study   Final Result by Stephanie Kline DO (06/01 0019)      No evidence of pulmonary artery embolus  Diffuse airspace opacities within the lungs which may represent COVID  infection  Follow-up posttreatment in 3 months is recommended to evaluate for resolution      Mediastinal and hilar lymphadenopathy  Interlobular septal thickening which may represent pulmonary edema      The study was marked in EPIC for immediate notification  Workstation performed: [id number]         XR chest 1 view portable    (Results Pending)          Procedures  ECG 12 Lead Documentation Only    Date/Time: 5/31/2022 10:12 PM  Performed by: Lucy Rodriguez DO  Authorized by: Lucy Rodriguez DO     Indications / Diagnosis:  Shortness of breath  ECG reviewed by me, the ED Provider: yes    Patient location:  ED  Previous ECG:     Previous ECG:  Unavailable  Interpretation:     Interpretation: non-specific    Rate:     ECG rate:  108    ECG rate assessment: tachycardic    Rhythm:     Rhythm: sinus tachycardia    Ectopy:     Ectopy: none    QRS:     QRS axis:  Normal    QRS intervals:  Normal  Conduction:     Conduction: normal    ST segments:     ST segments:  Non-specific    Depression:  III and aVF  T waves:     T waves: peaked      Peaked:  V6, III and aVF         ED Course       MDM     57-year-old male  Presents to the emergency department with hemoptysis, bilateral chest pain, shortness of breath, cough  Having worsening shortness of breath x 6 weeks  Chest pain/hemoptysis started within the last 24 hours  ECG with nonspecific inferolateral changes  Therapeutic INR on Coumadin  Given the patient's aforementioned symptoms, D-dimer was obtained elevated    CTA showing signs of COVID pneumonia and pulmonary edema  Respiratory viral panel + COVID-19  The patient was administered a dose of IV Lasix 20 mg and prescribed a course of PO Lasix 40 mg  He has f/u with his Cardiologist this coming Thursday  Tessalon Perles prescribed for cough  SpO2 99% on RA without signs of distress  Strict return precautions were discussed with the patient  He expressed understanding  All questions were addressed  The patient was stable for discharge  Disposition  Final diagnoses:   Acute on chronic systolic heart failure ([facility] [address] )   Pneumonia due to COVID-19 virus     Time reflects when diagnosis was documented in both MDM as applicable and the Disposition within this note     Time User Action Codes Description Comment    6/1/2022 12:53 AM Tess Kessler Add [I50 23] Acute on chronic systolic heart failure ([facility] [address] )     6/1/2022 12:53 AM Kelly Vaz Add [U07 1,  J12 82] Pneumonia due to COVID-19 virus       ED Disposition     ED Disposition   Discharge    Condition   Stable    Date/Time   Wed Jun 1, 2022 12:53 AM    Comment   Nicole Hamilton discharge to home/self care                 Follow-up Information    None         Discharge Medication List as of 6/1/2022 12:55 AM      START taking these medications    Details   benzonatate (TESSALON PERLES) 100 mg capsule Take 1 capsule (100 mg total) by mouth every 8 (eight) hours, Starting Wed 6/1/2022, Normal      furosemide (LASIX) 40 mg tablet Take 1 tablet (40 mg total) by mouth daily for 5 days, Starting Wed 6/1/2022, Until Mon 6/6/2022, Normal      potassium chloride (K-DUR,KLOR-CON) 20 mEq tablet Take 1 tablet (20 mEq total) by mouth 2 (two) times a day, Starting Wed 6/1/2022, Normal         CONTINUE these medications which have NOT CHANGED    Details   atorvastatin (LIPITOR) 80 mg tablet Take 80 mg by mouth daily, Starting Tue 4/12/2022, Historical Med      clopidogrel (PLAVIX) 75 mg tablet Take 75 mg by mouth daily, Starting Wed 4/13/2022, Historical Med ergocalciferol (ERGOCALCIFEROL) 1 25 MG (59860 UT) capsule Take 50,000 Units by mouth once a week, Starting Sun 4/17/2022, Until Sun 6/12/2022, Historical Med      metoprolol succinate (TOPROL-XL) 25 mg 24 hr tablet Take 25 mg by mouth daily, Starting Wed 4/13/2022, Historical Med      warfarin (COUMADIN) 5 mg tablet Take 5 mg by mouth daily, Starting Tue 4/12/2022, Historical Med             No discharge procedures on file      PDMP Review     None          ED Provider  Electronically Signed by           Lucy Rodriguez DO  06/01/22 6199 Yes

## 2025-05-05 NOTE — H&P ADULT - ASSESSMENT
75 year old woman recently discharged a few days ago for metastatic cancer of unknown primary origin presented with her  as he is having trouble at the moment caring for her. Patient's PCP referred her to the ED because she complained of back pain but did not complain of pain to her     #metastatic cancer of unknown primary origin s/p CT guided biopsy of RUQ omental mass  #left adnexal mass / heterogeneous uterus / splenic and hepatic masses / focal colon thickening   probable metastatic ovarian Ca ( very elevated)  #CKD stage 3 . Left hydronephrosis due to pelvic mass  - In the ED vitals WNL  -labs: K :5.3 . Other labs were same as in May 2   -Patient received LOKELMA 5 mg in the ED  -  CT A/p w/out contrast in April: lobulated liver contour is consistent with cirrhotic changes. Perihepatic 6.2 x 5.0 cm irregular hypodensity/collection. Moderate left hydronephrosis extending into pelvis, likely obstructed by pelvic mass. Anterior 3.6 cm omental mass, possibly connected to adjacent lobulated pelvic mass.  - MR spine in April: Multiple varying sized lesions demonstrated throughout the osseous structures consistent with metastatic disease. The lesions range from punctiform, iliac and sacral bones and larger lesions which is 2.5 and 3   cm (left iliac bone, T12).None of the lesions are associated with soft tissue masses orspinal stenosis.There are multilevel degenerative changes. Mild bulging L5-S1 with foraminal encroachment.Loss of disc space height at L4-L5 mild destructive changes may be previous infection but there is no evidence for edema or enhancement suggest any active infection.No spinal stenosis. Conus is unremarkable.The osseous lesions enhance. Enhancement pattern is otherwise   unremarkable.Adnexal mass partially included on the study.  - Will put patient on tylenol for prn for mild pain and oxycodone 5 mg for moderate/severe pain  - bowel regimen  - f/u biopsy  - Consult hem/onc once biopsy is out  - Goals of care discussed with .  reaffirmed DNR/DNI.    #DNR/DNI  #DVT prophyalxis: Heparin sub q  75 year old woman recently discharged a few days ago for metastatic cancer of unknown primary origin presented with her  as he is having trouble at the moment caring for her. Patient's PCP referred her to the ED because she complained of back pain but did not complain of pain to her     #metastatic cancer of unknown primary origin s/p CT guided biopsy of RUQ omental mass  #left adnexal mass / heterogeneous uterus / splenic and hepatic masses / focal colon thickening   probable metastatic ovarian Ca ( very elevated)  #CKD stage 3 . Left hydronephrosis due to pelvic mass  - In the ED vitals WNL  -labs: K :5.3 . Other labs were same as in May 2   -Patient received LOKELMA 5 mg in the ED  -  CT A/p w/out contrast in April: lobulated liver contour is consistent with cirrhotic changes. Perihepatic 6.2 x 5.0 cm irregular hypodensity/collection. Moderate left hydronephrosis extending into pelvis, likely obstructed by pelvic mass. Anterior 3.6 cm omental mass, possibly connected to adjacent lobulated pelvic mass.  - MR spine in April: Multiple varying sized lesions demonstrated throughout the osseous structures consistent with metastatic disease. The lesions range from punctiform, iliac and sacral bones and larger lesions which is 2.5 and 3   cm (left iliac bone, T12).None of the lesions are associated with soft tissue masses or spinal stenosis. There are multilevel degenerative changes. Mild bulging L5-S1 with foraminal encroachment. Loss of disc space height at L4-L5 mild destructive changes may be previous infection but there is no evidence for edema or enhancement suggest any active infection. No spinal stenosis. Conus is unremarkable. The osseous lesions enhance. Enhancement pattern is otherwise   unremarkable. Adnexal mass partially included on the study.  - Will put patient on tylenol for prn for mild pain and oxycodone 5 mg for moderate/severe pain  - bowel regimen  - f/u biopsy  - Consult hem/onc once biopsy is out  - Goals of care discussed with .  reaffirmed DNR/DNI.    #Placement  - reports that he can not take of care at home  -Case management eval for placement.  -PT eval    #DNR/DNI  #DVT prophyalxis: Heparin sub q   #DIET: Regular 75 year old woman recently discharged a few days ago for metastatic cancer of unknown primary origin presented with her  as he is having trouble at the moment caring for her. Patient's PCP referred her to the ED because she complained of back pain but did not complain of pain to her     #Cognitive decline  -Dementia vs paraneoplastic syndrome  -c/w home meds  -mental status stable from last admission    #metastatic cancer of unknown primary origin s/p CT guided biopsy of RUQ omental mass  #left adnexal mass / heterogeneous uterus / splenic and hepatic masses / focal colon thickening   probable metastatic ovarian Ca ( very elevated)  #CKD stage 3 . Left hydronephrosis due to pelvic mass  - In the ED vitals WNL  -labs: K :5.3 . Other labs were same as in May 2   -Patient received LOKELMA 5 mg in the ED  -  CT A/p w/out contrast in April: lobulated liver contour is consistent with cirrhotic changes. Perihepatic 6.2 x 5.0 cm irregular hypodensity/collection. Moderate left hydronephrosis extending into pelvis, likely obstructed by pelvic mass. Anterior 3.6 cm omental mass, possibly connected to adjacent lobulated pelvic mass.  - MR spine in April: Multiple varying sized lesions demonstrated throughout the osseous structures consistent with metastatic disease. The lesions range from punctiform, iliac and sacral bones and larger lesions which is 2.5 and 3   cm (left iliac bone, T12).None of the lesions are associated with soft tissue masses or spinal stenosis. There are multilevel degenerative changes. Mild bulging L5-S1 with foraminal encroachment. Loss of disc space height at L4-L5 mild destructive changes may be previous infection but there is no evidence for edema or enhancement suggest any active infection. No spinal stenosis. Conus is unremarkable. The osseous lesions enhance. Enhancement pattern is otherwise   unremarkable. Adnexal mass partially included on the study.  - Will put patient on tylenol for prn for mild pain and oxycodone 5 mg for moderate/severe pain  - bowel regimen  - f/u biopsy  - Consult hem/onc once biopsy is out  - Goals of care discussed with .  reaffirmed DNR/DNI.    #Placement  - reports that he can not take of care at home  -Case management eval for placement.  -PT eval    #Elevated creatinine --> stable from last admission. may be new baseline  #Hyperkalemia - s/p lokelma. monitor repeat level, may need to change to renal diet if persistent hyperkalemia  - monitor cr. and lytes    #Anemia  -hgb stable from last admission  -low TSAT and TIBC + elevated ferritin in favour of anemia of chronic disease  -B12 and folate wnl  -consider po iron  -trend h/h, Tx PRN    #HTN / HLD  -on amlodipine and rosuvastatin  -c/w home meds  -monitor BP    #DNR/DNI  #DVT prophyalxis: Heparin sub q   #DIET: Regular 75 year old woman recently discharged a few days ago for metastatic cancer of unknown primary origin presented with her  as he is having trouble at the moment caring for her. Patient's PCP referred her to the ED because she complained of back pain but did not complain of pain to her       #Immunodeficiency in the setting setting of advanced age, CKD, suspected metastatic malignancy among other co-morbidities.    #Cognitive decline  -Dementia vs paraneoplastic syndrome  -c/w home meds  -mental status stable from last admission    #metastatic cancer of unknown primary origin s/p CT guided biopsy of RUQ omental mass  #left adnexal mass / heterogeneous uterus / splenic and hepatic masses / focal colon thickening   probable metastatic ovarian Ca ( very elevated)  #CKD stage 3 . Left hydronephrosis due to pelvic mass  - In the ED vitals WNL  -labs: K :5.3 . Other labs were same as in May 2   -Patient received LOKELMA 5 mg in the ED  -  CT A/p w/out contrast in April: lobulated liver contour is consistent with cirrhotic changes. Perihepatic 6.2 x 5.0 cm irregular hypodensity/collection. Moderate left hydronephrosis extending into pelvis, likely obstructed by pelvic mass. Anterior 3.6 cm omental mass, possibly connected to adjacent lobulated pelvic mass.  - MR spine in April: Multiple varying sized lesions demonstrated throughout the osseous structures consistent with metastatic disease. The lesions range from punctiform, iliac and sacral bones and larger lesions which is 2.5 and 3   cm (left iliac bone, T12).None of the lesions are associated with soft tissue masses or spinal stenosis. There are multilevel degenerative changes. Mild bulging L5-S1 with foraminal encroachment. Loss of disc space height at L4-L5 mild destructive changes may be previous infection but there is no evidence for edema or enhancement suggest any active infection. No spinal stenosis. Conus is unremarkable. The osseous lesions enhance. Enhancement pattern is otherwise   unremarkable. Adnexal mass partially included on the study.  -Will put patient on tylenol for prn for mild pain and oxycodone 5 mg for moderate/severe pain  -bowel regimen  -f/u biopsy  -Consult hem/onc once biopsy is out  -Goals of care discussed with .  reaffirmed DNR/DNI.    #Placement  - reports that he can not take of care at home  -Case management eval for placement.  -PT eval    #Elevated creatinine --> stable from last admission. may be new baseline  #Hyperkalemia - s/p lokelma. monitor repeat level, may need to change to renal diet if persistent hyperkalemia  #low bicarb + corrected AG 17 --> HAGMA:  start po bicarb 325 q 8 hr  -monitor cr. and lytes    #Anemia  -hgb stable from last admission  -low TSAT and TIBC + elevated ferritin in favour of anemia of chronic disease  -B12 and folate wnl  -consider po iron  -trend h/h, Tx PRN    #HTN / HLD  -on amlodipine and rosuvastatin  -c/w home meds  -monitor BP    #DNR/DNI  #DVT prophyalxis: Heparin sub q   #DIET: Regular

## 2025-05-05 NOTE — ED PROVIDER NOTE - OBJECTIVE STATEMENT
74yo woman, h/o HTN, HLD BIB  due to confusion and poor PO intake at home. Pt was discharged from [facility] 5/2 after admission for multiple, likely neoplastic abdominal masses with bony spinal metastatis. Bx was done, primary could be GI vs GYN ovarian vs uterine, no results yet. Pt was spiking temps in the hospital but negative cx thus far; she was discharged after the bx was done because she was anxious and wanted to go home, but  is having trouble caring for her. They live at home without local family, and he does not feel safe leaving her at all because she is ambulatory and confused. Additionally, she continues to be febrile and home and barely eating/drinking, though she denies complaints.

## 2025-05-05 NOTE — ED PROVIDER NOTE - CLINICAL SUMMARY MEDICAL DECISION MAKING FREE TEXT BOX
Labs with JAMIL slightly worse than discharge, K is 5.3, no EKG changes. CXR with small pleural effusion similar to prior CT scan. Spoke with PMD Dr Uriostegui, knows pt very well, agrees that  is overwhelmed and recommended admission. Pt and  amenable.

## 2025-05-05 NOTE — ED ADULT TRIAGE NOTE - WEIGHT IN KG
3/7- transferred to [facility] med. /80  VSS.  K 5.3  HD is MWF. Will give lokema.  Pt wants to be discharged. Repeat lab. Will dc  to NH. HD planned tomorrow (WED) . Repeat K 4.8. has HD chair.  Will    56.7

## 2025-05-05 NOTE — ED PROVIDER NOTE - INPATIENT RECORD SUMMARY
Pt with extensive metastatic w/u inpt, no bx results yet. Palliative was consulted but pt's  not ready to make decisions (other than DNR/DNI) until results are back and he understands options available.

## 2025-05-05 NOTE — H&P ADULT - ATTENDING COMMENTS
Patient seen on 05/05/25.      75 year old woman recently discharged a few days ago for metastatic cancer of unknown primary origin presented with her  as he is having trouble at the moment caring for her. Patient's PCP referred her to the ED because she complained of back pain but did not complain of pain to her     #metastatic cancer of unknown primary origin s/p CT guided biopsy of RUQ omental mass  #left adnexal mass / heterogeneous uterus / splenic and hepatic masses / focal colon thickening   probable metastatic ovarian Ca ( very elevated)  #CKD stage 3 . Left hydronephrosis due to pelvic mass  - In the ED vitals WNL  -labs: K :5.3 . Other labs were same as in May 2   -Patient received LOKELMA 5 mg in the ED  -  CT A/p w/out contrast in April: lobulated liver contour is consistent with cirrhotic changes. Perihepatic 6.2 x 5.0 cm irregular hypodensity/collection. Moderate left hydronephrosis extending into pelvis, likely obstructed by pelvic mass. Anterior 3.6 cm omental mass, possibly connected to adjacent lobulated pelvic mass.  - MR spine in April: Multiple varying sized lesions demonstrated throughout the osseous structures consistent with metastatic disease. The lesions range from punctiform, iliac and sacral bones and larger lesions which is 2.5 and 3   cm (left iliac bone, T12).None of the lesions are associated with soft tissue masses or spinal stenosis. There are multilevel degenerative changes. Mild bulging L5-S1 with foraminal encroachment. Loss of disc space height at L4-L5 mild destructive changes may be previous infection but there is no evidence for edema or enhancement suggest any active infection. No spinal stenosis. Conus is unremarkable. The osseous lesions enhance. Enhancement pattern is otherwise   unremarkable. Adnexal mass partially included on the study.  - Will put patient on tylenol for prn for mild pain and oxycodone 5 mg for moderate/severe pain  - bowel regimen  - f/u biopsy  - Consult hem/onc once biopsy is out  - Goals of care discussed with .  reaffirmed DNR/DNI.    #Placement  - reports that he can not take of care at home  -Case management eval for placement.  -PT eval    #DNR/DNI  #DVT prophyalxis: Heparin sub q   #DIET: Regular Patient seen on 05/05/25.      75 year old woman recently discharged a few days ago for metastatic cancer of unknown primary origin presented with her  as he is having trouble at the moment caring for her. Patient's PCP referred her to the ED because she complained of back pain but did not complain of pain to her     #Cognitive decline  -Dementia vs paraneoplastic syndrome  -c/w home meds  -mental status stable from last admission    #metastatic cancer of unknown primary origin s/p CT guided biopsy of RUQ omental mass  #left adnexal mass / heterogeneous uterus / splenic and hepatic masses / focal colon thickening   probable metastatic ovarian Ca ( very elevated)  #CKD stage 3 . Left hydronephrosis due to pelvic mass  - In the ED vitals WNL  -labs: K :5.3 . Other labs were same as in May 2   -Patient received LOKELMA 5 mg in the ED  -  CT A/p w/out contrast in April: lobulated liver contour is consistent with cirrhotic changes. Perihepatic 6.2 x 5.0 cm irregular hypodensity/collection. Moderate left hydronephrosis extending into pelvis, likely obstructed by pelvic mass. Anterior 3.6 cm omental mass, possibly connected to adjacent lobulated pelvic mass.  - MR spine in April: Multiple varying sized lesions demonstrated throughout the osseous structures consistent with metastatic disease. The lesions range from punctiform, iliac and sacral bones and larger lesions which is 2.5 and 3   cm (left iliac bone, T12).None of the lesions are associated with soft tissue masses or spinal stenosis. There are multilevel degenerative changes. Mild bulging L5-S1 with foraminal encroachment. Loss of disc space height at L4-L5 mild destructive changes may be previous infection but there is no evidence for edema or enhancement suggest any active infection. No spinal stenosis. Conus is unremarkable. The osseous lesions enhance. Enhancement pattern is otherwise   unremarkable. Adnexal mass partially included on the study.  - Will put patient on tylenol for prn for mild pain and oxycodone 5 mg for moderate/severe pain  - bowel regimen  - f/u biopsy  - Consult hem/onc once biopsy is out  - Goals of care discussed with .  reaffirmed DNR/DNI.    #Placement  - reports that he can not take of care at home  -Case management eval for placement.  -PT eval    #Elevated creatinine --> stable from last admission. may be new baseline  #Hyperkalemia - s/p lokelma. monitor repeat level, may need to change to renal diet if persistent hyperkalemia  - monitor cr. and lytes    #Anemia  -hgb stable from last admission  -low TSAT and TIBC + elevated ferritin in favour of anemia of chronic disease  -B12 and folate wnl  -consider po iron  -trend h/h, Tx PRN    #HTN / HLD  -on amlodipine and rosuvastatin  -c/w home meds  -monitor BP    #DNR/DNI  #DVT prophyalxis: Heparin sub q   #DIET: Regular Patient seen on 05/05/25.      75 year old woman recently discharged a few days ago for metastatic cancer of unknown primary origin presented with her  as he is having trouble at the moment caring for her. Patient's PCP referred her to the ED because she complained of back pain but did not complain of pain to her       #Immunodeficiency in the setting setting of advanced age, CKD, suspected metastatic malignancy among other co-morbidities.    #Cognitive decline  -Dementia vs paraneoplastic syndrome  -c/w home meds  -mental status stable from last admission    #metastatic cancer of unknown primary origin s/p CT guided biopsy of RUQ omental mass  #left adnexal mass / heterogeneous uterus / splenic and hepatic masses / focal colon thickening   probable metastatic ovarian Ca ( very elevated)  #CKD stage 3 . Left hydronephrosis due to pelvic mass  - In the ED vitals WNL  -labs: K :5.3 . Other labs were same as in May 2   -Patient received LOKELMA 5 mg in the ED  -  CT A/p w/out contrast in April: lobulated liver contour is consistent with cirrhotic changes. Perihepatic 6.2 x 5.0 cm irregular hypodensity/collection. Moderate left hydronephrosis extending into pelvis, likely obstructed by pelvic mass. Anterior 3.6 cm omental mass, possibly connected to adjacent lobulated pelvic mass.  - MR spine in April: Multiple varying sized lesions demonstrated throughout the osseous structures consistent with metastatic disease. The lesions range from punctiform, iliac and sacral bones and larger lesions which is 2.5 and 3   cm (left iliac bone, T12).None of the lesions are associated with soft tissue masses or spinal stenosis. There are multilevel degenerative changes. Mild bulging L5-S1 with foraminal encroachment. Loss of disc space height at L4-L5 mild destructive changes may be previous infection but there is no evidence for edema or enhancement suggest any active infection. No spinal stenosis. Conus is unremarkable. The osseous lesions enhance. Enhancement pattern is otherwise   unremarkable. Adnexal mass partially included on the study.  -Will put patient on tylenol for prn for mild pain and oxycodone 5 mg for moderate/severe pain  -bowel regimen  -f/u biopsy  -Consult hem/onc once biopsy is out  -Goals of care discussed with .  reaffirmed DNR/DNI.    #Placement  - reports that he can not take of care at home  -Case management eval for placement.  -PT eval    #Elevated creatinine --> stable from last admission. may be new baseline  #Hyperkalemia - s/p lokelma. monitor repeat level, may need to change to renal diet if persistent hyperkalemia  #low bicarb + corrected AG 17 --> HAGMA:  start po bicarb 325 q 8 hr  -monitor cr. and lytes    #Anemia  -hgb stable from last admission  -low TSAT and TIBC + elevated ferritin in favour of anemia of chronic disease  -B12 and folate wnl  -consider po iron  -trend h/h, Tx PRN    #HTN / HLD  -on amlodipine and rosuvastatin  -c/w home meds  -monitor BP    #DNR/DNI  #DVT prophyalxis: Heparin sub q   #DIET: Regular

## 2025-05-05 NOTE — H&P ADULT - NSHPPHYSICALEXAM_GEN_ALL_CORE
T(C): 36.5 (05-05-25 @ 16:06), Max: 36.5 (05-05-25 @ 16:06)  HR: 79 (05-05-25 @ 16:06) (79 - 79)  BP: 118/61 (05-05-25 @ 16:06) (118/61 - 118/61)  RR: 18 (05-05-25 @ 16:06) (18 - 18)  SpO2: 96% (05-05-25 @ 16:06) (96% - 96%)    CONSTITUTIONAL:  no apparent distress. Patient is AAO times 1 to person only  EYES: PERRLA and symmetric, EOMI, No conjunctival or scleral injection, non-icteric  ENMT: Oral mucosa with moist membranes. Normal dentition; no pharyngeal injection or exudates             NECK: Supple, symmetric and without tracheal deviation   RESP: No respiratory distress, no use of accessory muscles; CTA b/l, no WRR  CV: RRR, +S1S2, no MRG; no JVD; no peripheral edema  GI: Soft, NT, ND, no rebound, no guarding; no palpable masses; no hepatosplenomegaly; no hernia palpated T(C): 36.5 (25 @ 16:06), Max: 36.5 (25 @ 16:06)  HR: 79 (25 @ 16:06) (79 - 79)  BP: 118/61 (25 @ 16:06) (118/61 - 118/61)  RR: 18 (25 @ 16:06) (18 - 18)  SpO2: 96% (25 @ 16:06) (96% - 96%)    CONSTITUTIONAL:  no apparent distress.   EYES: pale conjunctiva  RESP: No respiratory distress, no use of accessory muscles; CTA b/l, no WRR  CV: RRR, +S1S2, no MRG; no JVD; no peripheral edema  GI: Soft, NT, ND, no rebound, no guarding; no palpable masses; no hepatosplenomegaly; no hernia palpated  NEURO: Patient is AAO times 1-2 to person + also knew her . did not answer place and current day/year.  grossly non-focal.

## 2025-05-06 DIAGNOSIS — Z51.5 ENCOUNTER FOR PALLIATIVE CARE: ICD-10-CM

## 2025-05-06 DIAGNOSIS — N17.9 ACUTE KIDNEY FAILURE, UNSPECIFIED: ICD-10-CM

## 2025-05-06 DIAGNOSIS — R41.82 ALTERED MENTAL STATUS, UNSPECIFIED: ICD-10-CM

## 2025-05-06 DIAGNOSIS — C80.1 MALIGNANT (PRIMARY) NEOPLASM, UNSPECIFIED: ICD-10-CM

## 2025-05-06 LAB
ANION GAP SERPL CALC-SCNC: 10 MMOL/L — SIGNIFICANT CHANGE UP (ref 7–14)
BUN SERPL-MCNC: 45 MG/DL — HIGH (ref 10–20)
CALCIUM SERPL-MCNC: 8.4 MG/DL — SIGNIFICANT CHANGE UP (ref 8.4–10.5)
CHLORIDE SERPL-SCNC: 113 MMOL/L — HIGH (ref 98–110)
CO2 SERPL-SCNC: 19 MMOL/L — SIGNIFICANT CHANGE UP (ref 17–32)
CREAT SERPL-MCNC: 1.7 MG/DL — HIGH (ref 0.7–1.5)
EGFR: 31 ML/MIN/1.73M2 — LOW
EGFR: 31 ML/MIN/1.73M2 — LOW
GLUCOSE SERPL-MCNC: 94 MG/DL — SIGNIFICANT CHANGE UP (ref 70–99)
NON-GYNECOLOGICAL CYTOLOGY STUDY: SIGNIFICANT CHANGE UP
POTASSIUM SERPL-MCNC: 5.2 MMOL/L — HIGH (ref 3.5–5)
POTASSIUM SERPL-SCNC: 5.2 MMOL/L — HIGH (ref 3.5–5)
SODIUM SERPL-SCNC: 142 MMOL/L — SIGNIFICANT CHANGE UP (ref 135–146)

## 2025-05-06 PROCEDURE — 99233 SBSQ HOSP IP/OBS HIGH 50: CPT | Mod: GC

## 2025-05-06 PROCEDURE — 99222 1ST HOSP IP/OBS MODERATE 55: CPT

## 2025-05-06 RX ORDER — SODIUM BICARBONATE 1 MEQ/ML
325 SYRINGE (ML) INTRAVENOUS EVERY 8 HOURS
Refills: 0 | Status: DISCONTINUED | OUTPATIENT
Start: 2025-05-06 | End: 2025-05-12

## 2025-05-06 RX ORDER — ROSUVASTATIN CALCIUM 20 MG/1
5 TABLET, FILM COATED ORAL AT BEDTIME
Refills: 0 | Status: DISCONTINUED | OUTPATIENT
Start: 2025-05-06 | End: 2025-05-12

## 2025-05-06 RX ORDER — SODIUM ZIRCONIUM CYCLOSILICATE 5 G/5G
5 POWDER, FOR SUSPENSION ORAL ONCE
Refills: 0 | Status: COMPLETED | OUTPATIENT
Start: 2025-05-06 | End: 2025-05-06

## 2025-05-06 RX ADMIN — HEPARIN SODIUM 5000 UNIT(S): 1000 INJECTION INTRAVENOUS; SUBCUTANEOUS at 05:59

## 2025-05-06 RX ADMIN — SODIUM ZIRCONIUM CYCLOSILICATE 5 GRAM(S): 5 POWDER, FOR SUSPENSION ORAL at 12:19

## 2025-05-06 RX ADMIN — HEPARIN SODIUM 5000 UNIT(S): 1000 INJECTION INTRAVENOUS; SUBCUTANEOUS at 21:43

## 2025-05-06 RX ADMIN — Medication 650 MILLIGRAM(S): at 01:36

## 2025-05-06 RX ADMIN — AMLODIPINE BESYLATE 5 MILLIGRAM(S): 10 TABLET ORAL at 05:59

## 2025-05-06 RX ADMIN — Medication 325 MILLIGRAM(S): at 21:43

## 2025-05-06 RX ADMIN — ROSUVASTATIN CALCIUM 5 MILLIGRAM(S): 20 TABLET, FILM COATED ORAL at 21:45

## 2025-05-06 NOTE — PHYSICAL THERAPY INITIAL EVALUATION ADULT - GAIT DISTANCE, PT EVAL
pt with 1 anterior LOB, 2 lateral LOB, required Juliocesar to correct, LOB due to decreased step length/step height, poor safety awareness, easily distracted/200 feet

## 2025-05-06 NOTE — PATIENT PROFILE ADULT - FALL HARM RISK - HARM RISK INTERVENTIONS
Assistance with ambulation/Assistance OOB with selected safe patient handling equipment/Communicate Risk of Fall with Harm to all staff/Discuss with provider need for PT consult/Monitor gait and stability/Provide patient with walking aids - walker, cane, crutches/Reorient to person, place and time as needed/Review medications for side effects contributing to fall risk/Tailored Fall Risk Interventions/Use of alarms - bed, chair and/or voice tab/Visual Cue: Yellow wristband and red socks/Bed in lowest position, wheels locked, appropriate side rails in place/Call bell, personal items and telephone in reach/Instruct patient to call for assistance before getting out of bed or chair/Non-slip footwear when patient is out of bed/[place] to call system/Physically safe environment - no spills, clutter or unnecessary equipment/Purposeful Proactive Rounding/Room/bathroom lighting operational, light cord in reach

## 2025-05-06 NOTE — PHYSICAL THERAPY INITIAL EVALUATION ADULT - LEVEL OF INDEPENDENCE: TOILET, REHAB EVAL
inc time for pericare, requires verbal cues for motor planning and appropriate sequencing/contact guard

## 2025-05-06 NOTE — CONSULT NOTE ADULT - ASSESSMENT
75 year old woman recently discharged a few days ago for metastatic cancer of unknown primary origin presented with her  as he is having trouble at the moment caring for her. Patient reportedly had pain as outpatient and was sent to ED by outside physician. Patient with no pain presently. Palliative care consulted at request of patient's .     Patient seen at bedside with . Patient denied any symptoms.  Palliative care re-introduced. Patient's  noted that the patient was sent in because of pain, but the patient denies any pain today.  Patient's  noted that they are still waiting on biopsy results and treatment options before making additional decisions about GOC.  He had questions about hospice and palliative care. All questions answered.  Patient is DNR/DNI.     Plan  -DNR/DNI  -ongoing medical management  -IVF/bicarb per renal  -follow up biopsy results and treatment options  -patient is currently asymptomatic  -will sign off for now, please call back PRN    Education about palliative care provided to patient/family.  Discussed with Dr. Wilson    Please call [phone] with questions or concerns 24/7.

## 2025-05-06 NOTE — PHYSICAL THERAPY INITIAL EVALUATION ADULT - PERTINENT HX OF CURRENT PROBLEM, REHAB EVAL
75 year old woman recently discharged a few days ago for metastatic cancer of unknown primary origin presented with her  as he is having trouble at the moment caring for her. Patient's PCP referred her to the ED because she complained of back pain but did not complain of pain to her . she is AAO times 1-2 at the moment. Her ROS as per  is negative. Patient has a biopsy done with IR on 4/29 for RUQ omental mass (CT-guided).  Biopsy pending

## 2025-05-06 NOTE — CONSULT NOTE ADULT - SUBJECTIVE AND OBJECTIVE BOX
NEPHROLOGY CONSULTATION NOTE    75 year old woman recently discharged a few days ago for metastatic cancer of unknown primary origin presented with her  as he is having trouble at the moment caring for her. Patient's PCP referred her to the ED because she complained of back pain but did not complain of pain to her . she is AAO times 1-2 at the moment. Her ROS as per  is negative. Patient has a biopsy done with IR on 4/29 for RUQ omental mass (CT-guided).  Biopsy pending    In the ED vitals WNL    labs: K :5.3 . Other labs were same as in May 2     Patient received LOKELMA 5 mg in the ED      PAST MEDICAL & SURGICAL HISTORY:  HTN (hypertension)      HLD (hyperlipidemia)      Cataract      S/P liudmila      H/O lumpectomy  left      History of dilation and curettage      Status post cataract extraction and insertion of intraocular lens, left        Allergies:  latex (Rash)  penicillin (Unknown)    Home Medications Reviewed    SOCIAL HISTORY:  Denies ETOH,Smoking,   FAMILY HISTORY:        REVIEW OF SYSTEMS:  All other review of systems is negative unless indicated above.    PHYSICAL EXAM:  Constitutional: NAD, frail   HEENT: anicteric sclera, oropharynx clear, dry MM  Neck: No JVD  Respiratory: CTAB, no wheezes, rales or rhonchi  Cardiovascular: S1, S2, RRR  Gastrointestinal: BS+, soft, NT/ND  Extremities: No cyanosis or clubbing. trace peripheral edema  Neurological: pleasantly confused   : No CVA tenderness. No jo.   Skin: No rashes     Hospital Medications:   MEDICATIONS  (STANDING):  amLODIPine   Tablet 5 milliGRAM(s) Oral daily  heparin   Injectable 5000 Unit(s) SubCutaneous every 8 hours  polyethylene glycol 3350 17 Gram(s) Oral daily  rosuvastatin 5 milliGRAM(s) Oral at bedtime  sodium zirconium cyclosilicate 5 Gram(s) Oral once        VITALS:  T(F): 98.1 (05-06-25 @ 07:54), Max: 99.1 (05-06-25 @ 00:43)  HR: 73 (05-06-25 @ 07:54)  BP: 122/58 (05-06-25 @ 07:54)  RR: 18 (05-06-25 @ 07:54)  SpO2: 98% (05-06-25 @ 07:54)  Wt(kg): --    Height (cm): 154.9 (05-05 @ 16:06)  Weight (kg): 56.7 (05-05 @ 16:06)  BMI (kg/m2): 23.6 (05-05 @ 16:06)  BSA (m2): 1.55 (05-05 @ 16:06)    LABS:  05-06    142  |  113[H]  |  45[H]  ----------------------------<  94  5.2[H]   |  19  |  1.7[H]    Ca    8.4      06 May 2025 07:29    TPro  6.2  /  Alb  2.8[L]  /  TBili  0.2  /  DBili      /  AST  55[H]  /  ALT  25  /  AlkPhos  114  05-05                          7.9    9.88  )-----------( 379      ( 05 May 2025 17:15 )             25.5       Urine Studies:  Urinalysis Basic - ( 06 May 2025 07:29 )    Color:  / Appearance:  / SG:  / pH:   Gluc: 94 mg/dL / Ketone:   / Bili:  / Urobili:    Blood:  / Protein:  / Nitrite:    Leuk Esterase:  / RBC:  / WBC    Sq Epi:  / Non Sq Epi:  / Bacteria:           RADIOLOGY & ADDITIONAL STUDIES:

## 2025-05-06 NOTE — ED ADULT NURSE NOTE - NSFALLRISKINTERV_ED_ALL_ED
Assistance OOB with selected safe patient handling equipment if applicable/Assistance with ambulation/Communicate fall risk and risk factors to all staff, patient, and family/Monitor gait and stability/Monitor for mental status changes and reorient to person, place, and time, as needed/Provide visual cue: yellow wristband, yellow gown, etc/Reinforce activity limits and safety measures with patient and family/Toileting schedule using arm’s reach rule for commode and bathroom/Use of alarms - bed, stretcher, chair and/or video monitoring/Call bell, personal items and telephone in reach/Instruct patient to call for assistance before getting out of bed/chair/stretcher/Non-slip footwear applied when patient is off stretcher/[place] to call system/Physically safe environment - no spills, clutter or unnecessary equipment/Purposeful Proactive Rounding/Room/bathroom lighting operational, light cord in reach

## 2025-05-06 NOTE — PROGRESS NOTE ADULT - ASSESSMENT
75 year old woman recently discharged a few days ago for metastatic cancer of unknown primary origin presented with her  as he is having trouble at the moment caring for her. Patient's PCP referred her to the ED because she complained of back pain but did not complain of pain to her       #Immunodeficiency in the setting setting of advanced age, CKD, suspected metastatic malignancy among other co-morbidities.    #Cognitive decline  -Dementia vs paraneoplastic syndrome  -c/w home meds  -mental status stable from last admission    #metastatic cancer of unknown primary origin s/p CT guided biopsy of RUQ omental mass  #left adnexal mass / heterogeneous uterus / splenic and hepatic masses / focal colon thickening   probable metastatic ovarian Ca ( very elevated)  #CKD stage 3 . Left hydronephrosis due to pelvic mass  - vitals WNL  -labs: K :5.3  Other labs were same as in May 2   -Patient received LOKELMA 5 mg in the ED  -  CT A/p w/out contrast in April: lobulated liver contour is consistent with cirrhotic changes. Perihepatic 6.2 x 5.0 cm irregular hypodensity/collection. Moderate left hydronephrosis extending into pelvis, likely obstructed by pelvic mass. Anterior 3.6 cm omental mass, possibly connected to adjacent lobulated pelvic mass.  - MR spine in April: Multiple varying sized lesions demonstrated throughout the osseous structures consistent with metastatic disease. The lesions range from punctiform, iliac and sacral bones and larger lesions which is 2.5 and 3   cm (left iliac bone, T12).None of the lesions are associated with soft tissue masses or spinal stenosis. There are multilevel degenerative changes. Mild bulging L5-S1 with foraminal encroachment. Loss of disc space height at L4-L5 mild destructive changes may be previous infection but there is no evidence for edema or enhancement suggest any active infection. No spinal stenosis. Conus is unremarkable. The osseous lesions enhance. Enhancement pattern is otherwise   unremarkable. Adnexal mass partially included on the study.  -Will put patient on tylenol for prn for mild pain and oxycodone 5 mg for moderate/severe pain  -bowel regimen  -f/u biopsy from 4/29  -Consult hem/onc once biopsy is out  -Goals of care discussed with .  reaffirmed DNR/DNI  - palliative following    #Placement  - reports that he can not take of care at home  -Case management eval for placement  -PT eval    #Elevated creatinine --> stable from last admission. may be new baseline  #Hyperkalemia - s/p lokelma. monitor repeat level, may need to change to renal diet if persistent hyperkalemia  #low bicarb + corrected AG 17 --> HAGMA:  start po bicarb 325 q 8 hr  -monitor cr. and lytes  -AG improved 10    #Anemia  -hgb stable from last admission  -low TSAT and TIBC + elevated ferritin in favour of anemia of chronic disease  -B12 and folate wnl  -consider po iron  -trend h/h, Tx PRN    #HTN / HLD  -on amlodipine and rosuvastatin  -c/w home meds  -monitor BP    #DNR/DNI  #DVT prophyalxis: Heparin sub q   #DIET: Regular

## 2025-05-06 NOTE — PHYSICAL THERAPY INITIAL EVALUATION ADULT - ADDITIONAL COMMENTS
Pt lives with  in multi-story home, 5+5+6+5 steps, remains on 1st floor with 2 steps to enter. Pt ambulates independently, no hx of falls, requires assistance with ADLs since last admission.

## 2025-05-06 NOTE — PATIENT PROFILE ADULT - FUNCTIONAL SCREEN CURRENT LEVEL: COMMUNICATION, MLM
Patient/Caregiver provided printed discharge information. 2 = difficulty understanding (not related to language barrier)

## 2025-05-06 NOTE — CONSULT NOTE ADULT - ASSESSMENT
CKD stage $ (Cr based GFR overestimating true renal function)  hyperkalemia, mild  metabolic acidosis, mild   left hydronephrosis due to pelvic mass  left adnexal mass / heterogeneous uterus / splenic and hepatic masses / focal colon thickening / bone mets  probable metastatic ovarian Ca ( very elevated)  dementia   wt loss / poor PO intake  anemia    HTN    plan:    low K+ diet  lokelma PRN  start oral sodium bicarbonate   1L NS given  can give gentle maintenance IVF  encourage PO hydration  may need to dc amlodipine as DBP's low   previously declined  intervention (PCN vs ureteral stent) for left hydro until path returns and determination of definitive management of malignancy  check urine culture  obtain repeat renal sono if flank pain, s/s of UTI or worsening renal function   f/u  / gyn-onc    d/w  at bedside

## 2025-05-06 NOTE — CONSULT NOTE ADULT - SUBJECTIVE AND OBJECTIVE BOX
CC: difficulty caring for the patient    HPI:  75 year old woman recently discharged a few days ago for metastatic cancer of unknown primary origin presented with her  as he is having trouble at the moment caring for her. Patient's PCP referred her to the ED because she complained of back pain but did not complain of pain to her . she is AAO times 1-2 at the moment. Her ROS as per  is negative. Patient has a biopsy done with IR on 4/29 for RUQ omental mass (CT-guided).  Biopsy pending    In the ED vitals WNL    labs: K :5.3 . Other labs were same as in May 2     Patient received LOKELMA 5 mg in the ED (05 May 2025 23:29)    PERTINENT PM/SXH:   HTN (hypertension)    HLD (hyperlipidemia)    Cataract      S/P liudmila    H/O lumpectomy    History of dilation and curettage    Status post cataract extraction and insertion of intraocular lens, left      FAMILY HISTORY:    None pertinent    ITEMS NOT CHECKED ARE NOT PRESENT    SOCIAL HISTORY:   Significant other/partner[ ]  Children[ ]  Hindu/Spirituality:  Substance hx:  [ ]   Tobacco hx:  [ ]   Alcohol hx: [ ]   Living Situation: [ x]Home  [ ]Long term care  [ ]Rehab [ ]Other  Home Services: [ ] HHA [ ] Fabricio RN [ ] Hospice  Occupation:  Home Opioid hx:  [ ] Y [ ] N [ x] I-Stop Reference No:    Reference #: [id number] - no meds     ADVANCE DIRECTIVES:     [ ] Full Code [x ] DNR  MOLST  [ ]  Living Will  [ ]   DECISION MAKER(s):  [ ] Health Care Proxy(s)  [ x] Surrogate(s)  [ ] Guardian           Name(s): Phone Number(s):   Jose      BASELINE (I)ADL(s) (prior to admission):    [place]: [ ]Total  [ ] Moderate [ ]Dependent  Palliative Performance Status Version 2:         %    http://npcrc.org/files/news/palliative_performance_scale_ppsv2.pdf    Allergies    latex (Rash)  penicillin (Unknown)    Intolerances    MEDICATIONS  (STANDING):  amLODIPine   Tablet 5 milliGRAM(s) Oral daily  heparin   Injectable 5000 Unit(s) SubCutaneous every 8 hours  polyethylene glycol 3350 17 Gram(s) Oral daily  rosuvastatin 5 milliGRAM(s) Oral at bedtime    MEDICATIONS  (PRN):  acetaminophen     Tablet .. 650 milliGRAM(s) Oral every 6 hours PRN Mild Pain (1 - 3)  oxyCODONE    IR 5 milliGRAM(s) Oral four times a day PRN Moderate Pain (4 - 6)  senna 2 Tablet(s) Oral at bedtime PRN Constipation    PRESENT SYMPTOMS: [ ]Unable to obtain due to poor mentation   Source if other than patient:  [ ]Family   [ ]Team     Pain: [ ]yes [x ]no  ALL PAIN ASSESSMENT COMPONENTS WERE ASKED ABOUT UNLESS OTHERWISE NOTED  QOL impact -   Location -                    Aggravating factors -  Quality -  Radiation -  Timing-  Severity (0-10 scale):  Minimal acceptable level (0-10 scale):     CPOT:    https://www.[email or web address]/getattachment/[email or web address]/Critical-Care-Pain-Observation-Tool-(CPOT)    PAIN AD Score:   http://geriatrictoolkit.[email or web address]/painad.pdf (press ctrl +  left click to view)    Dyspnea:                           [x ]None[ ]Mild [ ]Moderate [ ]Severe     Respiratory Distress Observation Scale (RDOS):   A score of 0 to 2 signifies little or no respiratory distress, 3 signifies mild distress, scores 4 to 6 indicate moderate distress, and scores greater than 7 signify severe distress  [email or web address]://[email or web address]/sites/default/files/PDFS/175272-cvsdkdcknpp-bwkxmfgu-oebhohcywdz-bbbnv.pdf    Anxiety:                             [ ]None[ ]Mild [ ]Moderate [ ]Severe   Fatigue:                             [ ]None[ ]Mild [ ]Moderate [ ]Severe   Nausea:                             [ x]None[ ]Mild [ ]Moderate [ ]Severe   Loss of appetite:              [ ]None[ ]Mild [ ]Moderate [ ]Severe   Constipation:                    [ ]None[ ]Mild [ ]Moderate [ ]Severe    Other Symptoms:  [x ]All other review of systems negative     Palliative Performance Status Version 2:         30%    http://npcrc.org/files/news/palliative_performance_scale_ppsv2.pdf    PHYSICAL EXAM:  Vital Signs Last 24 Hrs  T(C): 36.7 (06 May 2025 07:54), Max: 37.3 (06 May 2025 00:43)  T(F): 98.1 (06 May 2025 07:54), Max: 99.1 (06 May 2025 00:43)  HR: 73 (06 May 2025 07:54) (73 - 80)  BP: 122/58 (06 May 2025 07:54) (118/61 - 133/68)  BP(mean): --  RR: 18 (06 May 2025 07:54) (18 - 19)  SpO2: 98% (06 May 2025 07:54) (96% - 98%)    Parameters below as of 06 May 2025 07:54  Patient On (Oxygen Delivery Method): room air     I&O's Summary      GENERAL:  [ ] No acute distress [ ]Lethargic  [ ]Unarousable  [ x]Verbal  [ ]Non-Verbal [ ]Cachexia    BEHAVIORAL/PSYCH:  [ ]Alert and Oriented x  [ ] Anxiety [ ] Delirium [ ] Agitation [ ] Calm   EYES: [ ] No scleral icterus [ ] Scleral icterus [ ] Closed  ENMT:  [ ]Dry mouth  [ x]No external oral lesions [ ] No external ear or nose lesions  CARDIOVASCULAR:  [ ]Regular [ ]Irregular [ ]Tachy [ ]Not Tachy  [ ]Gavino [ ] Edema [ ] No edema  PULMONARY:  [ ]Tachypnea  [ ]Audible excessive secretions [ x] No labored breathing [ ] labored breathing  GASTROINTESTINAL: [ ]Soft  [ ]Distended  [ ]Not distended [ ]Non tender [ ]Tender  MUSCULOSKELETAL: [ ]No clubbing [ ] clubbing  [ ] No cyanosis [ ] cyanosis  NEUROLOGIC: [ ]No focal deficits  [ x]Follows commands  [ ]Does not follow commands  [ x]Cognitive impairment  [ ]Dysphagia  [ ]Dysarthria  [ ]Paresis   SKIN: [ ] Jaundiced [ ] Non-jaundiced [ ]Rash [ ]No Rash [ ] Warm [ ] Dry  MISC/LINES: [ ] ET tube [ ] Trach [ ]NGT/OGT [ ]PEG [ ][place]    LABS: Interpreted by me                        7.9    9.88  )-----------( 379      ( 05 May 2025 17:15 )             25.5   05-06    142  |  113[H]  |  45[H]  ----------------------------<  94  5.2[H]   |  19  |  1.7[H]    Ca    8.4      06 May 2025 07:29    TPro  6.2  /  Alb  2.8[L]  /  TBili  0.2  /  DBili  x   /  AST  55[H]  /  ALT  25  /  AlkPhos  114  05-05      Urinalysis Basic - ( 06 May 2025 07:29 )    Color: x / Appearance: x / SG: x / pH: x  Gluc: 94 mg/dL / Ketone: x  / Bili: x / Urobili: x   Blood: x / Protein: x / Nitrite: x   Leuk Esterase: x / RBC: x / WBC x   Sq Epi: x / Non Sq Epi: x / Bacteria: x      RADIOLOGY & ADDITIONAL STUDIES: Interpreted by me    < from: Xray Chest 1 View AP/PA (05.05.25 @ 18:20) >  Right basilar opacity/atelectasis.    < end of copied text >      EKG: Interpreted by me    < from: 12 Lead ECG (05.05.25 @ 18:06) >  Ventricular Rate 70 BPM    Atrial Rate 70 BPM    P-R Interval 172 ms    QRS Duration 104 ms    Q-T Interval 402 ms    QTC Calculation(Bazett) 434 ms    P Axis 36 degrees    R Axis -39 degrees    T Axis 3 degrees    Diagnosis Line Normal sinus rhythm  Left axis deviation  Moderate voltage criteria for LVH, may be normal variant ( R in aVL , Soy  product )  Abnormal ECG    < end of copied text >      PROTEIN CALORIE MALNUTRITION PRESENT: [ ]mild [ ]moderate [ ]severe [ ]underweight [ ]morbid obesity  https://www.andeal.org/vault/2440/web/files/ONC/Table_Clinical%20Characteristics%20to%20Document%20Malnutrition-White%20JV%20et%20al%202012.pdf    Height (cm): 154.9 (05-05-25 @ 16:06), 154.9 (04-29-25 @ 08:00)  Weight (kg): 56.7 (05-05-25 @ 16:06), 74.8 (04-29-25 @ 08:00)  BMI (kg/m2): 23.6 (05-05-25 @ 16:06), 31.2 (04-29-25 @ 08:00)  [ ]PPSV2 < or = to 30% [ ]significant weight loss  [ ]poor nutritional intake  [ ]anasarca      [ ]Artificial Nutrition      Palliative Care Spiritual/Emotional Screening Tool Question  Severity (0-4):                    OR                    [ x] Unable to determine. Will assess at later time if appropriate.  Score of 2 or greater indicates recommendation of Chaplaincy and/or SW referral  Chaplaincy Referral: [ ] Yes [ ] Refused [ ] Following     Caregiver Luck:  [ ] Yes [ ] No    OR    [x ] Unable to determine. Will assess at later time if appropriate.  Social Work Referral [ ]  Patient and Family Centered Care Referral [ ]    Anticipatory Grief Present: [ ] Yes [ ] No    OR     [ x] Unable to determine. Will assess at later time if appropriate.  Social Work Referral [ ]  Patient and Family Centered Care Referral [ ]    Patient discussed with primary medical team MD  Palliative care education provided to patient and/or family

## 2025-05-06 NOTE — GOALS OF CARE CONVERSATION - ADVANCED CARE PLANNING - CONVERSATION DETAILS
Pt had prior admission MOSLT filled out with no attending signature or second witness.    Completed the new MOLST form.   is the decision maker.   Updated current medical conditions.   The  expressed understanding.  He wants DNR/DNI with trial of NIV. He wants to continue with current medical condition with focus no pain control.    Follow up: pending attending signature in the morning. Pt had prior admission MOSLT filled out with no attending signature or second witness.    Completed the new MOLST form.   is the decision maker.   Updated current medical conditions.   The  expressed understanding.  He wants DNR/DNI with trial of NIV. He wants to continue with current medical condition with focus on pain control.

## 2025-05-06 NOTE — PHYSICAL THERAPY INITIAL EVALUATION ADULT - IMPAIRMENTS FOUND, PT EVAL
arousal, attention, and cognition/gait, locomotion, and balance/gross motor/muscle strength/poor safety awareness

## 2025-05-06 NOTE — PROGRESS NOTE ADULT - SUBJECTIVE AND OBJECTIVE BOX
SUBJECTIVE/OVERNIGHT EVENTS  Today is hospital day 1d. This morning patient was seen and examined at bedside, resting comfortably in bed. No acute or major events overnight.    MEDICATIONS  STANDING MEDICATIONS  amLODIPine   Tablet 5 milliGRAM(s) Oral daily  heparin   Injectable 5000 Unit(s) SubCutaneous every 8 hours  polyethylene glycol 3350 17 Gram(s) Oral daily  rosuvastatin 5 milliGRAM(s) Oral at bedtime    PRN MEDICATIONS  acetaminophen     Tablet .. 650 milliGRAM(s) Oral every 6 hours PRN  oxyCODONE    IR 5 milliGRAM(s) Oral four times a day PRN  senna 2 Tablet(s) Oral at bedtime PRN    VITALS  T(F): 98.1 (05-06-25 @ 07:54), Max: 99.1 (05-06-25 @ 00:43)  HR: 73 (05-06-25 @ 07:54) (73 - 80)  BP: 122/58 (05-06-25 @ 07:54) (118/61 - 133/68)  RR: 18 (05-06-25 @ 07:54) (18 - 19)  SpO2: 98% (05-06-25 @ 07:54) (96% - 98%)    PHYSICAL EXAM  GENERAL: NAD, resting comfortably in bed  HEENT: NCAT  NECK: supple  HEART: RRR  LUNGS: CTAB  ABDOMEN: soft, nontender  EXT: no LE edema  NEURO: AOx1-2    LABS             7.9    9.88  )-----------( 379      ( 05-05-25 @ 17:15 )             25.5     142  |  113  |  45  -------------------------<  94   05-06-25 @ 07:29  5.2  |  19  |  1.7    Ca      8.4     05-06-25 @ 07:29    TPro  6.2  /  Alb  2.8  /  TBili  0.2  /  DBili  x   /  AST  55  /  ALT  25  /  AlkPhos  114  /  GGT  x     05-05-25 @ 17:15        Urinalysis Basic - ( 06 May 2025 07:29 )    Color: x / Appearance: x / SG: x / pH: x  Gluc: 94 mg/dL / Ketone: x  / Bili: x / Urobili: x   Blood: x / Protein: x / Nitrite: x   Leuk Esterase: x / RBC: x / WBC x   Sq Epi: x / Non Sq Epi: x / Bacteria: x          IMAGING

## 2025-05-06 NOTE — CONSULT NOTE ADULT - CONVERSATION DETAILS
Patient seen at bedside with . Patient denied any symptoms.  Palliative care re-introduced. Patient's  noted that the patient was sent in because of pain, but the patient denies any pain today.  Patient's  noted that they are still waiting on biopsy results and treatment options before making additional decisions about GOC.  He had questions about hospice and palliative care. All questions answered.  Patient is DNR/DNI.

## 2025-05-07 LAB
ANION GAP SERPL CALC-SCNC: 11 MMOL/L — SIGNIFICANT CHANGE UP (ref 7–14)
APPEARANCE UR: CLEAR — SIGNIFICANT CHANGE UP
BACTERIA # UR AUTO: NEGATIVE /HPF — SIGNIFICANT CHANGE UP
BILIRUB UR-MCNC: NEGATIVE — SIGNIFICANT CHANGE UP
BUN SERPL-MCNC: 39 MG/DL — HIGH (ref 10–20)
CALCIUM SERPL-MCNC: 8.4 MG/DL — SIGNIFICANT CHANGE UP (ref 8.4–10.5)
CAST: 1 /LPF — SIGNIFICANT CHANGE UP (ref 0–4)
CHLORIDE SERPL-SCNC: 108 MMOL/L — SIGNIFICANT CHANGE UP (ref 98–110)
CO2 SERPL-SCNC: 20 MMOL/L — SIGNIFICANT CHANGE UP (ref 17–32)
COLOR SPEC: YELLOW — SIGNIFICANT CHANGE UP
CREAT SERPL-MCNC: 1.6 MG/DL — HIGH (ref 0.7–1.5)
DIFF PNL FLD: NEGATIVE — SIGNIFICANT CHANGE UP
EGFR: 33 ML/MIN/1.73M2 — LOW
EGFR: 33 ML/MIN/1.73M2 — LOW
GLUCOSE SERPL-MCNC: 96 MG/DL — SIGNIFICANT CHANGE UP (ref 70–99)
GLUCOSE UR QL: NEGATIVE MG/DL — SIGNIFICANT CHANGE UP
HCT VFR BLD CALC: 22.6 % — LOW (ref 37–47)
HCT VFR BLD CALC: 22.7 % — LOW (ref 37–47)
HGB BLD-MCNC: 7 G/DL — LOW (ref 12–16)
HGB BLD-MCNC: 7 G/DL — LOW (ref 12–16)
KETONES UR-MCNC: NEGATIVE MG/DL — SIGNIFICANT CHANGE UP
LEUKOCYTE ESTERASE UR-ACNC: ABNORMAL
MCHC RBC-ENTMCNC: 27.9 PG — SIGNIFICANT CHANGE UP (ref 27–31)
MCHC RBC-ENTMCNC: 28.3 PG — SIGNIFICANT CHANGE UP (ref 27–31)
MCHC RBC-ENTMCNC: 30.8 G/DL — LOW (ref 32–37)
MCHC RBC-ENTMCNC: 31 G/DL — LOW (ref 32–37)
MCV RBC AUTO: 90 FL — SIGNIFICANT CHANGE UP (ref 81–99)
MCV RBC AUTO: 91.9 FL — SIGNIFICANT CHANGE UP (ref 81–99)
NITRITE UR-MCNC: NEGATIVE — SIGNIFICANT CHANGE UP
NRBC BLD AUTO-RTO: 0 /100 WBCS — SIGNIFICANT CHANGE UP (ref 0–0)
NRBC BLD AUTO-RTO: 0 /100 WBCS — SIGNIFICANT CHANGE UP (ref 0–0)
PH UR: 5.5 — SIGNIFICANT CHANGE UP (ref 5–8)
PLATELET # BLD AUTO: 347 K/UL — SIGNIFICANT CHANGE UP (ref 130–400)
PLATELET # BLD AUTO: 362 K/UL — SIGNIFICANT CHANGE UP (ref 130–400)
PMV BLD: 11.5 FL — HIGH (ref 7.4–10.4)
PMV BLD: 11.9 FL — HIGH (ref 7.4–10.4)
POTASSIUM SERPL-MCNC: 5 MMOL/L — SIGNIFICANT CHANGE UP (ref 3.5–5)
POTASSIUM SERPL-SCNC: 5 MMOL/L — SIGNIFICANT CHANGE UP (ref 3.5–5)
PROT UR-MCNC: 100 MG/DL
RBC # BLD: 2.47 M/UL — LOW (ref 4.2–5.4)
RBC # BLD: 2.51 M/UL — LOW (ref 4.2–5.4)
RBC # FLD: 14 % — SIGNIFICANT CHANGE UP (ref 11.5–14.5)
RBC # FLD: 14.3 % — SIGNIFICANT CHANGE UP (ref 11.5–14.5)
RBC CASTS # UR COMP ASSIST: 2 /HPF — SIGNIFICANT CHANGE UP (ref 0–4)
SODIUM SERPL-SCNC: 139 MMOL/L — SIGNIFICANT CHANGE UP (ref 135–146)
SP GR SPEC: 1.02 — SIGNIFICANT CHANGE UP (ref 1–1.03)
SQUAMOUS # UR AUTO: 4 /HPF — SIGNIFICANT CHANGE UP (ref 0–5)
UROBILINOGEN FLD QL: 1 MG/DL — SIGNIFICANT CHANGE UP (ref 0.2–1)
WBC # BLD: 8.2 K/UL — SIGNIFICANT CHANGE UP (ref 4.8–10.8)
WBC # BLD: 8.58 K/UL — SIGNIFICANT CHANGE UP (ref 4.8–10.8)
WBC # FLD AUTO: 8.2 K/UL — SIGNIFICANT CHANGE UP (ref 4.8–10.8)
WBC # FLD AUTO: 8.58 K/UL — SIGNIFICANT CHANGE UP (ref 4.8–10.8)
WBC UR QL: 28 /HPF — HIGH (ref 0–5)

## 2025-05-07 PROCEDURE — 99223 1ST HOSP IP/OBS HIGH 75: CPT

## 2025-05-07 PROCEDURE — 99232 SBSQ HOSP IP/OBS MODERATE 35: CPT

## 2025-05-07 PROCEDURE — 71250 CT THORAX DX C-: CPT | Mod: 26

## 2025-05-07 PROCEDURE — 99221 1ST HOSP IP/OBS SF/LOW 40: CPT

## 2025-05-07 RX ADMIN — HEPARIN SODIUM 5000 UNIT(S): 1000 INJECTION INTRAVENOUS; SUBCUTANEOUS at 05:43

## 2025-05-07 RX ADMIN — AMLODIPINE BESYLATE 5 MILLIGRAM(S): 10 TABLET ORAL at 05:41

## 2025-05-07 RX ADMIN — Medication 325 MILLIGRAM(S): at 13:02

## 2025-05-07 RX ADMIN — Medication 650 MILLIGRAM(S): at 22:12

## 2025-05-07 RX ADMIN — Medication 650 MILLIGRAM(S): at 23:30

## 2025-05-07 RX ADMIN — HEPARIN SODIUM 5000 UNIT(S): 1000 INJECTION INTRAVENOUS; SUBCUTANEOUS at 13:02

## 2025-05-07 RX ADMIN — OXYCODONE HYDROCHLORIDE 5 MILLIGRAM(S): 30 TABLET ORAL at 13:45

## 2025-05-07 RX ADMIN — Medication 325 MILLIGRAM(S): at 21:21

## 2025-05-07 RX ADMIN — Medication 325 MILLIGRAM(S): at 05:42

## 2025-05-07 RX ADMIN — ROSUVASTATIN CALCIUM 5 MILLIGRAM(S): 20 TABLET, FILM COATED ORAL at 21:21

## 2025-05-07 RX ADMIN — OXYCODONE HYDROCHLORIDE 5 MILLIGRAM(S): 30 TABLET ORAL at 14:21

## 2025-05-07 NOTE — PROGRESS NOTE ADULT - SUBJECTIVE AND OBJECTIVE BOX
SOCORRO JOHN 75y Female  MRN#: [id number]     Hospital Day: 2d    Pt is currently admitted with the primary diagnosis of  Fever due to unspecified condition        SUBJECTIVE     Overnight events  None    Subjective complaints  Pt was evaluated this am. Patient denied any active complaints and per patient his symptoms are improving                                            ----------------------------------------------------------  OBJECTIVE  PAST MEDICAL & SURGICAL HISTORY  HTN (hypertension)    HLD (hyperlipidemia)    Cataract    S/P liudmila    H/O lumpectomy  left    History of dilation and curettage    Status post cataract extraction and insertion of intraocular lens, left                                              -----------------------------------------------------------  ALLERGIES:  latex (Rash)  penicillin (Unknown)                                            ------------------------------------------------------------    HOME MEDICATIONS  Home Medications:                           MEDICATIONS:  STANDING MEDICATIONS  amLODIPine   Tablet 5 milliGRAM(s) Oral daily  heparin   Injectable 5000 Unit(s) SubCutaneous every 8 hours  polyethylene glycol 3350 17 Gram(s) Oral daily  rosuvastatin 5 milliGRAM(s) Oral at bedtime  sodium bicarbonate 325 milliGRAM(s) Oral every 8 hours    PRN MEDICATIONS  acetaminophen     Tablet .. 650 milliGRAM(s) Oral every 6 hours PRN  oxyCODONE    IR 5 milliGRAM(s) Oral four times a day PRN  senna 2 Tablet(s) Oral at bedtime PRN                                            ------------------------------------------------------------  VITAL SIGNS: Last 24 Hours  T(C): 36.7 (07 May 2025 04:47), Max: 37.5 (06 May 2025 20:17)  T(F): 98 (07 May 2025 04:47), Max: 99.5 (06 May 2025 20:17)  HR: 80 (07 May 2025 04:47) (76 - 80)  BP: 154/70 (07 May 2025 04:47) (124/71 - 154/70)  BP(mean): --  RR: 18 (07 May 2025 04:47) (18 - 18)  SpO2: 94% (07 May 2025 04:47) (94% - 95%)                                             --------------------------------------------------------------  LABS:                        7.0    8.58  )-----------( 362      ( 07 May 2025 06:57 )             22.7     05-07    139  |  108  |  39[H]  ----------------------------<  96  5.0   |  20  |  1.6[H]    Ca    8.4      07 May 2025 06:57    TPro  6.2  /  Alb  2.8[L]  /  TBili  0.2  /  DBili  x   /  AST  55[H]  /  ALT  25  /  AlkPhos  114  05-05      Urinalysis Basic - ( 07 May 2025 06:57 )    Color: x / Appearance: x / SG: x / pH: x  Gluc: 96 mg/dL / Ketone: x  / Bili: x / Urobili: x   Blood: x / Protein: x / Nitrite: x   Leuk Esterase: x / RBC: x / WBC x   Sq Epi: x / Non Sq Epi: x / Bacteria: x              Culture - Blood (collected 05 May 2025 17:15)  Source: Blood Blood-Peripheral  Preliminary Report (07 May 2025 02:01):    No growth at 24 hours    Culture - Blood (collected 05 May 2025 17:15)  Source: Blood Blood-Peripheral  Preliminary Report (07 May 2025 02:01):    No growth at 24 hours                                                    -------------------------------------------------------------  RADIOLOGY:                                            --------------------------------------------------------------    PHYSICAL EXAM:  GENERAL: NAD, lying in bed comfortably, AOx1-2  HEAD:  Atraumatic, Normocephalic  EYES: EOMI, conjunctiva and sclera clear  ENT: Moist mucous membranes  NECK: Supple, No JVD  CHEST/LUNG: Clear to auscultation bilaterally; No rales, rhonchi, wheezing, or rubs. Unlabored respirations  HEART: regular rate and rhythm; No murmurs, rubs, or gallops  ABDOMEN: Bowel sounds present; Soft, Nontender, Nondistended.    EXTREMITIES: Warm. No clubbing, cyanosis, or edema  NERVOUS SYSTEM:  Alert & Oriented X3. No focal deficits   SKIN: No rashes or lesions                                           --------------------------------------------------------------

## 2025-05-07 NOTE — CHART NOTE - NSCHARTNOTEFT_GEN_A_CORE
Pt has Metastatic carcinoma of Mullerian origin.   Immunodeficiency in the setting setting of advanced age, CKD, suspected metastatic malignancy among other co-morbidities.    Pt will require a semi electric hospital bed due to her condition.   Pt requires the head of the bed to be elevated more than 30 degrees. Pillows and wedges are not effective. Patient requires positioning of the body in ways not feasible with an ordinary bed. Patient requires frequent repositioning to alleviate pain. The member can independently affect the adjustment by operating the control.

## 2025-05-07 NOTE — PROGRESS NOTE ADULT - ASSESSMENT
CKD stage $ (Cr based GFR overestimating true renal function)  hyperkalemia, mild  metabolic acidosis, mild   left hydronephrosis due to pelvic mass  left adnexal mass / heterogeneous uterus / splenic and hepatic masses / focal colon thickening / bone mets  metastatic malignancy of Mullerian origin (ovarian?)  dementia   wt loss / poor PO intake  anemia    HTN    plan:    gyn-onc recommending ureteral stent if chemotherapy to be given, thus  eval if  pursues chemotherapy  low K+ diet  lokelma PRN  cont oral sodium bicarbonate   encourage PO hydration  cont amlodipine  f/u urine culture  obtain repeat renal sono if flank pain, s/s of UTI or worsening renal function   f/u  / gyn-onc    trend H/H   PRN PRBC transfusion  DNR / DNI  d/w  at bedside

## 2025-05-07 NOTE — CONSULT NOTE ADULT - ASSESSMENT
75 year old woman recently discharged a few days ago for metastatic cancer of unknown primary origin presented with her  as he is having trouble at the moment caring for her. 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  - Metastatic high grade carcinoma of Mullerian origin (see comment).    CT abdomen done in April 24 reviewed :shows dominant left adnexal mass measuring upto 10 cm. lobulated heterogenous uterus/uterine remnant  Focal circumferential wall thickening of sigmoid colon, hepatic and splenic masses, possible L4-L5 discitits  MRI lumbar spine didnt reveal any active infection, shows diffuse bone lesions  : 9000, CEA 36    # Normocytic anemia:  Iron panel: total iron 15, Saturation 9, ferritin 575  Panel is s/o anemia of chronic disease   75 year old woman recently discharged a few days ago for metastatic cancer of unknown primary origin presented with her  as he is having trouble at the moment caring for her. 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  - Metastatic high grade carcinoma of Mullerian origin (see comment).    CT abdomen done in April 24 reviewed :shows dominant left adnexal mass measuring upto 10 cm. lobulated heterogenous uterus/uterine remnant  Focal circumferential wall thickening of sigmoid colon, hepatic and splenic masses, possible L4-L5 discitits  MRI lumbar spine didnt reveal any active infection, shows diffuse bone lesions  : 9000, CEA 36  She lives with her     # Normocytic anemia:  Iron panel: total iron 15, Saturation 9, ferritin 575  Panel is s/o anemia of chronic disease    #Cognitive Decline   had noted mild decline in Jan, significant decline since March  MRI brain done in March showed no acute pathology   75 year old woman recently discharged a few days ago for metastatic cancer of unknown primary origin presented with her  as he is having trouble at the moment caring for her. 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  - Metastatic high grade carcinoma of Mullerian origin (see comment).    CT abdomen done in April 24 reviewed :shows dominant left adnexal mass measuring upto 10 cm. 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  Will plan for chemotherapy once she is discharged, will arrange for outpatient follow up   75 year old woman recently discharged a few days ago for metastatic cancer of unknown primary origin presented with her  as he is having trouble at the moment caring for her. 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  - Metastatic high grade carcinoma of Mullerian origin (see comment).    CT abdomen done in April 24 reviewed :shows dominant left adnexal mass measuring upto 10 cm. 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  Will plan for chemotherapy once she is discharged, will arrange for outpatient follow up

## 2025-05-07 NOTE — CONSULT NOTE ADULT - ASSESSMENT
Stage IV carcinoma of Mullerian origin, either tubal/ovary or endometrial.  Not a surgery candidate at present as she is unlikely to have a complete resection, will need 3-4 cycles of chemotherapy, followed by possible IDS if there is a good response.  Would also recommend a L sided stent be placed, as she is going to receive chemotherapy.    Spoke to patient and her  who are in agreement about the plan, and communicated with medical oncology.    45 min in [facility], review and MIKE

## 2025-05-07 NOTE — PROGRESS NOTE ADULT - ASSESSMENT
75 year old woman recently discharged a few days ago for metastatic cancer of unknown primary origin presented with her  as he is having trouble at the moment caring for her. Patient's PCP referred her to the ED because she complained of back pain but did not complain of pain to her     # Immunodeficiency in the setting setting of advanced age, CKD, suspected metastatic malignancy among other co-morbidities.    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  # left adnexal mass / heterogeneous uterus / splenic and hepatic masses / focal colon thickening   # Peritoneal mass biopsy shows  - POSITIVE FOR MALIGNANT CELLS- Metastatic high grade carcinoma of Mullerian origin.  - : 9000, CEA 36  -  CT A/p w/out contrast in April: lobulated liver contour is consistent with cirrhotic changes. Perihepatic 6.2 x 5.0 cm irregular hypodensity/collection. Moderate left hydronephrosis extending into pelvis, likely obstructed by pelvic mass. Anterior 3.6 cm omental mass, possibly connected to adjacent lobulated pelvic mass.  - MR spine in April: Multiple varying sized lesions demonstrated throughout the osseous structures consistent with metastatic disease. The lesions range from punctiform, iliac and sacral bones and larger lesions which is 2.5 and 3 cm (left iliac bone, T12).None of the lesions are associated with soft tissue masses or spinal stenosis. There are multilevel degenerative changes. Mild bulging L5-S1 with foraminal encroachment. Loss of disc space height at L4-L5 mild destructive changes may be previous infection but there is no evidence for edema or enhancement suggest any active infection. No spinal stenosis. Conus is unremarkable. The osseous lesions enhance. Enhancement pattern is otherwise,  didnt reveal any active infection, shows diffuse bone lesions  unremarkable. Adnexal mass partially included on the study.  - as per Gyn/onc Not a surgery candidate at present as she is unlikely to have a complete resection, will need 3-4 cycles of chemotherapy, followed by possible IDS if there is a good response.  Would also recommend a L sided stent be placed, as she is going to receive chemotherapy.  - Follow up hem/onc   - pain controlled currently from tylenol for prn for mild pain and oxycodone 5 mg for moderate/severe pain  - continue with bowel regimen  - f/u biopsy from 4/29 POSITIVE FOR MALIGNANT CELLS- Metastatic high grade carcinoma of Mullerian origin.  - Goals of care discussed with .  reaffirmed DNR/DNI with trial of NIV which he revoked today 5/7/25  - palliative following    # Placement  -  reports that he can not take of care at home  - Case management eval for placement  - PT eval, recommending rehab vs home with assistance    # CKD stage 5   # Hyperkalemia, mild  # Metabolic acidosis, mild   # left hydronephrosis due to pelvic mass  # anemia pf chronic disease    # HTN    plan:  - continue with low K+ diet  - continue with lokelma PRN  - continue with  oral sodium bicarbonate   - c/w gentle maintenance IVF  - encourage PO hydration  - continue with amlodipine   -  previously declined  intervention (PCN vs ureteral stent) for left hydro until path returns and determination of definitive management of malignancy  - obtain repeat renal sono if flank pain, s/s of UTI or worsening renal function   - nephro following    # Asymtpomatic bactereuria?   - UA from 30th april was positive , ucx negative    # Normocytic anemia: anemia of chronic disease  -  total iron 15, Saturation 9, ferritin 575  - b12 and folate normal    #HTN / HLD  -on amlodipine and rosuvastatin  -c/w home meds  -monitor BP    # Misc  - DVT Prophylaxis: heparin   Injectable  - GI Prophylaxis:   - Diet: Diet, Regular  - Activity: Activity - Ambulate with Assistance  - Code Status: Full Do Not Resuscitate    Handoff: Follow up hem/onc ,stent placement possible, palcement ,

## 2025-05-07 NOTE — CONSULT NOTE ADULT - SUBJECTIVE AND OBJECTIVE BOX
Hematology/Oncology Consult Note    HPI:  75 year old woman recently discharged a few days ago for metastatic cancer of unknown primary origin presented with her  as he is having trouble at the moment caring for her. Patient's PCP referred her to the ED because she complained of back pain but did not complain of pain to her . she is AAO times 1-2 at the moment. Her ROS as per  is negative. Patient has a biopsy done with IR on 4/29 for RUQ omental mass (CT-guided).  Biopsy pending    In the ED vitals WNL    labs: K :5.3 . Other labs were same as in May 2     Patient received LOKELMA 5 mg in the ED (05 May 2025 23:29)      Oncology Hx:      Allergies    latex (Rash)  penicillin (Unknown)    Intolerances        MEDICATIONS  (STANDING):  amLODIPine   Tablet 5 milliGRAM(s) Oral daily  heparin   Injectable 5000 Unit(s) SubCutaneous every 8 hours  polyethylene glycol 3350 17 Gram(s) Oral daily  rosuvastatin 5 milliGRAM(s) Oral at bedtime  sodium bicarbonate 325 milliGRAM(s) Oral every 8 hours    MEDICATIONS  (PRN):  acetaminophen     Tablet .. 650 milliGRAM(s) Oral every 6 hours PRN Mild Pain (1 - 3)  oxyCODONE    IR 5 milliGRAM(s) Oral four times a day PRN Moderate Pain (4 - 6)  senna 2 Tablet(s) Oral at bedtime PRN Constipation      PAST MEDICAL & SURGICAL HISTORY:  HTN (hypertension)      HLD (hyperlipidemia)      Cataract      S/P liudmila      H/O lumpectomy  left      History of dilation and curettage      Status post cataract extraction and insertion of intraocular lens, left          FAMILY HISTORY:      SOCIAL HISTORY: No EtOH, no tobacco    REVIEW OF SYSTEMS:    CONSTITUTIONAL: No weakness, fevers or chills  EYES/ENT: No visual changes;  No vertigo or throat pain   NECK: No pain or stiffness  RESPIRATORY: No cough, wheezing, hemoptysis; No shortness of breath  CARDIOVASCULAR: No chest pain or palpitations  GASTROINTESTINAL: No abdominal or epigastric pain. No nausea, vomiting, or hematemesis; No diarrhea or constipation. No melena or hematochezia.  GENITOURINARY: No dysuria, frequency or hematuria  NEUROLOGICAL: No numbness or weakness  SKIN: No itching, burning, rashes, or lesions   All other review of systems is negative unless indicated above.        T(F): 98 (05-07-25 @ 04:47), Max: 99.5 (05-06-25 @ 20:17)  HR: 80 (05-07-25 @ 04:47)  BP: 154/70 (05-07-25 @ 04:47)  RR: 18 (05-07-25 @ 04:47)  SpO2: 94% (05-07-25 @ 04:47)  Wt(kg): --    GENERAL: NAD, well-developed  HEAD:  Atraumatic, Normocephalic  EYES: EOMI, PERRLA, conjunctiva and sclera clear  NECK: Supple, No JVD  CHEST/LUNG: Clear to auscultation bilaterally; No wheeze  HEART: Regular rate and rhythm; No murmurs, rubs, or gallops  ABDOMEN: Soft, Nontender, Nondistended; Bowel sounds present  EXTREMITIES:  2+ Peripheral Pulses, No clubbing, cyanosis, or edema  NEUROLOGY: non-focal  SKIN: No rashes or lesions                          7.0    8.58  )-----------( 362      ( 07 May 2025 06:57 )             22.7       05-06    142  |  113[H]  |  45[H]  ----------------------------<  94  5.2[H]   |  19  |  1.7[H]    Ca    8.4      06 May 2025 07:29    TPro  6.2  /  Alb  2.8[L]  /  TBili  0.2  /  DBili  x   /  AST  55[H]  /  ALT  25  /  AlkPhos  114  05-05      < from: CT Abdomen and Pelvis w/ Oral Cont and w/ IV Cont (04.27.25 @ 14:47) >    Dominant left adnexal mass measuring up to 10 cm    Lobulated heterogeneous uterus/uterine remnant may reflect another   primary neoplasm.    Focal circumferential wall thickening of thesigmoid colon measuring   approximately 4.4 cm in length. It is not clear whether this reflects a   metastatic lesion or another primary.    Hepatic and splenic masses consistent with metastatic disease.    Possible L4-L5 discitis/osteomyelitis. Further evaluation with MRI may be   of benefit.    < end of copied text >       Hematology/Oncology Consult Note    HPI:  75 year old woman recently discharged a few days ago for metastatic cancer of unknown primary origin presented with her  as he is having trouble at the moment caring for her. Patient's PCP referred her to the ED because she complained of back pain but did not complain of pain to her . she is AAO times 1-2 at the moment. Her ROS as per  is negative. Patient has a biopsy done with IR on 4/29 for RUQ omental mass (CT-guided).  Biopsy pending    In the ED vitals WNL    labs: K :5.3 . Other labs were same as in May 2     Patient received LOKELMA 5 mg in the ED (05 May 2025 23:29)      Oncology Hx:      Allergies    latex (Rash)  penicillin (Unknown)    Intolerances        MEDICATIONS  (STANDING):  amLODIPine   Tablet 5 milliGRAM(s) Oral daily  heparin   Injectable 5000 Unit(s) SubCutaneous every 8 hours  polyethylene glycol 3350 17 Gram(s) Oral daily  rosuvastatin 5 milliGRAM(s) Oral at bedtime  sodium bicarbonate 325 milliGRAM(s) Oral every 8 hours    MEDICATIONS  (PRN):  acetaminophen     Tablet .. 650 milliGRAM(s) Oral every 6 hours PRN Mild Pain (1 - 3)  oxyCODONE    IR 5 milliGRAM(s) Oral four times a day PRN Moderate Pain (4 - 6)  senna 2 Tablet(s) Oral at bedtime PRN Constipation      PAST MEDICAL & SURGICAL HISTORY:  HTN (hypertension)  HLD (hyperlipidemia)  Cataract  S/P liudmila  H/O lumpectomy  left  History of dilation and curettage      Status post cataract extraction and insertion of intraocular lens, left          FAMILY HISTORY:      SOCIAL HISTORY: No EtOH, no tobacco    REVIEW OF SYSTEMS:  She denies any pain  She does not remember why she is in the hospital      T(F): 98 (05-07-25 @ 04:47), Max: 99.5 (05-06-25 @ 20:17)  HR: 80 (05-07-25 @ 04:47)  BP: 154/70 (05-07-25 @ 04:47)  RR: 18 (05-07-25 @ 04:47)  SpO2: 94% (05-07-25 @ 04:47)  Wt(kg): --    GENERAL: NAD  CHEST/LUNG: Clear to auscultation bilaterally  HEART: Regular rate and rhythm;  ABDOMEN: Soft, Nontender  EXTREMITIES:  2+ Peripheral Pulses, No clubbing, cyanosis, or edema  NEUROLOGY: Non focal, AO x 1,   SKIN: No rashes or lesions                          7.0    8.58  )-----------( 362      ( 07 May 2025 06:57 )             22.7       05-06    142  |  113[H]  |  45[H]  ----------------------------<  94  5.2[H]   |  19  |  1.7[H]    Ca    8.4      06 May 2025 07:29    TPro  6.2  /  Alb  2.8[L]  /  TBili  0.2  /  DBili  x   /  AST  55[H]  /  ALT  25  /  AlkPhos  114  05-05      < from: CT Abdomen and Pelvis w/ Oral Cont and w/ IV Cont (04.27.25 @ 14:47) >    Dominant left adnexal mass measuring up to 10 cm    Lobulated heterogeneous uterus/uterine remnant may reflect another   primary neoplasm.    Focal circumferential wall thickening of thesigmoid colon measuring   approximately 4.4 cm in length. It is not clear whether this reflects a   metastatic lesion or another primary.    Hepatic and splenic masses consistent with metastatic disease.    Possible L4-L5 discitis/osteomyelitis. Further evaluation with MRI may be   of benefit.    < end of copied text >

## 2025-05-07 NOTE — CONSULT NOTE ADULT - SUBJECTIVE AND OBJECTIVE BOX
74 y/o known to our service, admitted recently with mental status changes, and found to have carcinomatosis and hydronephrosis.    IR Bx from last week was resulted yesterday and indicates a tumor of Mullerian origin    She is readmitted with non specific Sx of back pain which is currently improved.    CT images were again reviewed, there are splenic and hepatic mets, as well as omental and pelvic masses. There is also moderate L sided hydronephrosis.        Final Diagnosis  PERITONEAL MASS, RIGHT UPPER QUADRANT, CT-GUIDED BIOPSY WITH IMPRINTS:  - POSITIVE FOR MALIGNANT CELLS.  - Metastatic high grade carcinoma of Mullerian origin (see comment).      Comment: Immunohistochemical stains are performed with appropriate  controls and show malignant cells are positive for CK7, PAX-8, negative  for calretinin, CK20, CD45, GATA3, TTF-1, support the diagnosis. The  primary site include ovarian, uterine, or primary peritoneal carcinoma.  Clinical correlation is required.  Screened by: Aki Brooks MS, CT(ASCP)  Verified by: Glenny Valencia M.D.  (Electronic Signature)

## 2025-05-08 LAB
ALBUMIN SERPL ELPH-MCNC: 2.6 G/DL — LOW (ref 3.5–5.2)
ALP SERPL-CCNC: 118 U/L — HIGH (ref 30–115)
ALT FLD-CCNC: 37 U/L — SIGNIFICANT CHANGE UP (ref 0–41)
ANION GAP SERPL CALC-SCNC: 11 MMOL/L — SIGNIFICANT CHANGE UP (ref 7–14)
AST SERPL-CCNC: 87 U/L — HIGH (ref 0–41)
BILIRUB SERPL-MCNC: 0.4 MG/DL — SIGNIFICANT CHANGE UP (ref 0.2–1.2)
BUN SERPL-MCNC: 37 MG/DL — HIGH (ref 10–20)
CALCIUM SERPL-MCNC: 8.5 MG/DL — SIGNIFICANT CHANGE UP (ref 8.4–10.5)
CHLORIDE SERPL-SCNC: 109 MMOL/L — SIGNIFICANT CHANGE UP (ref 98–110)
CO2 SERPL-SCNC: 20 MMOL/L — SIGNIFICANT CHANGE UP (ref 17–32)
CREAT SERPL-MCNC: 1.5 MG/DL — SIGNIFICANT CHANGE UP (ref 0.7–1.5)
CULTURE RESULTS: SIGNIFICANT CHANGE UP
EGFR: 36 ML/MIN/1.73M2 — LOW
EGFR: 36 ML/MIN/1.73M2 — LOW
GLUCOSE SERPL-MCNC: 157 MG/DL — HIGH (ref 70–99)
HCT VFR BLD CALC: 23.3 % — LOW (ref 37–47)
HCT VFR BLD CALC: 24.4 % — LOW (ref 37–47)
HGB BLD-MCNC: 7.3 G/DL — LOW (ref 12–16)
HGB BLD-MCNC: 7.6 G/DL — LOW (ref 12–16)
MAGNESIUM SERPL-MCNC: 2 MG/DL — SIGNIFICANT CHANGE UP (ref 1.8–2.4)
MCHC RBC-ENTMCNC: 28.6 PG — SIGNIFICANT CHANGE UP (ref 27–31)
MCHC RBC-ENTMCNC: 28.9 PG — SIGNIFICANT CHANGE UP (ref 27–31)
MCHC RBC-ENTMCNC: 31.1 G/DL — LOW (ref 32–37)
MCHC RBC-ENTMCNC: 31.3 G/DL — LOW (ref 32–37)
MCV RBC AUTO: 91.7 FL — SIGNIFICANT CHANGE UP (ref 81–99)
MCV RBC AUTO: 92.1 FL — SIGNIFICANT CHANGE UP (ref 81–99)
NRBC BLD AUTO-RTO: 0 /100 WBCS — SIGNIFICANT CHANGE UP (ref 0–0)
NRBC BLD AUTO-RTO: 0 /100 WBCS — SIGNIFICANT CHANGE UP (ref 0–0)
PLATELET # BLD AUTO: 381 K/UL — SIGNIFICANT CHANGE UP (ref 130–400)
PLATELET # BLD AUTO: 440 K/UL — HIGH (ref 130–400)
PMV BLD: 11.4 FL — HIGH (ref 7.4–10.4)
PMV BLD: 11.5 FL — HIGH (ref 7.4–10.4)
POTASSIUM SERPL-MCNC: 4.7 MMOL/L — SIGNIFICANT CHANGE UP (ref 3.5–5)
POTASSIUM SERPL-SCNC: 4.7 MMOL/L — SIGNIFICANT CHANGE UP (ref 3.5–5)
PROT SERPL-MCNC: 6.2 G/DL — SIGNIFICANT CHANGE UP (ref 6–8)
RBC # BLD: 2.53 M/UL — LOW (ref 4.2–5.4)
RBC # BLD: 2.66 M/UL — LOW (ref 4.2–5.4)
RBC # FLD: 14.1 % — SIGNIFICANT CHANGE UP (ref 11.5–14.5)
RBC # FLD: 14.2 % — SIGNIFICANT CHANGE UP (ref 11.5–14.5)
SODIUM SERPL-SCNC: 140 MMOL/L — SIGNIFICANT CHANGE UP (ref 135–146)
SPECIMEN SOURCE: SIGNIFICANT CHANGE UP
URATE SERPL-MCNC: 6.7 MG/DL — SIGNIFICANT CHANGE UP (ref 2.5–7)
URATE SERPL-MCNC: 7.2 MG/DL — HIGH (ref 2.5–7)
WBC # BLD: 8.09 K/UL — SIGNIFICANT CHANGE UP (ref 4.8–10.8)
WBC # BLD: 9.02 K/UL — SIGNIFICANT CHANGE UP (ref 4.8–10.8)
WBC # FLD AUTO: 8.09 K/UL — SIGNIFICANT CHANGE UP (ref 4.8–10.8)
WBC # FLD AUTO: 9.02 K/UL — SIGNIFICANT CHANGE UP (ref 4.8–10.8)

## 2025-05-08 PROCEDURE — 74018 RADEX ABDOMEN 1 VIEW: CPT | Mod: 26

## 2025-05-08 PROCEDURE — 99232 SBSQ HOSP IP/OBS MODERATE 35: CPT

## 2025-05-08 PROCEDURE — 76770 US EXAM ABDO BACK WALL COMP: CPT | Mod: 26

## 2025-05-08 PROCEDURE — 99497 ADVNCD CARE PLAN 30 MIN: CPT

## 2025-05-08 RX ORDER — ACETAMINOPHEN 500 MG/5ML
650 LIQUID (ML) ORAL EVERY 6 HOURS
Refills: 0 | Status: DISCONTINUED | OUTPATIENT
Start: 2025-05-08 | End: 2025-05-13

## 2025-05-08 RX ORDER — SENNA 187 MG
2 TABLET ORAL AT BEDTIME
Refills: 0 | Status: DISCONTINUED | OUTPATIENT
Start: 2025-05-08 | End: 2025-05-13

## 2025-05-08 RX ORDER — LACTULOSE 10 G/15ML
200 SOLUTION ORAL ONCE
Refills: 0 | Status: COMPLETED | OUTPATIENT
Start: 2025-05-08 | End: 2025-05-08

## 2025-05-08 RX ORDER — POLYETHYLENE GLYCOL 3350 17 G/17G
17 POWDER, FOR SOLUTION ORAL
Refills: 0 | Status: DISCONTINUED | OUTPATIENT
Start: 2025-05-08 | End: 2025-05-13

## 2025-05-08 RX ORDER — ACETAMINOPHEN 500 MG/5ML
1000 LIQUID (ML) ORAL ONCE
Refills: 0 | Status: COMPLETED | OUTPATIENT
Start: 2025-05-08 | End: 2025-05-08

## 2025-05-08 RX ORDER — ACETAMINOPHEN 500 MG/5ML
650 LIQUID (ML) ORAL EVERY 6 HOURS
Refills: 0 | Status: DISCONTINUED | OUTPATIENT
Start: 2025-05-08 | End: 2025-05-08

## 2025-05-08 RX ORDER — OXYCODONE HYDROCHLORIDE 30 MG/1
5 TABLET ORAL EVERY 6 HOURS
Refills: 0 | Status: DISCONTINUED | OUTPATIENT
Start: 2025-05-08 | End: 2025-05-13

## 2025-05-08 RX ADMIN — Medication 400 MILLIGRAM(S): at 21:19

## 2025-05-08 RX ADMIN — POLYETHYLENE GLYCOL 3350 17 GRAM(S): 17 POWDER, FOR SOLUTION ORAL at 17:49

## 2025-05-08 RX ADMIN — Medication 325 MILLIGRAM(S): at 13:28

## 2025-05-08 RX ADMIN — Medication 325 MILLIGRAM(S): at 21:17

## 2025-05-08 RX ADMIN — OXYCODONE HYDROCHLORIDE 5 MILLIGRAM(S): 30 TABLET ORAL at 18:18

## 2025-05-08 RX ADMIN — POLYETHYLENE GLYCOL 3350 17 GRAM(S): 17 POWDER, FOR SOLUTION ORAL at 13:28

## 2025-05-08 RX ADMIN — ROSUVASTATIN CALCIUM 5 MILLIGRAM(S): 20 TABLET, FILM COATED ORAL at 21:18

## 2025-05-08 RX ADMIN — OXYCODONE HYDROCHLORIDE 5 MILLIGRAM(S): 30 TABLET ORAL at 10:37

## 2025-05-08 RX ADMIN — LACTULOSE 200 GRAM(S): 10 SOLUTION ORAL at 13:25

## 2025-05-08 RX ADMIN — AMLODIPINE BESYLATE 5 MILLIGRAM(S): 10 TABLET ORAL at 05:56

## 2025-05-08 RX ADMIN — Medication 1000 MILLIGRAM(S): at 22:00

## 2025-05-08 RX ADMIN — Medication 325 MILLIGRAM(S): at 05:56

## 2025-05-08 NOTE — DIETITIAN INITIAL EVALUATION ADULT - OTHER CALCULATIONS
Energy needs calculated with consideration for acuity of illness, weight status, NFPF, skin integrity, & age   Increase kcal & pro due to cancer

## 2025-05-08 NOTE — DIETITIAN INITIAL EVALUATION ADULT - ORAL INTAKE PTA/DIET HISTORY
RD contacted pt's  who reports pt had a very low appetite since mid March, consuming <50% of 3 meals/day, denies therapeutic diet, NKFA. Usual body wt 67.7 kg, 5 months ago, current wt 56.7 kg, a 16% wt loss in 5 months. Pt meet wt criteria for malnutrition.

## 2025-05-08 NOTE — DIETITIAN INITIAL EVALUATION ADULT - NS FNS DIET ORDER
Diet, DASH/TLC:   Sodium & Cholesterol Restricted  For patients receiving Renal Replacement - No Protein Restr, No Conc K, No Conc Phos, Low  Sodium (RENAL) (05-08-25 @ 12:04)

## 2025-05-08 NOTE — PROGRESS NOTE ADULT - ASSESSMENT
CKD stage 4  metabolic acidosis, mild   left hydronephrosis due to pelvic mass  left adnexal mass / heterogeneous uterus / splenic and hepatic masses / focal colon thickening / bone mets  metastatic malignancy of Mullerian origin (ovarian?)  dementia   wt loss / poor PO intake  anemia    HTN    plan:    f/u renal sono   low K+ diet  lokelma PRN  cont oral sodium bicarbonate   encourage PO hydration  cont amlodipine   trend H/H   PRN PRBC transfusion  d/w  and resident at bedside

## 2025-05-08 NOTE — DIETITIAN NUTRITION RISK NOTIFICATION - TREATMENT: THE FOLLOWING DIET HAS BEEN RECOMMENDED
Diet, DASH/TLC:   Sodium & Cholesterol Restricted  For patients receiving Renal Replacement - No Protein Restr, No Conc K, No Conc Phos, Low  Sodium (RENAL)  Prosource Gelatein Plus     Qty per Day:  2  Supplement Feeding Modality:  Oral  Nepro Cans or Servings Per Day:  1       Frequency:  Two Times a day  Ensure Pudding Cans or Servings Per Day:  1       Frequency:  Daily (05-08-25 @ 21:36) [Pending Verification By Attending]  Diet, DASH/TLC:   Sodium & Cholesterol Restricted  For patients receiving Renal Replacement - No Protein Restr, No Conc K, No Conc Phos, Low  Sodium (RENAL) (05-08-25 @ 12:04) [Active]

## 2025-05-08 NOTE — DIETITIAN INITIAL EVALUATION ADULT - PERTINENT MEDS FT
MEDICATIONS  (STANDING):  acetaminophen   IVPB .. 1000 milliGRAM(s) IV Intermittent once  amLODIPine   Tablet 5 milliGRAM(s) Oral daily  polyethylene glycol 3350 17 Gram(s) Oral two times a day  rosuvastatin 5 milliGRAM(s) Oral at bedtime  senna 2 Tablet(s) Oral at bedtime  sodium bicarbonate 325 milliGRAM(s) Oral every 8 hours    MEDICATIONS  (PRN):  acetaminophen     Tablet .. 650 milliGRAM(s) Oral every 6 hours PRN Temp greater or equal to 38C (100.4F), Moderate Pain (4 - 6)  oxyCODONE    IR 5 milliGRAM(s) Oral every 6 hours PRN Severe Pain (7 - 10)

## 2025-05-08 NOTE — PROGRESS NOTE ADULT - SUBJECTIVE AND OBJECTIVE BOX
SOCORRO JOHN 75y Female  MRN#: [id number]     Hospital Day: 3d    Pt is currently admitted with the primary diagnosis of  Fever due to unspecified condition        SUBJECTIVE     Overnight events  None    Subjective complaints  Pt was evaluated this am. Patient denied any active complaints and per patient his symptoms are improving                                            ----------------------------------------------------------  OBJECTIVE  PAST MEDICAL & SURGICAL HISTORY  HTN (hypertension)    HLD (hyperlipidemia)    Cataract    S/P liudmila    H/O lumpectomy  left    History of dilation and curettage    Status post cataract extraction and insertion of intraocular lens, left                                              -----------------------------------------------------------  ALLERGIES:  latex (Rash)  penicillin (Unknown)                                            ------------------------------------------------------------    HOME MEDICATIONS  Home Medications:                           MEDICATIONS:  STANDING MEDICATIONS  amLODIPine   Tablet 5 milliGRAM(s) Oral daily  heparin   Injectable 5000 Unit(s) SubCutaneous every 8 hours  polyethylene glycol 3350 17 Gram(s) Oral daily  rosuvastatin 5 milliGRAM(s) Oral at bedtime  sodium bicarbonate 325 milliGRAM(s) Oral every 8 hours    PRN MEDICATIONS  acetaminophen     Tablet .. 650 milliGRAM(s) Oral every 6 hours PRN  oxyCODONE    IR 5 milliGRAM(s) Oral four times a day PRN  senna 2 Tablet(s) Oral at bedtime PRN                                            ------------------------------------------------------------  VITAL SIGNS: Last 24 Hours  T(C): 36.7 (07 May 2025 04:47), Max: 37.5 (06 May 2025 20:17)  T(F): 98 (07 May 2025 04:47), Max: 99.5 (06 May 2025 20:17)  HR: 80 (07 May 2025 04:47) (76 - 80)  BP: 154/70 (07 May 2025 04:47) (124/71 - 154/70)  BP(mean): --  RR: 18 (07 May 2025 04:47) (18 - 18)  SpO2: 94% (07 May 2025 04:47) (94% - 95%)                                             --------------------------------------------------------------  LABS:                        7.0    8.58  )-----------( 362      ( 07 May 2025 06:57 )             22.7     05-07    139  |  108  |  39[H]  ----------------------------<  96  5.0   |  20  |  1.6[H]    Ca    8.4      07 May 2025 06:57    TPro  6.2  /  Alb  2.8[L]  /  TBili  0.2  /  DBili  x   /  AST  55[H]  /  ALT  25  /  AlkPhos  114  05-05      Urinalysis Basic - ( 07 May 2025 06:57 )    Color: x / Appearance: x / SG: x / pH: x  Gluc: 96 mg/dL / Ketone: x  / Bili: x / Urobili: x   Blood: x / Protein: x / Nitrite: x   Leuk Esterase: x / RBC: x / WBC x   Sq Epi: x / Non Sq Epi: x / Bacteria: x              Culture - Blood (collected 05 May 2025 17:15)  Source: Blood Blood-Peripheral  Preliminary Report (07 May 2025 02:01):    No growth at 24 hours    Culture - Blood (collected 05 May 2025 17:15)  Source: Blood Blood-Peripheral  Preliminary Report (07 May 2025 02:01):    No growth at 24 hours                                                    -------------------------------------------------------------  RADIOLOGY:                                            --------------------------------------------------------------    PHYSICAL EXAM:  GENERAL: NAD, lying in bed comfortably, AOx1-2  HEAD:  Atraumatic, Normocephalic  EYES: EOMI, conjunctiva and sclera clear  ENT: Moist mucous membranes  NECK: Supple, No JVD  CHEST/LUNG: Clear to auscultation bilaterally; No rales, rhonchi, wheezing, or rubs. Unlabored respirations  HEART: regular rate and rhythm; No murmurs, rubs, or gallops  ABDOMEN: Bowel sounds present; Soft tenderness present, didnot let me complete do the exam  EXTREMITIES: Warm. No clubbing, cyanosis, or edema  NERVOUS SYSTEM:  Alert & Oriented X3. No focal deficits   SKIN: No rashes or lesions                                           --------------------------------------------------------------

## 2025-05-08 NOTE — PROGRESS NOTE ADULT - ASSESSMENT
75 year old woman recently discharged a few days ago for metastatic cancer of unknown primary origin presented with her  as he is having trouble at the moment caring for her. Patient's PCP referred her to the ED because she complained of back pain but did not complain of pain to her     # Immunodeficiency in the setting setting of advanced age, CKD, suspected metastatic malignancy among other co-morbidities.    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  # left adnexal mass / heterogeneous uterus / splenic and hepatic masses / focal colon thickening   # Peritoneal mass biopsy shows  - POSITIVE FOR MALIGNANT CELLS- Metastatic high grade carcinoma of Mullerian origin.  - : 9000, CEA 36  -  CT A/p w/out contrast in April: lobulated liver contour is consistent with cirrhotic changes. Perihepatic 6.2 x 5.0 cm irregular hypodensity/collection. Moderate left hydronephrosis extending into pelvis, likely obstructed by pelvic mass. Anterior 3.6 cm omental mass, possibly connected to adjacent lobulated pelvic mass.  - MR spine in April: Multiple varying sized lesions demonstrated throughout the osseous structures consistent with metastatic disease. The lesions range from punctiform, iliac and sacral bones and larger lesions which is 2.5 and 3 cm (left iliac bone, T12).None of the lesions are associated with soft tissue masses or spinal stenosis. There are multilevel degenerative changes. Mild bulging L5-S1 with foraminal encroachment. Loss of disc space height at L4-L5 mild destructive changes may be previous infection but there is no evidence for edema or enhancement suggest any active infection. No spinal stenosis. Conus is unremarkable. The osseous lesions enhance. Enhancement pattern is otherwise,  didnt reveal any active infection, shows diffuse bone lesions  unremarkable. Adnexal mass partially included on the study.  - as per Gyn/onc Not a surgery candidate at present as she is unlikely to have a complete resection, will need 3-4 cycles of chemotherapy, followed by possible IDS if there is a good response.Would also recommend a L sided stent be placed, as she is going to receive chemotherapy.  - Follow up hem/onc, saying OP chemotherapy   - pain controlled currently from tylenol for prn for mild pain and oxycodone 5 mg for moderate/severe pain  - continue with bowel regimen  - f/u biopsy from 4/29 POSITIVE FOR MALIGNANT CELLS- Metastatic high grade carcinoma of Mullerian origin.  - Goals of care discussed with .  reaffirmed DNR/DNI with trial of NIV which he revoked today 5/7/25  - palliative following    # Placement  -  reports that he can not take of care at home  - Case management eval for placement  - PT eval, recommending rehab vs home with assistance  -   wants to d/c home with homecare and a hospital bed.    # CKD stage 5   # Hyperkalemia, mild  # Metabolic acidosis, mild   # left hydronephrosis due to pelvic mass  # anemia pf chronic disease    # HTN    plan:  - continue with low K+ diet  - continue with lokelma PRN  - continue with  oral sodium bicarbonate   - c/w gentle maintenance IVF  - encourage PO hydration  - continue with amlodipine   -  previously declined  intervention (PCN vs ureteral stent) for left hydro until path returns and determination of definitive management of malignancy  - obtain repeat renal sono if flank pain, s/s of UTI or worsening renal function   - nephro following    # Asymtpomatic bactereuria?   - UA from 30th april was positive , ucx negative    # Normocytic anemia: anemia of chronic disease  -  total iron 15, Saturation 9, ferritin 575  - b12 and folate normal    #HTN / HLD  -on amlodipine and rosuvastatin  -c/w home meds  -monitor BP    # Misc  - DVT Prophylaxis: heparin   Injectable  - GI Prophylaxis:   - Diet: Diet, Regular  - Activity: Activity - Ambulate with Assistance  - Code Status: Full Do Not Resuscitate    Handoff: us kidney bladder 75 year old woman recently discharged a few days ago for metastatic cancer of unknown primary origin presented with her  as he is having trouble at the moment caring for her. Patient's PCP referred her to the ED because she complained of back pain but did not complain of pain to her     # Immunodeficiency in the setting setting of advanced age, CKD, suspected metastatic malignancy among other co-morbidities.      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  # left adnexal mass / heterogeneous uterus / splenic and hepatic masses / focal colon thickening   # Peritoneal mass biopsy shows  - POSITIVE FOR MALIGNANT CELLS- Metastatic high grade carcinoma of Mullerian origin.  - : 9000, CEA 36  -  CT A/p w/out contrast in April: lobulated liver contour is consistent with cirrhotic changes. Perihepatic 6.2 x 5.0 cm irregular hypodensity/collection. Moderate left hydronephrosis extending into pelvis, likely obstructed by pelvic mass. Anterior 3.6 cm omental mass, possibly connected to adjacent lobulated pelvic mass.  - MR spine in April: Multiple varying sized lesions demonstrated throughout the osseous structures consistent with metastatic disease. The lesions range from punctiform, iliac and sacral bones and larger lesions which is 2.5 and 3 cm (left iliac bone, T12).None of the lesions are associated with soft tissue masses or spinal stenosis. There are multilevel degenerative changes. Mild bulging L5-S1 with foraminal encroachment. Loss of disc space height at L4-L5 mild destructive changes may be previous infection but there is no evidence for edema or enhancement suggest any active infection. No spinal stenosis. Conus is unremarkable. The osseous lesions enhance. Enhancement pattern is otherwise,  didnt reveal any active infection, shows diffuse bone lesions  unremarkable. Adnexal mass partially included on the study.  - as per Gyn/onc Not a surgery candidate at present as she is unlikely to have a complete resection, will need 3-4 cycles of chemotherapy, followed by possible IDS if there is a good response.Would also recommend a L sided stent be placed, as she is going to receive chemotherapy.  - Follow up hem/onc, saying OP chemotherapy   - pain controlled currently from tylenol for prn for mild pain and oxycodone 5 mg for moderate/severe pain  - continue with bowel regimen, stool burden seen in xray KUB> pt not compliant with Bowel regimen.   - f/u biopsy from 4/29 POSITIVE FOR MALIGNANT CELLS- Metastatic high grade carcinoma of Mullerian origin.  - Goals of care discussed with .  reaffirmed DNR/DNI with trial of NIV   - palliative following    # Placement  -  reports that he can not take of care at home  - Case management eval for placement  - PT eval, recommending rehab vs home with assistance  -   wants to d/c home with homecare and a hospital bed.    # CKD stage 5   # Hyperkalemia, mild  # Metabolic acidosis, mild   # left hydronephrosis due to pelvic mass  # anemia pf chronic disease    # HTN    plan:  - repeat renal and bladder US, f.u  - continue with low K+ diet  - continue with lokelma PRN  - continue with  oral sodium bicarbonate   - encourage PO hydration  - continue with amlodipine   -  previously declined  intervention (PCN vs ureteral stent) for left hydro until path returns and determination of definitive management of malignancy  - nephro following    # Asymtpomatic bactereuria?   - UA from 30th april was positive , ucx negative      #HTN / HLD  -on amlodipine and rosuvastatin  -c/w home meds  -monitor BP    # Misc  - DVT Prophylaxis: heparin   Injectable (on hold)  - GI Prophylaxis:   - Diet: Diet, Regular  - Activity: Activity - Ambulate with Assistance  - Code Status: Full Do Not Resuscitate    Handoff: Follow up us kidney bladder, cbc 4pm 75 year old woman recently discharged a few days ago for metastatic cancer of unknown primary origin presented with her  as he is having trouble at the moment caring for her. Patient's PCP referred her to the ED because she complained of back pain but did not complain of pain to her     # Immunodeficiency in the setting setting of advanced age, CKD, suspected metastatic malignancy among other co-morbidities.      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  # left adnexal mass / heterogeneous uterus / splenic and hepatic masses / focal colon thickening   # Peritoneal mass biopsy shows  - POSITIVE FOR MALIGNANT CELLS- Metastatic high grade carcinoma of Mullerian origin.  - : 9000, CEA 36  -  CT A/p w/out contrast in April: lobulated liver contour is consistent with cirrhotic changes. Perihepatic 6.2 x 5.0 cm irregular hypodensity/collection. Moderate left hydronephrosis extending into pelvis, likely obstructed by pelvic mass. Anterior 3.6 cm omental mass, possibly connected to adjacent lobulated pelvic mass.  - MR spine in April: Multiple varying sized lesions demonstrated throughout the osseous structures consistent with metastatic disease. The lesions range from punctiform, iliac and sacral bones and larger lesions which is 2.5 and 3 cm (left iliac bone, T12).None of the lesions are associated with soft tissue masses or spinal stenosis. There are multilevel degenerative changes. Mild bulging L5-S1 with foraminal encroachment. Loss of disc space height at L4-L5 mild destructive changes may be previous infection but there is no evidence for edema or enhancement suggest any active infection. No spinal stenosis. Conus is unremarkable. The osseous lesions enhance. Enhancement pattern is otherwise,  didnt reveal any active infection, shows diffuse bone lesions  unremarkable. Adnexal mass partially included on the study.  - as per Gyn/onc Not a surgery candidate at present as she is unlikely to have a complete resection, will need 3-4 cycles of chemotherapy, followed by possible IDS if there is a good response.Would also recommend a L sided stent be placed, as she is going to receive chemotherapy.  - Follow up hem/onc, saying OP chemotherapy   - pain controlled currently from tylenol for prn for mild pain and oxycodone 5 mg for moderate/severe pain  - continue with bowel regimen, stool burden seen, no bowel obstruction in xray KUB> pt not compliant with Bowel regimen.   - f/u biopsy from 4/29 POSITIVE FOR MALIGNANT CELLS- Metastatic high grade carcinoma of Mullerian origin.  - Goals of care discussed with .  reaffirmed DNR/DNI with trial of NIV   - palliative following    # Placement  -  reports that he can not take of care at home  - Case management eval for placement  - PT eval, recommending rehab vs home with assistance  -   wants to d/c home with homecare and a hospital bed.    # CKD stage 5   # Hyperkalemia, mild  # Metabolic acidosis, mild   # left hydronephrosis due to pelvic mass  # anemia pf chronic disease    # HTN    plan:  - repeat renal and bladder US, f.u  - continue with low K+ diet  - continue with lokelma PRN  - continue with  oral sodium bicarbonate   - encourage PO hydration  - continue with amlodipine   -  previously declined  intervention (PCN vs ureteral stent) for left hydro until path returns and determination of definitive management of malignancy  - nephro following    # Asymtpomatic bactereuria?   - UA from 30th april was positive , ucx negative      #HTN / HLD  -on amlodipine and rosuvastatin  -c/w home meds  -monitor BP    # Misc  - DVT Prophylaxis: heparin   Injectable (on hold)  - GI Prophylaxis:   - Diet: Diet, Regular  - Activity: Activity - Ambulate with Assistance  - Code Status: Full Do Not Resuscitate    Handoff: Follow up us kidney bladder, cbc 4pm

## 2025-05-08 NOTE — DIETITIAN INITIAL EVALUATION ADULT - OTHER INFO
75 year old woman recently discharged a few days ago for metastatic cancer of unknown primary origin presented with her  as he is having trouble at the moment caring for her.  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  # left adnexal mass / heterogeneous uterus / splenic and hepatic masses / focal colon thickening   # Peritoneal mass biopsy shows  - POSITIVE FOR MALIGNANT CELLS- Metastatic high grade carcinoma of Mullerian origin.  # CKD stage 5   # Hyperkalemia, mild  # Metabolic acidosis, mild   # left hydronephrosis due to pelvic mass  # anemia pf chronic disease    - continue with low K+ diet

## 2025-05-08 NOTE — DIETITIAN INITIAL EVALUATION ADULT - ADD RECOMMEND
High Nutrition Risk     Interventions: Coordination of care, meals, and nutritional supplements  Monitoring/Evaluation: Weights, labs, PO intake, nutrition-focused physical findings, tolerance of nutritional supplements.  1. Liberalize diet to Renal diet instead of DASH/TLC/Renal d/t poor po intake   2. Add nutritional supplement - Magic Cup 1x/day (290 kcal, 9 g protein) per serving, Nepro 1.8 carb steady 2x/day (420 ree, 19 g protein per serving), Prosource Gelatein HP 2x/day (160 kcal, 20 g protein per serving) - d/t poor po intake iso of cancer w. mets  3. Encourage PO intake & assist PRN

## 2025-05-08 NOTE — GOALS OF CARE CONVERSATION - ADVANCED CARE PLANNING - CONVERSATION DETAILS
Patient's terminal illness due to stage 4 cancer , poor prognosis was d/w patient's  with very poor patient's functional status, less than 6 months life expectancy, we have discussed hospice and comfort care philosophy and patient's  requested a discussion with hospice representative. Will f/up for final family decision on further treatment plan.

## 2025-05-08 NOTE — PROGRESS NOTE ADULT - SUBJECTIVE AND OBJECTIVE BOX
NEPHROLOGY FOLLOW UP NOTE    pt seen and examined  no new complaints      PAST MEDICAL & SURGICAL HISTORY:  HTN (hypertension)      HLD (hyperlipidemia)      Cataract      S/P liudmila      H/O lumpectomy  left      History of dilation and curettage      Status post cataract extraction and insertion of intraocular lens, left        Allergies:  latex (Rash)  penicillin (Unknown)    Home Medications Reviewed    SOCIAL HISTORY:  Denies ETOH,Smoking,   FAMILY HISTORY:        REVIEW OF SYSTEMS:  All other review of systems is negative unless indicated above.    PHYSICAL EXAM:  Constitutional: NAD, frail   HEENT: anicteric sclera, oropharynx clear, dry MM  Neck: No JVD  Respiratory: CTAB, no wheezes, rales or rhonchi  Cardiovascular: S1, S2, RRR  Gastrointestinal: BS+, soft, NT/ND  Extremities: No cyanosis or clubbing. trace peripheral edema  Neurological: pleasantly confused   : No CVA tenderness. No jo.   Skin: No rashes     Hospital Medications:   MEDICATIONS  (STANDING):  amLODIPine   Tablet 5 milliGRAM(s) Oral daily  polyethylene glycol 3350 17 Gram(s) Oral daily  rosuvastatin 5 milliGRAM(s) Oral at bedtime  sodium bicarbonate 325 milliGRAM(s) Oral every 8 hours        VITALS:  T(F): 98.3 (05-08-25 @ 04:38), Max: 100.4 (05-07-25 @ 20:39)  HR: 70 (05-08-25 @ 04:38)  BP: 152/74 (05-08-25 @ 04:38)  RR: 18 (05-08-25 @ 04:38)  SpO2: 97% (05-08-25 @ 04:38)  Wt(kg): --        LABS:  05-08    140  |  109  |  37[H]  ----------------------------<  157[H]  4.7   |  20  |  1.5    Ca    8.5      08 May 2025 06:56  Mg     2.0     05-08    TPro  6.2  /  Alb  2.6[L]  /  TBili  0.4  /  DBili      /  AST  87[H]  /  ALT  37  /  AlkPhos  118[H]  05-08                          7.3    8.09  )-----------( 381      ( 08 May 2025 06:56 )             23.3       Urine Studies:  Urinalysis Basic - ( 08 May 2025 06:56 )    Color:  / Appearance:  / SG:  / pH:   Gluc: 157 mg/dL / Ketone:   / Bili:  / Urobili:    Blood:  / Protein:  / Nitrite:    Leuk Esterase:  / RBC:  / WBC    Sq Epi:  / Non Sq Epi:  / Bacteria:           RADIOLOGY & ADDITIONAL STUDIES:

## 2025-05-08 NOTE — DIETITIAN INITIAL EVALUATION ADULT - PERTINENT LABORATORY DATA
05-08    140  |  109  |  37[H]  ----------------------------<  157[H]  4.7   |  20  |  1.5    Ca    8.5      08 May 2025 06:56  Mg     2.0     05-08    TPro  6.2  /  Alb  2.6[L]  /  TBili  0.4  /  DBili  x   /  AST  87[H]  /  ALT  37  /  AlkPhos  118[H]  05-08  A1C with Estimated Average Glucose Result: 6.3 % (04-25-25 @ 08:27)

## 2025-05-08 NOTE — DIETITIAN INITIAL EVALUATION ADULT - EDUCATION DIETARY MODIFICATIONS
Discussed different foods and supplements we can try to increase fiber, fat and protein intake. Discussed liberalizing diet to provide more food options to promote better food intake./(1) partially meets; needs review/practice/verbalization

## 2025-05-08 NOTE — DIETITIAN INITIAL EVALUATION ADULT - SIGNS/SYMPTOMS
Patient is unable to produce adequate sputum.  Discussed with pulmonologist who feels like tuberculosis would be extremely low risk.  CT coronary angiogram with fractional flow reserve suggests a severe lesion in proximal circumflex.  Discussed with Cath Lab charge nurse.    Would recommend angiography with possible percutaneous intervention as appropriate today.  Tuberculosis is extremely low risk, bronchoscopy is not possible in the setting of potential severe coronary artery disease.    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

## 2025-05-09 LAB
ALBUMIN SERPL ELPH-MCNC: 2.9 G/DL — LOW (ref 3.5–5.2)
ALP SERPL-CCNC: 131 U/L — HIGH (ref 30–115)
ALT FLD-CCNC: 54 U/L — HIGH (ref 0–41)
ANION GAP SERPL CALC-SCNC: 11 MMOL/L — SIGNIFICANT CHANGE UP (ref 7–14)
ANION GAP SERPL CALC-SCNC: 12 MMOL/L — SIGNIFICANT CHANGE UP (ref 7–14)
APPEARANCE UR: CLEAR — SIGNIFICANT CHANGE UP
AST SERPL-CCNC: 109 U/L — HIGH (ref 0–41)
BILIRUB SERPL-MCNC: 0.3 MG/DL — SIGNIFICANT CHANGE UP (ref 0.2–1.2)
BILIRUB UR-MCNC: NEGATIVE — SIGNIFICANT CHANGE UP
BUN SERPL-MCNC: 32 MG/DL — HIGH (ref 10–20)
BUN SERPL-MCNC: 37 MG/DL — HIGH (ref 10–20)
CALCIUM SERPL-MCNC: 8.3 MG/DL — LOW (ref 8.4–10.5)
CALCIUM SERPL-MCNC: 8.7 MG/DL — SIGNIFICANT CHANGE UP (ref 8.4–10.5)
CHLORIDE SERPL-SCNC: 104 MMOL/L — SIGNIFICANT CHANGE UP (ref 98–110)
CHLORIDE SERPL-SCNC: 109 MMOL/L — SIGNIFICANT CHANGE UP (ref 98–110)
CO2 SERPL-SCNC: 22 MMOL/L — SIGNIFICANT CHANGE UP (ref 17–32)
CO2 SERPL-SCNC: 22 MMOL/L — SIGNIFICANT CHANGE UP (ref 17–32)
COLOR SPEC: YELLOW — SIGNIFICANT CHANGE UP
CREAT SERPL-MCNC: 1.6 MG/DL — HIGH (ref 0.7–1.5)
CREAT SERPL-MCNC: 1.8 MG/DL — HIGH (ref 0.7–1.5)
DIFF PNL FLD: NEGATIVE — SIGNIFICANT CHANGE UP
EGFR: 29 ML/MIN/1.73M2 — LOW
EGFR: 29 ML/MIN/1.73M2 — LOW
EGFR: 33 ML/MIN/1.73M2 — LOW
EGFR: 33 ML/MIN/1.73M2 — LOW
FLUAV AG NPH QL: SIGNIFICANT CHANGE UP
FLUBV AG NPH QL: SIGNIFICANT CHANGE UP
GLUCOSE SERPL-MCNC: 115 MG/DL — HIGH (ref 70–99)
GLUCOSE SERPL-MCNC: 98 MG/DL — SIGNIFICANT CHANGE UP (ref 70–99)
GLUCOSE UR QL: NEGATIVE MG/DL — SIGNIFICANT CHANGE UP
HCT VFR BLD CALC: 23.3 % — LOW (ref 37–47)
HCT VFR BLD CALC: 23.5 % — LOW (ref 37–47)
HGB BLD-MCNC: 7.2 G/DL — LOW (ref 12–16)
HGB BLD-MCNC: 7.2 G/DL — LOW (ref 12–16)
KETONES UR-MCNC: NEGATIVE MG/DL — SIGNIFICANT CHANGE UP
LEUKOCYTE ESTERASE UR-ACNC: NEGATIVE — SIGNIFICANT CHANGE UP
MAGNESIUM SERPL-MCNC: 2.3 MG/DL — SIGNIFICANT CHANGE UP (ref 1.8–2.4)
MCHC RBC-ENTMCNC: 28.1 PG — SIGNIFICANT CHANGE UP (ref 27–31)
MCHC RBC-ENTMCNC: 28.5 PG — SIGNIFICANT CHANGE UP (ref 27–31)
MCHC RBC-ENTMCNC: 30.6 G/DL — LOW (ref 32–37)
MCHC RBC-ENTMCNC: 30.9 G/DL — LOW (ref 32–37)
MCV RBC AUTO: 91 FL — SIGNIFICANT CHANGE UP (ref 81–99)
MCV RBC AUTO: 92.9 FL — SIGNIFICANT CHANGE UP (ref 81–99)
MRSA PCR RESULT.: NEGATIVE — SIGNIFICANT CHANGE UP
NITRITE UR-MCNC: NEGATIVE — SIGNIFICANT CHANGE UP
NRBC BLD AUTO-RTO: 0 /100 WBCS — SIGNIFICANT CHANGE UP (ref 0–0)
NRBC BLD AUTO-RTO: 0 /100 WBCS — SIGNIFICANT CHANGE UP (ref 0–0)
PH UR: 5.5 — SIGNIFICANT CHANGE UP (ref 5–8)
PLATELET # BLD AUTO: 374 K/UL — SIGNIFICANT CHANGE UP (ref 130–400)
PLATELET # BLD AUTO: 388 K/UL — SIGNIFICANT CHANGE UP (ref 130–400)
PMV BLD: 11.3 FL — HIGH (ref 7.4–10.4)
PMV BLD: 11.6 FL — HIGH (ref 7.4–10.4)
POTASSIUM SERPL-MCNC: 5.5 MMOL/L — HIGH (ref 3.5–5)
POTASSIUM SERPL-MCNC: 5.8 MMOL/L — HIGH (ref 3.5–5)
POTASSIUM SERPL-SCNC: 5.5 MMOL/L — HIGH (ref 3.5–5)
POTASSIUM SERPL-SCNC: 5.8 MMOL/L — HIGH (ref 3.5–5)
PROT SERPL-MCNC: 5.9 G/DL — LOW (ref 6–8)
PROT UR-MCNC: 100 MG/DL
RBC # BLD: 2.53 M/UL — LOW (ref 4.2–5.4)
RBC # BLD: 2.56 M/UL — LOW (ref 4.2–5.4)
RBC # FLD: 14.2 % — SIGNIFICANT CHANGE UP (ref 11.5–14.5)
RBC # FLD: 14.3 % — SIGNIFICANT CHANGE UP (ref 11.5–14.5)
RSV RNA NPH QL NAA+NON-PROBE: SIGNIFICANT CHANGE UP
SARS-COV-2 RNA SPEC QL NAA+PROBE: SIGNIFICANT CHANGE UP
SODIUM SERPL-SCNC: 137 MMOL/L — SIGNIFICANT CHANGE UP (ref 135–146)
SODIUM SERPL-SCNC: 143 MMOL/L — SIGNIFICANT CHANGE UP (ref 135–146)
SOURCE RESPIRATORY: SIGNIFICANT CHANGE UP
SP GR SPEC: 1.02 — SIGNIFICANT CHANGE UP (ref 1–1.03)
UROBILINOGEN FLD QL: 1 MG/DL — SIGNIFICANT CHANGE UP (ref 0.2–1)
WBC # BLD: 8.81 K/UL — SIGNIFICANT CHANGE UP (ref 4.8–10.8)
WBC # BLD: 8.94 K/UL — SIGNIFICANT CHANGE UP (ref 4.8–10.8)
WBC # FLD AUTO: 8.81 K/UL — SIGNIFICANT CHANGE UP (ref 4.8–10.8)
WBC # FLD AUTO: 8.94 K/UL — SIGNIFICANT CHANGE UP (ref 4.8–10.8)

## 2025-05-09 PROCEDURE — 99232 SBSQ HOSP IP/OBS MODERATE 35: CPT

## 2025-05-09 PROCEDURE — 71045 X-RAY EXAM CHEST 1 VIEW: CPT | Mod: 26

## 2025-05-09 RX ORDER — SODIUM CHLORIDE 9 G/1000ML
1000 INJECTION, SOLUTION INTRAVENOUS
Refills: 0 | Status: DISCONTINUED | OUTPATIENT
Start: 2025-05-09 | End: 2025-05-12

## 2025-05-09 RX ORDER — BISACODYL 5 MG
10 TABLET, DELAYED RELEASE (ENTERIC COATED) ORAL DAILY
Refills: 0 | Status: DISCONTINUED | OUTPATIENT
Start: 2025-05-09 | End: 2025-05-13

## 2025-05-09 RX ORDER — SODIUM ZIRCONIUM CYCLOSILICATE 5 G/5G
10 POWDER, FOR SUSPENSION ORAL ONCE
Refills: 0 | Status: COMPLETED | OUTPATIENT
Start: 2025-05-09 | End: 2025-05-09

## 2025-05-09 RX ORDER — ACETAMINOPHEN 500 MG/5ML
1000 LIQUID (ML) ORAL ONCE
Refills: 0 | Status: COMPLETED | OUTPATIENT
Start: 2025-05-09 | End: 2025-05-09

## 2025-05-09 RX ADMIN — Medication 325 MILLIGRAM(S): at 13:06

## 2025-05-09 RX ADMIN — Medication 325 MILLIGRAM(S): at 05:50

## 2025-05-09 RX ADMIN — SODIUM ZIRCONIUM CYCLOSILICATE 10 GRAM(S): 5 POWDER, FOR SUSPENSION ORAL at 11:44

## 2025-05-09 RX ADMIN — SODIUM CHLORIDE 70 MILLILITER(S): 9 INJECTION, SOLUTION INTRAVENOUS at 13:05

## 2025-05-09 RX ADMIN — AMLODIPINE BESYLATE 5 MILLIGRAM(S): 10 TABLET ORAL at 05:50

## 2025-05-09 RX ADMIN — Medication 10 MILLIGRAM(S): at 22:15

## 2025-05-09 RX ADMIN — Medication 1000 MILLIGRAM(S): at 23:00

## 2025-05-09 RX ADMIN — OXYCODONE HYDROCHLORIDE 5 MILLIGRAM(S): 30 TABLET ORAL at 18:35

## 2025-05-09 RX ADMIN — SODIUM CHLORIDE 70 MILLILITER(S): 9 INJECTION, SOLUTION INTRAVENOUS at 11:44

## 2025-05-09 RX ADMIN — POLYETHYLENE GLYCOL 3350 17 GRAM(S): 17 POWDER, FOR SOLUTION ORAL at 05:54

## 2025-05-09 RX ADMIN — Medication 400 MILLIGRAM(S): at 22:24

## 2025-05-09 RX ADMIN — POLYETHYLENE GLYCOL 3350 17 GRAM(S): 17 POWDER, FOR SOLUTION ORAL at 17:06

## 2025-05-09 RX ADMIN — OXYCODONE HYDROCHLORIDE 5 MILLIGRAM(S): 30 TABLET ORAL at 18:09

## 2025-05-09 NOTE — HOSPICE CARE NOTE - CONVESATION DETAILS
Patient known to hospice as she was referred to our services last week. Follow up call placed to Tod to further discuss hospice services. Reviewed hospice philosophy and services at length. At this time Tod is undecided about chemo versus hospice care. Tod requesting follow up call Monday. Should Tod elect hospice services his wishes are to have patient transferred to the [facility] private pay.

## 2025-05-09 NOTE — PROGRESS NOTE ADULT - SUBJECTIVE AND OBJECTIVE BOX
SOCORRO JOHN 75y Female  MRN#: [id number]     Hospital Day: 4d    Pt is currently admitted with the primary diagnosis of  Fever due to unspecified condition        SUBJECTIVE     Overnight events  None    Subjective complaints  Pt was evaluated this am. Patient denied any active complaints and per patient his symptoms are improving                                            ----------------------------------------------------------  OBJECTIVE  PAST MEDICAL & SURGICAL HISTORY  HTN (hypertension)    HLD (hyperlipidemia)    Cataract    S/P liudmila    H/O lumpectomy  left    History of dilation and curettage    Status post cataract extraction and insertion of intraocular lens, left                                              -----------------------------------------------------------  ALLERGIES:  latex (Rash)  penicillin (Unknown)                                            ------------------------------------------------------------    HOME MEDICATIONS  Home Medications:                           MEDICATIONS:  STANDING MEDICATIONS  amLODIPine   Tablet 5 milliGRAM(s) Oral daily  lactated ringers. 1000 milliLiter(s) IV Continuous <Continuous>  polyethylene glycol 3350 17 Gram(s) Oral two times a day  rosuvastatin 5 milliGRAM(s) Oral at bedtime  senna 2 Tablet(s) Oral at bedtime  sodium bicarbonate 325 milliGRAM(s) Oral every 8 hours    PRN MEDICATIONS  acetaminophen     Tablet .. 650 milliGRAM(s) Oral every 6 hours PRN  bisacodyl Suppository 10 milliGRAM(s) Rectal daily PRN  oxyCODONE    IR 5 milliGRAM(s) Oral every 6 hours PRN                                            ------------------------------------------------------------  VITAL SIGNS: Last 24 Hours  T(C): 36.5 (09 May 2025 05:01), Max: 38.7 (08 May 2025 20:31)  T(F): 97.7 (09 May 2025 05:01), Max: 101.6 (08 May 2025 20:31)  HR: 79 (09 May 2025 08:09) (75 - 83)  BP: 136/69 (09 May 2025 05:01) (132/68 - 137/69)  BP(mean): 92 (08 May 2025 20:31) (89 - 92)  RR: 18 (09 May 2025 05:01) (18 - 19)  SpO2: 96% (09 May 2025 08:09) (92% - 99%)      25 @ 07:01  -  25 @ 07:00  --------------------------------------------------------  IN: 0 mL / OUT: 700 mL / NET: -700 mL                                             --------------------------------------------------------------  LABS:                        7.2    8.94  )-----------( 388      ( 09 May 2025 07:46 )             23.5         143  |  109  |  37[H]  ----------------------------<  98  5.8[H]   |  22  |  1.8[H]    Ca    8.7      09 May 2025 07:46  Mg     2.3     -    TPro  5.9[L]  /  Alb  2.9[L]  /  TBili  0.3  /  DBili  x   /  AST  109[H]  /  ALT  54[H]  /  AlkPhos  131[H]        Urinalysis Basic - ( 09 May 2025 10:10 )    Color: Yellow / Appearance: Clear / S.021 / pH: x  Gluc: x / Ketone: Negative mg/dL  / Bili: Negative / Urobili: 1.0 mg/dL   Blood: x / Protein: 100 mg/dL / Nitrite: Negative   Leuk Esterase: Negative / RBC: 0 /HPF / WBC 2 /HPF   Sq Epi: x / Non Sq Epi: 5 /HPF / Bacteria: Negative /HPF              Culture - Urine (collected 07 May 2025 22:35)  Source: Clean Catch None  Final Report (08 May 2025 23:47):    <10,000 CFU/mL Normal Urogenital Willa    Urinalysis with Rflx Culture (collected 07 May 2025 22:35)                                                    -------------------------------------------------------------  RADIOLOGY:                                            --------------------------------------------------------------    PHYSICAL EXAM:  GENERAL: NAD, lying in bed comfortably  HEAD:  Atraumatic, Normocephalic  EYES: EOMI, conjunctiva and sclera clear  ENT: Moist mucous membranes  NECK: Supple, No JVD  CHEST/LUNG: Clear to auscultation bilaterally; No rales, rhonchi, wheezing, or rubs. Unlabored respirations  HEART: regular rate and rhythm; No murmurs, rubs, or gallops  ABDOMEN: Bowel sounds present; Soft, Nontender, Nondistended.    EXTREMITIES: Warm. No clubbing, cyanosis, or edema  NERVOUS SYSTEM:  Alert & Oriented X3. No focal deficits   SKIN: No rashes or lesions                                           --------------------------------------------------------------

## 2025-05-09 NOTE — PROGRESS NOTE ADULT - ASSESSMENT
75 year old woman recently discharged a few days ago for metastatic cancer of unknown primary origin presented with her  as he is having trouble at the moment caring for her. 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  - Metastatic high grade carcinoma of Mullerian origin (see comment).    CT abdomen done in April 24 reviewed :shows dominant left adnexal mass measuring upto 10 cm. 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  not available, will follow up  If patient/family opt for chemotherapy, will be outpatient

## 2025-05-09 NOTE — PROGRESS NOTE ADULT - SUBJECTIVE AND OBJECTIVE BOX
SOCORRO JOHN 75y Female  MRN#: [id number]   Hospital Day: 4d    SUBJECTIVE  Patient is a 75y old Female who presents with a chief complaint of fever (09 May 2025 11:57)  Currently admitted to medicine with the primary diagnosis of Confusion      INTERVAL HPI AND OVERNIGHT EVENTS:  Patient was examined and seen at bedside.     REVIEW OF SYMPTOMS:  Remains confused      OBJECTIVE  PAST MEDICAL & SURGICAL HISTORY  HTN (hypertension)  HLD (hyperlipidemia)  Cataract  S/P liudmila  H/O lumpectomy  History of dilation and curettage  Status post cataract extraction and insertion of intraocular lens, left      ALLERGIES:  latex (Rash)  penicillin (Unknown)    MEDICATIONS:  STANDING MEDICATIONS  amLODIPine   Tablet 5 milliGRAM(s) Oral daily  lactated ringers. 1000 milliLiter(s) IV Continuous <Continuous>  polyethylene glycol 3350 17 Gram(s) Oral two times a day  rosuvastatin 5 milliGRAM(s) Oral at bedtime  senna 2 Tablet(s) Oral at bedtime  sodium bicarbonate 325 milliGRAM(s) Oral every 8 hours    PRN MEDICATIONS  acetaminophen     Tablet .. 650 milliGRAM(s) Oral every 6 hours PRN  bisacodyl Suppository 10 milliGRAM(s) Rectal daily PRN  oxyCODONE    IR 5 milliGRAM(s) Oral every 6 hours PRN      VITAL SIGNS: Last 24 Hours  T(C): 36.9 (09 May 2025 13:20), Max: 38.7 (08 May 2025 20:31)  T(F): 98.5 (09 May 2025 13:20), Max: 101.6 (08 May 2025 20:31)  HR: 77 (09 May 2025 13:20) (75 - 83)  BP: 129/74 (09 May 2025 13:20) (129/74 - 137/69)  BP(mean): 93 (09 May 2025 13:20) (89 - 93)  RR: 18 (09 May 2025 13:20) (18 - 19)  SpO2: 95% (09 May 2025 13:20) (92% - 99%)    LABS:                        7.2    8.94  )-----------( 388      ( 09 May 2025 07:46 )             23.5     05-09    143  |  109  |  37[H]  ----------------------------<  98  5.8[H]   |  22  |  1.8[H]    Ca    8.7      09 May 2025 07:46  Mg     2.3         TPro  5.9[L]  /  Alb  2.9[L]  /  TBili  0.3  /  DBili  x   /  AST  109[H]  /  ALT  54[H]  /  AlkPhos  131[H]        Urinalysis Basic - ( 09 May 2025 10:10 )    Color: Yellow / Appearance: Clear / S.021 / pH: x  Gluc: x / Ketone: Negative mg/dL  / Bili: Negative / Urobili: 1.0 mg/dL   Blood: x / Protein: 100 mg/dL / Nitrite: Negative   Leuk Esterase: Negative / RBC: 0 /HPF / WBC 2 /HPF   Sq Epi: x / Non Sq Epi: 5 /HPF / Bacteria: Negative /HPF            Culture - Urine (collected 07 May 2025 22:35)  Source: Clean Catch None  Final Report (08 May 2025 23:47):    <10,000 CFU/mL Normal Urogenital Willa    Urinalysis with Rflx Culture (collected 07 May 2025 22:35)      PHYSICAL EXAM:  CONSTITUTIONAL: No acute distress,   PULMONARY: Clear to auscultation bilaterally; no wheezes, rales, or rhonchi  CARDIOVASCULAR: Regular rate and rhythm; no murmurs, rubs, or gallops  GASTROINTESTINAL: Soft, non-tender, non-distended; bowel sounds present  MUSCULOSKELETAL: 2+ peripheral pulses; no clubbing, no cyanosis, no edema  NEUROLOGY: Non focal, confused  SKIN: No rashes or lesions; warm and dry

## 2025-05-09 NOTE — PROGRESS NOTE ADULT - SUBJECTIVE AND OBJECTIVE BOX
NEPHROLOGY FOLLOW UP NOTE    fever overnight      PAST MEDICAL & SURGICAL HISTORY:  HTN (hypertension)      HLD (hyperlipidemia)      Cataract      S/P liudmila      H/O lumpectomy  left      History of dilation and curettage      Status post cataract extraction and insertion of intraocular lens, left        Allergies:  latex (Rash)  penicillin (Unknown)    Home Medications Reviewed    SOCIAL HISTORY:  Denies ETOH,Smoking,   FAMILY HISTORY:        REVIEW OF SYSTEMS:  All other review of systems is negative unless indicated above.    PHYSICAL EXAM:  Constitutional: NAD, frail   HEENT: anicteric sclera, oropharynx clear, dry MM  Neck: No JVD  Respiratory: CTAB, no wheezes, rales or rhonchi  Cardiovascular: S1, S2, RRR  Gastrointestinal: BS+, soft, NT/ND  Extremities: No cyanosis or clubbing. trace peripheral edema  Neurological: pleasantly confused   : No CVA tenderness. No jo.   Skin: No rashes     Hospital Medications:   MEDICATIONS  (STANDING):  amLODIPine   Tablet 5 milliGRAM(s) Oral daily  lactated ringers. 1000 milliLiter(s) (70 mL/Hr) IV Continuous <Continuous>  polyethylene glycol 3350 17 Gram(s) Oral two times a day  rosuvastatin 5 milliGRAM(s) Oral at bedtime  senna 2 Tablet(s) Oral at bedtime  sodium bicarbonate 325 milliGRAM(s) Oral every 8 hours        VITALS:  T(F): 97.7 (25 @ 05:01), Max: 101.6 (25 @ 20:31)  HR: 79 (25 @ 08:09)  BP: 136/69 (25 @ 05:01)  RR: 18 (25 @ 05:01)  SpO2: 96% (25 @ 08:09)  Wt(kg): --     @ 07:01  -   @ 07:00  --------------------------------------------------------  IN: 0 mL / OUT: 700 mL / NET: -700 mL          LABS:      143  |  109  |  37[H]  ----------------------------<  98  5.8[H]   |  22  |  1.8[H]    Ca    8.7      09 May 2025 07:46  Mg     2.3         TPro  5.9[L]  /  Alb  2.9[L]  /  TBili  0.3  /  DBili      /  AST  109[H]  /  ALT  54[H]  /  AlkPhos  131[H]                            7.2    8.94  )-----------( 388      ( 09 May 2025 07:46 )             23.5       Urine Studies:  Urinalysis Basic - ( 09 May 2025 10:10 )    Color: Yellow / Appearance: Clear / S.021 / pH:   Gluc:  / Ketone: Negative mg/dL  / Bili: Negative / Urobili: 1.0 mg/dL   Blood:  / Protein: 100 mg/dL / Nitrite: Negative   Leuk Esterase: Negative / RBC: 0 /HPF / WBC 2 /HPF   Sq Epi:  / Non Sq Epi: 5 /HPF / Bacteria: Negative /HPF          RADIOLOGY & ADDITIONAL STUDIES:

## 2025-05-09 NOTE — PROGRESS NOTE ADULT - ASSESSMENT
75 year old woman recently discharged a few days ago for metastatic cancer of unknown primary origin presented with her  as he is having trouble at the moment caring for her. Patient's PCP referred her to the ED because she complained of back pain but did not complain of pain to her     # Immunodeficiency in the setting setting of advanced age, CKD, suspected metastatic malignancy among other co-morbidities.    # Fever overnight 101.6 s/p IV tylenol  - US abdomen: Moderate left hydronephrosis.Distended bladder with volume of 643 cc. > straight cath done (5/8/25), was constipated yesterday >passed bowel after lactulose enema  - UA negative, mrsa negative  - chest xray wet read negative  - Follow up blood cx   - informed hem/on regarding change in clinical status  - continue with gentle hydration for rise in creatinine  - bladder scan q8      # Normocytic anemia: anemia of chronic disease  - total iron 15, Saturation 9, ferritin 575  - b12 and folate normal  - hb 7>7.3>7.6>7.2  - monitor cbc BID  - dvt ppx held until hemoglobin stablizes,  having on going discussion with hospice    # Cognitive decline (aox1-2)  - Dementia vs paraneoplastic syndrome  - as per  had noted mild decline in Jan, significant decline since March,  mental status stable from last admission  - MRI brain done in March showed no acute pathology  - c/w home meds    # 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  # left adnexal mass / heterogeneous uterus / splenic and hepatic masses / focal colon thickening   # Peritoneal mass biopsy shows  - POSITIVE FOR MALIGNANT CELLS- Metastatic high grade carcinoma of Mullerian origin.  - : 9000, CEA 36  -  CT A/p w/out contrast in April: lobulated liver contour is consistent with cirrhotic changes. Perihepatic 6.2 x 5.0 cm irregular hypodensity/collection. Moderate left hydronephrosis extending into pelvis, likely obstructed by pelvic mass. Anterior 3.6 cm omental mass, possibly connected to adjacent lobulated pelvic mass.  - MR spine in April: Multiple varying sized lesions demonstrated throughout the osseous structures consistent with metastatic disease. The lesions range from punctiform, iliac and sacral bones and larger lesions which is 2.5 and 3 cm (left iliac bone, T12).None of the lesions are associated with soft tissue masses or spinal stenosis. There are multilevel degenerative changes. Mild bulging L5-S1 with foraminal encroachment. Loss of disc space height at L4-L5 mild destructive changes may be previous infection but there is no evidence for edema or enhancement suggest any active infection. No spinal stenosis. Conus is unremarkable. The osseous lesions enhance. Enhancement pattern is otherwise,  didnt reveal any active infection, shows diffuse bone lesions  unremarkable. Adnexal mass partially included on the study.  - as per Gyn/onc Not a surgery candidate at present as she is unlikely to have a complete resection, will need 3-4 cycles of chemotherapy, followed by possible IDS if there is a good response.Would also recommend a L sided stent be placed, as she is going to receive chemotherapy.  - Follow up hem/onc, saying OP chemotherapy   - pain controlled currently from tylenol for prn for mild pain and oxycodone 5 mg for moderate/severe pain  - continue with bowel regimen, stool burden seen, no bowel obstruction in xray KUB> pt not compliant with Bowel regimen.   - f/u biopsy from 4/29 POSITIVE FOR MALIGNANT CELLS- Metastatic high grade carcinoma of Mullerian origin.  - Goals of care discussed with .  reaffirmed DNR/DNI with trial of NIV   - palliative following    # Placement  -  reports that he can not take of care at home  - Case management eval for placement  - PT eval, recommending rehab vs home with assistance  -  wants to d/c home with homecare and a hospital bed.    # CKD stage 5   # Hyperkalemia, mild  # Metabolic acidosis, mild   # left hydronephrosis due to pelvic mass  # anemia pf chronic disease    # HTN    plan:  - repeat renal and bladder US, f.u moderate left hydronephrosis with distended bladder  - continue with low K+ diet  - continue with lokelma PRN  - continue with  oral sodium bicarbonate   - encourage PO hydration  - continue with amlodipine   -  previously declined  intervention (PCN vs ureteral stent) for left hydro until path returns and determination of definitive management of malignancy  - nephro following    # Asymtpomatic bactereuria?   - UA from 30th april was positive , ucx negative      #HTN / HLD  -on amlodipine and rosuvastatin  -c/w home meds  -monitor BP    # Misc  - DVT Prophylaxis: heparin   Injectable (on hold)  - GI Prophylaxis:   - Diet: Diet, Regular  - Activity: Activity - Ambulate with Assistance  - Code Status: Full Do Not Resuscitate    Handoff: Follow up cbc 4pm and BMP at 4,

## 2025-05-09 NOTE — PROGRESS NOTE ADULT - ASSESSMENT
CKD stage 4  hyperkalemia   left hydronephrosis due to pelvic mass  left adnexal mass / heterogeneous uterus / splenic and hepatic masses / focal colon thickening / bone mets  metastatic malignancy of Mullerian origin (ovarian?)  dementia   wt loss / poor PO intake  anemia    HTN    plan:    repeat renal sono with mod left hydro and elevated bladder volume  suggest CIC with PRN bladder scan   gentle hydration for worsening renal function  consider  follow up for ureteral stent vs PCN if  agreeable to full medical measures   lokelma 10g x 1 and PRN  cont oral sodium bicarbonate   cont amlodipine   f/u  re decision on chemotherapy   DNR / DNI  f/u hospice

## 2025-05-10 LAB
ALBUMIN SERPL ELPH-MCNC: 2.7 G/DL — LOW (ref 3.5–5.2)
ALP SERPL-CCNC: 157 U/L — HIGH (ref 30–115)
ALT FLD-CCNC: 49 U/L — HIGH (ref 0–41)
AMPHIPHYSIN AB TITR SER: NEGATIVE — SIGNIFICANT CHANGE UP
ANION GAP SERPL CALC-SCNC: 10 MMOL/L — SIGNIFICANT CHANGE UP (ref 7–14)
AST SERPL-CCNC: 93 U/L — HIGH (ref 0–41)
BASOPHILS # BLD AUTO: 0 K/UL — SIGNIFICANT CHANGE UP (ref 0–0.2)
BASOPHILS NFR BLD AUTO: 0 % — SIGNIFICANT CHANGE UP (ref 0–1)
BILIRUB SERPL-MCNC: 0.3 MG/DL — SIGNIFICANT CHANGE UP (ref 0.2–1.2)
BUN SERPL-MCNC: 30 MG/DL — HIGH (ref 10–20)
CALCIUM SERPL-MCNC: 8.8 MG/DL — SIGNIFICANT CHANGE UP (ref 8.4–10.5)
CHLORIDE SERPL-SCNC: 105 MMOL/L — SIGNIFICANT CHANGE UP (ref 98–110)
CO2 SERPL-SCNC: 23 MMOL/L — SIGNIFICANT CHANGE UP (ref 17–32)
CREAT SERPL-MCNC: 1.4 MG/DL — SIGNIFICANT CHANGE UP (ref 0.7–1.5)
CRMP-5-IGG WESTERN BLOT: NEGATIVE — SIGNIFICANT CHANGE UP
D DIMER BLD IA.RAPID-MCNC: HIGH NG/ML DDU
EGFR: 39 ML/MIN/1.73M2 — LOW
EGFR: 39 ML/MIN/1.73M2 — LOW
EOSINOPHIL # BLD AUTO: 0.38 K/UL — SIGNIFICANT CHANGE UP (ref 0–0.7)
EOSINOPHIL NFR BLD AUTO: 3.5 % — SIGNIFICANT CHANGE UP (ref 0–8)
GLIAL NUC TYPE 1 AB TITR SER: NEGATIVE — SIGNIFICANT CHANGE UP
GLUCOSE SERPL-MCNC: 123 MG/DL — HIGH (ref 70–99)
HCT VFR BLD CALC: 24.1 % — LOW (ref 37–47)
HGB BLD-MCNC: 7.6 G/DL — LOW (ref 12–16)
HU1 AB TITR SER: NEGATIVE — SIGNIFICANT CHANGE UP
HU2 AB TITR SER IF: NEGATIVE — SIGNIFICANT CHANGE UP
HU3 AB TITR SER: NEGATIVE — SIGNIFICANT CHANGE UP
IFA NOTES: SIGNIFICANT CHANGE UP
LACTATE SERPL-SCNC: 1.5 MMOL/L — SIGNIFICANT CHANGE UP (ref 0.7–2)
LYMPHOCYTES # BLD AUTO: 0.66 K/UL — LOW (ref 1.2–3.4)
LYMPHOCYTES # BLD AUTO: 6.1 % — LOW (ref 20.5–51.1)
MAGNESIUM SERPL-MCNC: 1.9 MG/DL — SIGNIFICANT CHANGE UP (ref 1.8–2.4)
MCHC RBC-ENTMCNC: 28.3 PG — SIGNIFICANT CHANGE UP (ref 27–31)
MCHC RBC-ENTMCNC: 31.5 G/DL — LOW (ref 32–37)
MCV RBC AUTO: 89.6 FL — SIGNIFICANT CHANGE UP (ref 81–99)
MONOCYTES # BLD AUTO: 0.57 K/UL — SIGNIFICANT CHANGE UP (ref 0.1–0.6)
MONOCYTES NFR BLD AUTO: 5.3 % — SIGNIFICANT CHANGE UP (ref 1.7–9.3)
NEUTROPHILS # BLD AUTO: 8.88 K/UL — HIGH (ref 1.4–6.5)
NEUTROPHILS NFR BLD AUTO: 82.5 % — HIGH (ref 42.2–75.2)
PARANEOPLASTIC AB SER-IMP: SIGNIFICANT CHANGE UP
PCA-1 AB TITR SER: NEGATIVE — SIGNIFICANT CHANGE UP
PCA-2 AB TITR SER: NEGATIVE — SIGNIFICANT CHANGE UP
PCA-TR AB TITR SER: NEGATIVE — SIGNIFICANT CHANGE UP
PHOSPHATE SERPL-MCNC: 3 MG/DL — SIGNIFICANT CHANGE UP (ref 2.1–4.9)
PLATELET # BLD AUTO: 416 K/UL — HIGH (ref 130–400)
PMV BLD: 11.2 FL — HIGH (ref 7.4–10.4)
POTASSIUM SERPL-MCNC: 5.1 MMOL/L — HIGH (ref 3.5–5)
POTASSIUM SERPL-SCNC: 5.1 MMOL/L — HIGH (ref 3.5–5)
PROT SERPL-MCNC: 5.9 G/DL — LOW (ref 6–8)
RBC # BLD: 2.69 M/UL — LOW (ref 4.2–5.4)
RBC # FLD: 14.1 % — SIGNIFICANT CHANGE UP (ref 11.5–14.5)
SODIUM SERPL-SCNC: 138 MMOL/L — SIGNIFICANT CHANGE UP (ref 135–146)
WBC # BLD: 10.76 K/UL — SIGNIFICANT CHANGE UP (ref 4.8–10.8)
WBC # FLD AUTO: 10.76 K/UL — SIGNIFICANT CHANGE UP (ref 4.8–10.8)

## 2025-05-10 PROCEDURE — 93010 ELECTROCARDIOGRAM REPORT: CPT

## 2025-05-10 PROCEDURE — 99232 SBSQ HOSP IP/OBS MODERATE 35: CPT

## 2025-05-10 RX ORDER — SODIUM ZIRCONIUM CYCLOSILICATE 5 G/5G
10 POWDER, FOR SUSPENSION ORAL ONCE
Refills: 0 | Status: COMPLETED | OUTPATIENT
Start: 2025-05-10 | End: 2025-05-10

## 2025-05-10 RX ORDER — SODIUM CHLORIDE 9 G/1000ML
1000 INJECTION, SOLUTION INTRAVENOUS
Refills: 0 | Status: DISCONTINUED | OUTPATIENT
Start: 2025-05-10 | End: 2025-05-10

## 2025-05-10 RX ORDER — ACETAMINOPHEN 500 MG/5ML
650 LIQUID (ML) ORAL ONCE
Refills: 0 | Status: COMPLETED | OUTPATIENT
Start: 2025-05-10 | End: 2025-05-10

## 2025-05-10 RX ADMIN — Medication 325 MILLIGRAM(S): at 05:08

## 2025-05-10 RX ADMIN — Medication 2 TABLET(S): at 21:15

## 2025-05-10 RX ADMIN — ROSUVASTATIN CALCIUM 5 MILLIGRAM(S): 20 TABLET, FILM COATED ORAL at 21:16

## 2025-05-10 RX ADMIN — Medication 650 MILLIGRAM(S): at 18:32

## 2025-05-10 RX ADMIN — SODIUM CHLORIDE 70 MILLILITER(S): 9 INJECTION, SOLUTION INTRAVENOUS at 18:34

## 2025-05-10 RX ADMIN — POLYETHYLENE GLYCOL 3350 17 GRAM(S): 17 POWDER, FOR SOLUTION ORAL at 05:09

## 2025-05-10 RX ADMIN — POLYETHYLENE GLYCOL 3350 17 GRAM(S): 17 POWDER, FOR SOLUTION ORAL at 18:32

## 2025-05-10 RX ADMIN — Medication 325 MILLIGRAM(S): at 21:15

## 2025-05-10 RX ADMIN — Medication 325 MILLIGRAM(S): at 13:52

## 2025-05-10 RX ADMIN — AMLODIPINE BESYLATE 5 MILLIGRAM(S): 10 TABLET ORAL at 05:08

## 2025-05-10 RX ADMIN — SODIUM ZIRCONIUM CYCLOSILICATE 10 GRAM(S): 5 POWDER, FOR SUSPENSION ORAL at 08:42

## 2025-05-10 NOTE — CHART NOTE - NSCHARTNOTEFT_GEN_A_CORE
I talked to the  at the bedside today.  His wife currently remains very confused unable to make medical decision.  He tells me that he would rather her to be comfortable from now on rather than her going through the suffering of chemotherapy and this would be her wishes if she could decide for her self.  She has not been able to get our of the bed, is very weak. Currently ECOG is 3    Given her advanced/metastatic disease, poor functional status and the wish of the family, it is reasonable to pursue hospice at this time, which is agreeing upon.

## 2025-05-10 NOTE — PROGRESS NOTE ADULT - ATTENDING COMMENTS
Patient is a 75 year old woman recently discharged a few days ago for metastatic cancer of unknown primary origin presented with her  as he is having trouble at the moment caring for her. Patient's PCP referred her to the ED because she complained of back pain but did not complain of pain to her       #Immunodeficiency in the setting setting of advanced age, CKD, suspected metastatic malignancy among other co-morbidities.    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  #Left adnexal mass / heterogeneous uterus / splenic and hepatic masses / focal colon thickening   -  CT A/p w/out contrast in April: lobulated liver contour is consistent with cirrhotic changes. Perihepatic 6.2 x 5.0 cm irregular hypodensity/collection. Moderate left hydronephrosis extending into pelvis, likely obstructed by pelvic mass. Anterior 3.6 cm omental mass, possibly connected to adjacent lobulated pelvic mass.  - MR spine in April: Multiple varying sized lesions demonstrated throughout the osseous structures consistent with metastatic disease. The lesions range from punctiform, iliac and sacral bones and larger lesions which is 2.5 and 3   cm (left iliac bone, T12).None of the lesions are associated with soft tissue masses or spinal stenosis. There are multilevel degenerative changes. Mild bulging L5-S1 with foraminal encroachment. Loss of disc space height at L4-L5 mild destructive changes may be previous infection but there is no evidence for edema or enhancement suggest any active infection. No spinal stenosis. Conus is unremarkable. The osseous lesions enhance. Enhancement pattern is otherwise   unremarkable. Adnexal mass partially included on the study.    - hem/onc on board - rec. palliative chemotx.   Code status:  DNR/DNI.  -Palliative care team on board     #CKD stage 3 - stable renal function  # Left hydronephrosis due to pelvic mass  # Metabolic acidosis due to CKD- continue oral bicarb supplement  - outpatient  f/up       #Anemia of chronic disease , no gross bleeding events, hg 7 today, keep active type and screen, next h/h at 4pm, if drops below 7 will give 1 unit prbc.       #HTN / HLD  -on amlodipine and rosuvastatin  -c/w home meds  -monitor BP    DNR/DNI, overall patient's prognosis is very poor   DVT prophyalxis: Heparin sub q - on hold due to h/h drop   DIET: Regular.     #Progress Note Handoff: Pending h/h at 4 pm, transfuse if hg drops below 7, hold heparin subc. tx, CT chest non con to complete the staging  Family discussion: yes, medical team Disposition: Home__with home care     Total time spent to complete patient's bedside assessment, review medical chart, discuss medical plan of care with covering medical team was more than 35 minutes with >50% of time spent face to face with patient, discussion with patient/family and/or coordination of care
Briefly A/P: 76 yo woman PMH of HTN, HLD, cognitive decline and recently discharged a few days ago for metastatic cancer of unknown primary origin presented with her  as he is having trouble at the moment caring for her. Patient labs on presentation noted to have anemia, elevated cr and hyperkalemia. Hyperkalemia, give Lokelma and monitor. JAMIL/CKD3 likely post renal given left hydronephrosis due to pelvic mass, continue gentle IVF and monitor, seen by nephrology recs appreciated. Metabolic acidosis likely 2/2 JAMIL/CKD continue to monitor. Metastatic malignancy unknown primary follow up biopsy result and hem/onc follow up outpatient. Anemia likely 2/2 malignancy, no active sings of bleeding, continue trending H/H. Cognitive decline/dementia, continue supportive care, PT evaluation and follow up CM for placement. Prognosis poor, seen by palliative care DNR/DNI, plan of care discussed with  by the bedside.
Patient was seen and examined with fellow during inpatient hematology oncology rounds covering service.  Agree as per note.      Diagnosed with metastatic high-grade carcinoma of müllerian origin.  Progressive cognitive decline.  Patient is able to hold full conversation.  However, has issues with short-term memory. 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  She had MRI brain without evidence of metastatic disease in 3/2025.     Continue supportive measures per primary team. Neurology evaluation. Recommend repeating MRI brain with/without contrast.  Family meeting to discuss advanced directives and goals of care.  Palliative care consult.  Will consider chemotherapy as outpatient.  Oncology will follow.
Patient is a 75 year old woman recently discharged a few days ago for metastatic cancer of unknown primary origin presented with her  as he is having trouble at the moment caring for her. Patient's PCP referred her to the ED because she complained of back pain but did not complain of pain to her     # New onset fever 5/8/25 - f/up blood cxs, RVP, CBC in am  - reviewed U/A- wnr    # Urinary retention- started with bedside bladder scans, straight cath prn. tx.    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  #Left adnexal mass / heterogeneous uterus / splenic and hepatic masses / focal colon thickening   # New Right sided Pleural effusion- most likely due to malignancy   #Immunodeficiency in the setting of advanced age, CKD,  metastatic malignancy among other co-morbidities.  -  CT A/p w/out contrast in April: lobulated liver contour is consistent with cirrhotic changes. Perihepatic 6.2 x 5.0 cm irregular hypodensity/collection. Moderate left hydronephrosis extending into pelvis, likely obstructed by pelvic mass. Anterior 3.6 cm omental mass, possibly connected to adjacent lobulated pelvic mass.  - MR spine in April: Multiple varying sized lesions demonstrated throughout the osseous structures consistent with metastatic disease. The lesions range from punctiform, iliac and sacral bones and larger lesions which is 2.5 and 3   cm (left iliac bone, T12).None of the lesions are associated with soft tissue masses or spinal stenosis. There are multilevel degenerative changes. Mild bulging L5-S1 with foraminal encroachment. Loss of disc space height at L4-L5 mild destructive changes may be previous infection but there is no evidence for edema or enhancement suggest any active infection. No spinal stenosis. Conus is unremarkable. The osseous lesions enhance. Enhancement pattern is otherwise   unremarkable. Adnexal mass partially included on the study.  - < from: CT Chest No Cont (05.07.25 @ 13:31) >  IMPRESSION: Right-sided pleural effusion with underlying atelectasis. No suspicious pulmonary nodules < end of copied text >    - hem/onc on board - rec. palliative chemotx.   Code status:  DNR/DNI.  -Palliative care team on board   - Hospice team on board , pending final family decision for further tx. plan     #CKD stage 3 - stable renal function  # Left hydronephrosis due to pelvic mass  # Metabolic acidosis due to CKD- continue oral bicarb supplement  - outpatient  f/up     # Hyperkalemia- post lokelma tx, f/up today's BMP     #Anemia of chronic disease , no gross bleeding events, maintain hg 7 or above      #HTN / HLD  -on amlodipine and rosuvastatin  -c/w home meds  -monitor BP    DNR/DNI, overall patient's prognosis is very poor   DVT prophyalxis: Heparin on hold   DIET: Regular.     #Progress Note Handoff: Pending final decision from  for further tx. plan for the patient. Fever work up , monitor bedside bladder scans with straight cath prn. tx., daily CBC, BMP  Family discussion: current medical plan of tx. d/w patient's  by bedside  Disposition: to be determined     Total time spent to complete patient's bedside assessment, review medical chart, discuss medical plan of care with covering medical team was more than 35 minutes with >50% of time spent face to face with patient, discussion with patient/family and/or coordination of care .
Patient is a 75 year old woman recently discharged a few days ago for metastatic cancer of unknown primary origin presented with her  as he is having trouble at the moment caring for her. 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  #Left adnexal mass / heterogeneous uterus / splenic and hepatic masses / focal colon thickening   # New Right sided Pleural effusion- most likely due to malignancy   #Immunodeficiency in the setting of advanced age, CKD,  metastatic malignancy among other co-morbidities.  -  CT A/p w/out contrast in April: lobulated liver contour is consistent with cirrhotic changes. Perihepatic 6.2 x 5.0 cm irregular hypodensity/collection. Moderate left hydronephrosis extending into pelvis, likely obstructed by pelvic mass. Anterior 3.6 cm omental mass, possibly connected to adjacent lobulated pelvic mass.  - MR spine in April: Multiple varying sized lesions demonstrated throughout the osseous structures consistent with metastatic disease. The lesions range from punctiform, iliac and sacral bones and larger lesions which is 2.5 and 3   cm (left iliac bone, T12).None of the lesions are associated with soft tissue masses or spinal stenosis. There are multilevel degenerative changes. Mild bulging L5-S1 with foraminal encroachment. Loss of disc space height at L4-L5 mild destructive changes may be previous infection but there is no evidence for edema or enhancement suggest any active infection. No spinal stenosis. Conus is unremarkable. The osseous lesions enhance. Enhancement pattern is otherwise   unremarkable. Adnexal mass partially included on the study.  - < from: CT Chest No Cont (05.07.25 @ 13:31) >  IMPRESSION: Right-sided pleural effusion with underlying atelectasis. No suspicious pulmonary nodules < end of copied text >    - hem/onc on board - rec. palliative chemotx.   Code status:  DNR/DNI.  -Palliative care team on board   - 5/8/25- GOC was d/w patient's , patient's poor prognosis was d/w family, awaiting Hospice evaluation, will f/up for final decision for further tx. plan.     #CKD stage 3 - stable renal function  # Left hydronephrosis due to pelvic mass  # Metabolic acidosis due to CKD- continue oral bicarb supplement  - outpatient  f/up       #Anemia of chronic disease , no gross bleeding events, maintain hg 7 or above      #HTN / HLD  -on amlodipine and rosuvastatin  -c/w home meds  -monitor BP    DNR/DNI, overall patient's prognosis is very poor   DVT prophyalxis: Heparin sub q - on hold due to h/h drop   DIET: Regular.     #Progress Note Handoff: post GOC with family today, pending Hospice evaluation, will f/up for final decision, continue current tx.   Family discussion: current medical plan of tx. d/w patient's  by bedside  Disposition: to be determined     Total time spent to complete patient's bedside assessment, review medical chart, discuss medical plan of care with covering medical team was more than 35 minutes with >50% of time spent face to face with patient, discussion with patient/family and/or coordination of care .
Patient is a 75 year old woman recently discharged a few days ago for metastatic cancer of unknown primary origin presented with her  as he is having trouble at the moment caring for her. Patient's PCP referred her to the ED because she complained of back pain but did not complain of pain to her     # New onset fever 5/8/25 - f/up blood cxs, RVP, CBC in am  - reviewed U/A- wnr    # Urinary retention- started with bedside bladder scans, straight cath prn. tx.    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  #Left adnexal mass / heterogeneous uterus / splenic and hepatic masses / focal colon thickening   # New Right sided Pleural effusion- most likely due to malignancy   #Immunodeficiency in the setting of advanced age, CKD,  metastatic malignancy among other co-morbidities.  -  CT A/p w/out contrast in April: lobulated liver contour is consistent with cirrhotic changes. Perihepatic 6.2 x 5.0 cm irregular hypodensity/collection. Moderate left hydronephrosis extending into pelvis, likely obstructed by pelvic mass. Anterior 3.6 cm omental mass, possibly connected to adjacent lobulated pelvic mass.  - MR spine in April: Multiple varying sized lesions demonstrated throughout the osseous structures consistent with metastatic disease. The lesions range from punctiform, iliac and sacral bones and larger lesions which is 2.5 and 3   cm (left iliac bone, T12).None of the lesions are associated with soft tissue masses or spinal stenosis. There are multilevel degenerative changes. Mild bulging L5-S1 with foraminal encroachment. Loss of disc space height at L4-L5 mild destructive changes may be previous infection but there is no evidence for edema or enhancement suggest any active infection. No spinal stenosis. Conus is unremarkable. The osseous lesions enhance. Enhancement pattern is otherwise   unremarkable. Adnexal mass partially included on the study.  - < from: CT Chest No Cont (05.07.25 @ 13:31) >  IMPRESSION: Right-sided pleural effusion with underlying atelectasis. No suspicious pulmonary nodules < end of copied text >    - hem/onc on board - rec. palliative chemotx.   Code status:  DNR/DNI.  -Palliative care team on board   - Hospice team on board , pending final family decision for further tx. plan     #CKD stage 3 - stable renal function  # Left hydronephrosis due to pelvic mass  # Metabolic acidosis due to CKD- continue oral bicarb supplement  - outpatient  f/up       #Anemia of chronic disease , no gross bleeding events, maintain hg 7 or above      #HTN / HLD  -on amlodipine and rosuvastatin  -c/w home meds  -monitor BP    DNR/DNI, overall patient's prognosis is very poor   DVT prophyalxis: Heparin on hold   DIET: Regular.     #Progress Note Handoff: Pending final decision from  for further tx. plan for the patient. Fever work up , f/up blood cxs, U/A reviewed , f/up RVP, CBC in am , monitor bedside bladder scans with straight cath prn. tx., post lokelma tx. for hyperkalemia, next BMP at 4 pm today   Family discussion: current medical plan of tx. d/w patient's  by bedside  Disposition: to be determined     Total time spent to complete patient's bedside assessment, review medical chart, discuss medical plan of care with covering medical team was more than 35 minutes with >50% of time spent face to face with patient, discussion with patient/family and/or coordination of care .

## 2025-05-10 NOTE — PROGRESS NOTE ADULT - SUBJECTIVE AND OBJECTIVE BOX
[facility] FOLLOW UP NOTE  --------------------------------------------------------------------------------  24 hour events/subjective: Patient examined. Appears comfortable.    PAST HISTORY  --------------------------------------------------------------------------------  No significant changes to PMH, PSH, FHx, SHx, unless otherwise noted    ALLERGIES & MEDICATIONS  --------------------------------------------------------------------------------  Allergies    latex (Rash)  penicillin (Unknown)    Standing Inpatient Medications  amLODIPine   Tablet 5 milliGRAM(s) Oral daily  lactated ringers. 1000 milliLiter(s) IV Continuous <Continuous>  polyethylene glycol 3350 17 Gram(s) Oral two times a day  rosuvastatin 5 milliGRAM(s) Oral at bedtime  senna 2 Tablet(s) Oral at bedtime  sodium bicarbonate 325 milliGRAM(s) Oral every 8 hours    PRN Inpatient Medications  acetaminophen     Tablet .. 650 milliGRAM(s) Oral every 6 hours PRN  bisacodyl Suppository 10 milliGRAM(s) Rectal daily PRN  oxyCODONE    IR 5 milliGRAM(s) Oral every 6 hours PRN    VITALS/PHYSICAL EXAM  --------------------------------------------------------------------------------  T(C): 36.8 (05-10-25 @ 07:47), Max: 38.5 (05-09-25 @ 22:00)  HR: 76 (05-10-25 @ 07:47) (72 - 85)  BP: 115/65 (05-10-25 @ 07:47) (113/69 - 130/76)  RR: 18 (05-10-25 @ 07:47) (18 - 18)  SpO2: 96% (05-10-25 @ 07:47) (94% - 96%)    05-09-25 @ 07:01  -  05-10-25 @ 07:00  --------------------------------------------------------  IN: 0 mL / OUT: 600 mL / NET: -600 mL    Physical Exam:  	Gen: NAD  	Pulm: CTA B/L  	CV: RRR, S1S2  	Abd: +BS, soft, nontender/nondistended  	: No suprapubic tenderness  	LE: Warm, no edema    LABS/STUDIES  --------------------------------------------------------------------------------              7.2    8.81  >-----------<  374      [05-09-25 @ 17:58]              23.3     137  |  104  |  32  ----------------------------<  115      [05-09-25 @ 17:58]  5.5   |  22  |  1.6        Ca     8.3     [05-09-25 @ 17:58]      Mg     2.3     [05-09-25 @ 07:46]    TPro  5.9  /  Alb  2.9  /  TBili  0.3  /  DBili  x   /  AST  109  /  ALT  54  /  AlkPhos  131  [05-09-25 @ 07:46]    Creatinine Trend:  SCr 1.6 [05-09 @ 17:58]  SCr 1.8 [05-09 @ 07:46]  SCr 1.5 [05-08 @ 06:56]  SCr 1.6 [05-07 @ 06:57]  SCr 1.7 [05-06 @ 07:29]    Urinalysis - [05-09-25 @ 17:58]      Color  / Appearance  / SG  / pH       Gluc 115 / Ketone   / Bili  / Urobili        Blood  / Protein  / Leuk Est  / Nitrite       RBC  / WBC  / Hyaline  / Gran  / Sq Epi  / Non Sq Epi  / Bacteria     Iron 15, TIBC 158, %sat 9      [04-25-25 @ 08:27]  Ferritin 575      [04-25-25 @ 08:27]  TSH 1.33      [04-25-25 @ 08:27]  Lipid: chol 106, TG 76, HDL 46, LDL --      [04-25-25 @ 08:27]    TONY: titer 1:320, pattern Homogeneous      [04-25-25 @ 08:27]  dsDNA 1      [04-25-25 @ 08:27]  C3 Complement 149      [04-25-25 @ 08:27]  C4 Complement 37      [04-25-25 @ 08:27]  ANCA: cANCA Negative, pANCA Negative, atypical ANCA Indeterminate Method interference due to TONY Fluorescence      [04-24-25 @ 19:50]  anti-GBM 0.7      [04-25-25 @ 08:27]  Syphilis Screen (Treponema Pallidum Ab) Negative      [04-25-25 @ 08:27]  Free Light Chains: kappa 11.06, lambda 6.65, ratio = 1.66      [04-25 @ 08:27]  Immunofixation Serum:   One Weak IgG Kappa Band and One Weak IgG Lambda Band Identified    Reference Range: None Detected      [04-25-25 @ 08:27]  SPEP Interpretation: Two Weak Gamma-Migrating Paraproteins Identified      [04-25-25 @ 08:27]

## 2025-05-10 NOTE — CHART NOTE - NSCHARTNOTEFT_GEN_A_CORE
Pt has metastatic high-grade carcinoma of müllerian origin with progressive cognitive decline. AOx2-3. Hospice planing. DVT prophylaxis has been on hold as hb has been on borderline.    RN called for  above than baseline, on PRN O2 2L. No any complain currently.   D-dimer 80122 also high likely from the cancer.    does not wants PE workup for now but agreed to scan if respiratory status worsens.  Follow up EKG. Pt has metastatic high-grade carcinoma of müllerian origin with progressive cognitive decline. AOx2-3. Hospice planing. DVT prophylaxis has been on hold as hb has been on borderline.    RN called for  above than baseline, on PRN O2 2L. No any complain currently.   D-dimer 04741 also high likely from the cancer.    who is a health care proxy ,does not wants PE workup for now but agreed to scan if respiratory status worsens.  Follow up EKG. Pt has metastatic high-grade carcinoma of müllerian origin with progressive cognitive decline. AOx2-3. Hospice planing, DNI/DNR with trial of NIV. DVT prophylaxis has been on hold as hb has been on borderline.    RN called for  above than baseline, on PRN O2 2L. No any complain currently.   D-dimer 74767 also high likely from the cancer.    who is a health care proxy ,does not wants PE workup for now but agreed to scan if respiratory status worsens.  Follow up EKG. Pt has metastatic high-grade carcinoma of müllerian origin with progressive cognitive decline. AOx2-3. Hospice planing, DNI/DNR with trial of NIV. DVT prophylaxis has been on hold as hb has been on borderline.    RN called for  above than baseline, on PRN O2 2L. No any complain currently.   D-dimer 72021 also high likely from the cancer.    who is a health care proxy ,does not wants PE workup for now but wants to scan if respiratory status worsens.  Follow up EKG. Pt has metastatic high-grade carcinoma of müllerian origin with progressive cognitive decline. AOx2-3. Hospice planing, DNI/DNR with trial of NIV. DVT prophylaxis has been on hold as hb has been on borderline.    RN called for  above than baseline, on PRN O2 2L. No any complain currently.   D-dimer 84851 also high likely from the cancer. Discussed with  at bedside.   who is a health care proxy ,does not wants PE workup for now but wants to scan if respiratory status worsens.  Follow up EKG. Pt has metastatic high-grade carcinoma of müllerian origin with progressive cognitive decline. AOx2-3. Hospice planing, DNI/DNR with trial of NIV. DVT prophylaxis has been on hold as hb has been on borderline.    RN called for  above than baseline, on PRN O2 2L. didnot complain of any symptoms to me.   D-dimer 97068 also high likely from the cancer. Discussed with  at bedside.   who is a health care proxy ,does not wants PE workup for now but wants to scan if respiratory status worsens.  Follow up EKG. Pt has metastatic high-grade carcinoma of müllerian origin with progressive cognitive decline. AOx2-3. Hospice planing, DNI/DNR with trial of NIV. DVT prophylaxis has been on hold as hb has been on borderline.    RN called for  above than baseline, on PRN O2 2L. did not complain of any symptoms to me.   D-dimer 95349 also high likely from the cancer. Discussed with  at bedside.   who is a health care proxy ,does not wants PE workup for now but wants to scan if respiratory status worsens.  ekg f/u Pt has metastatic high-grade carcinoma of müllerian origin with progressive cognitive decline. AOx2-3. Hospice planing, DNI/DNR with trial of NIV. DVT prophylaxis has been on hold as hb has been on borderline.    RN called for  above than baseline, on PRN O2 2L. did not complain of any symptoms to me. decrease air entry to right lower lobe (recent CT chest showed right side pleural effusion with atelectasis)  D-dimer 10405 also high likely from the cancer. Discussed with  at bedside.   who is a health care proxy ,does not wants PE workup for now but wants to scan if respiratory status worsens.  ekg f/u

## 2025-05-10 NOTE — PROGRESS NOTE ADULT - ASSESSMENT
CKD stage 4  hyperkalemia   left hydronephrosis due to pelvic mass  left adnexal mass / heterogeneous uterus / splenic and hepatic masses / focal colon thickening / bone mets  metastatic malignancy of Mullerian origin (ovarian?)  dementia   wt loss / poor PO intake  anemia    HTN    plan:    suggest CIC with PRN bladder scan   encourage oral intake - gentle hydration if creatinine worsens  consider  follow up for ureteral stent vs PCN if  agreeable to full medical measures   lokelma PRN  cont oral sodium bicarbonate   cont amlodipine   f/u  re decision on chemotherapy   DNR / DNI  f/u hospice

## 2025-05-10 NOTE — PROGRESS NOTE ADULT - SUBJECTIVE AND OBJECTIVE BOX
SOCORRO JOHN 75y Female  MRN#: [id number]     Hospital Day: 5d    Pt is currently admitted with the primary diagnosis of  Fever due to unspecified condition        SUBJECTIVE     Overnight events  None    Subjective complaints  Pt was evaluated this am. Patient denied any active complaints and per patient his symptoms are improving                                            ----------------------------------------------------------  OBJECTIVE  PAST MEDICAL & SURGICAL HISTORY  HTN (hypertension)    HLD (hyperlipidemia)    Cataract    S/P liudmila    H/O lumpectomy  left    History of dilation and curettage    Status post cataract extraction and insertion of intraocular lens, left                                              -----------------------------------------------------------  ALLERGIES:  latex (Rash)  penicillin (Unknown)                                            ------------------------------------------------------------    HOME MEDICATIONS  Home Medications:                           MEDICATIONS:  STANDING MEDICATIONS  amLODIPine   Tablet 5 milliGRAM(s) Oral daily  lactated ringers. 1000 milliLiter(s) IV Continuous <Continuous>  polyethylene glycol 3350 17 Gram(s) Oral two times a day  rosuvastatin 5 milliGRAM(s) Oral at bedtime  senna 2 Tablet(s) Oral at bedtime  sodium bicarbonate 325 milliGRAM(s) Oral every 8 hours    PRN MEDICATIONS  acetaminophen     Tablet .. 650 milliGRAM(s) Oral every 6 hours PRN  bisacodyl Suppository 10 milliGRAM(s) Rectal daily PRN  oxyCODONE    IR 5 milliGRAM(s) Oral every 6 hours PRN                                            ------------------------------------------------------------  VITAL SIGNS: Last 24 Hours  T(C): 36.5 (09 May 2025 05:01), Max: 38.7 (08 May 2025 20:31)  T(F): 97.7 (09 May 2025 05:01), Max: 101.6 (08 May 2025 20:31)  HR: 79 (09 May 2025 08:09) (75 - 83)  BP: 136/69 (09 May 2025 05:01) (132/68 - 137/69)  BP(mean): 92 (08 May 2025 20:31) (89 - 92)  RR: 18 (09 May 2025 05:01) (18 - 19)  SpO2: 96% (09 May 2025 08:09) (92% - 99%)      25 @ 07:01  -  25 @ 07:00  --------------------------------------------------------  IN: 0 mL / OUT: 700 mL / NET: -700 mL                                             --------------------------------------------------------------  LABS:                        7.2    8.94  )-----------( 388      ( 09 May 2025 07:46 )             23.5         143  |  109  |  37[H]  ----------------------------<  98  5.8[H]   |  22  |  1.8[H]    Ca    8.7      09 May 2025 07:46  Mg     2.3     -    TPro  5.9[L]  /  Alb  2.9[L]  /  TBili  0.3  /  DBili  x   /  AST  109[H]  /  ALT  54[H]  /  AlkPhos  131[H]        Urinalysis Basic - ( 09 May 2025 10:10 )    Color: Yellow / Appearance: Clear / S.021 / pH: x  Gluc: x / Ketone: Negative mg/dL  / Bili: Negative / Urobili: 1.0 mg/dL   Blood: x / Protein: 100 mg/dL / Nitrite: Negative   Leuk Esterase: Negative / RBC: 0 /HPF / WBC 2 /HPF   Sq Epi: x / Non Sq Epi: 5 /HPF / Bacteria: Negative /HPF              Culture - Urine (collected 07 May 2025 22:35)  Source: Clean Catch None  Final Report (08 May 2025 23:47):    <10,000 CFU/mL Normal Urogenital Willa    Urinalysis with Rflx Culture (collected 07 May 2025 22:35)                                                    -------------------------------------------------------------  RADIOLOGY:                                            --------------------------------------------------------------    PHYSICAL EXAM:  GENERAL: NAD, lying in bed comfortably  HEAD:  Atraumatic, Normocephalic  EYES: EOMI, conjunctiva and sclera clear  ENT: Moist mucous membranes  NECK: Supple, No JVD  CHEST/LUNG: right lower lobe crakles present  HEART: regular rate and rhythm; No murmurs, rubs, or gallops  ABDOMEN: Bowel sounds present; Soft, Nontender, Nondistended.    EXTREMITIES: Warm. No clubbing, cyanosis, or edema  NERVOUS SYSTEM:  Alert & Oriented X3. No focal deficits   SKIN: No rashes or lesions                                           --------------------------------------------------------------

## 2025-05-10 NOTE — PROGRESS NOTE ADULT - ASSESSMENT
75 year old woman recently discharged a few days ago for metastatic cancer of unknown primary origin presented with her  as he is having trouble at the moment caring for her. Patient's PCP referred her to the ED because she complained of back pain but did not complain of pain to her     # Immunodeficiency in the setting setting of advanced age, CKD, suspected metastatic malignancy among other co-morbidities.    # Recurrent Fever at overnight 101.6 s/p IV tylenol  - US abdomen: Moderate left hydronephrosis.Distended bladder with volume of 643 cc. > straight cath done (5/8/25), was constipated yesterday >passed bowel after lactulose enema  - UA negative, mrsa negative, flu/covid negative  - chest xray right lower lobe opacity  - Follow up blood cx   - informed hem/on regarding change in clinical status  - s/p gentle fluid  - bladder scan q8      # Urinary retention   - straight cath 3 times total   - UA negative   - bladder scan q8    # Normocytic anemia: anemia of chronic disease  - total iron 15, Saturation 9, ferritin 575  - b12 and folate normal  - hb 7>7.3>7.6>7.2>7.2  - monitor cbc BID  - dvt ppx held until hemoglobin stablizes,  having on going discussion with hospice    # Cognitive decline (aox1-2)  - Dementia vs paraneoplastic syndrome  - as per  had noted mild decline in Jan, significant decline since March,  mental status stable from last admission  - MRI brain done in March showed no acute pathology  - c/w home meds  - as per hem/onc repeat MRI head    # 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  # left adnexal mass / heterogeneous uterus / splenic and hepatic masses / focal colon thickening   # Peritoneal mass biopsy shows  - POSITIVE FOR MALIGNANT CELLS- Metastatic high grade carcinoma of Mullerian origin.  - : 9000, CEA 36  -  CT A/p w/out contrast in April: lobulated liver contour is consistent with cirrhotic changes. Perihepatic 6.2 x 5.0 cm irregular hypodensity/collection. Moderate left hydronephrosis extending into pelvis, likely obstructed by pelvic mass. Anterior 3.6 cm omental mass, possibly connected to adjacent lobulated pelvic mass.  - MR spine in April: Multiple varying sized lesions demonstrated throughout the osseous structures consistent with metastatic disease. The lesions range from punctiform, iliac and sacral bones and larger lesions which is 2.5 and 3 cm (left iliac bone, T12).None of the lesions are associated with soft tissue masses or spinal stenosis. There are multilevel degenerative changes. Mild bulging L5-S1 with foraminal encroachment. Loss of disc space height at L4-L5 mild destructive changes may be previous infection but there is no evidence for edema or enhancement suggest any active infection. No spinal stenosis. Conus is unremarkable. The osseous lesions enhance. Enhancement pattern is otherwise,  didnt reveal any active infection, shows diffuse bone lesions  unremarkable. Adnexal mass partially included on the study.  - as per Gyn/onc Not a surgery candidate at present as she is unlikely to have a complete resection, will need 3-4 cycles of chemotherapy, followed by possible IDS if there is a good response.Would also recommend a L sided stent be placed, as she is going to receive chemotherapy.  - Follow up hem/onc, saying OP chemotherapy   - pain controlled currently from tylenol for prn for mild pain and oxycodone 5 mg for moderate/severe pain  - continue with bowel regimen, stool burden seen, no bowel obstruction in xray KUB> pt not compliant with Bowel regimen.   - f/u biopsy from 4/29 POSITIVE FOR MALIGNANT CELLS- Metastatic high grade carcinoma of Mullerian origin.  - Goals of care discussed with .  reaffirmed DNR/DNI with trial of NIV   - palliative following    # Placement  -  reports that he can not take of care at home  - Case management eval for placement  - PT eval, recommending rehab vs home with assistance  -  wants to d/c home with homecare and a hospital bed.    # CKD stage 5   # Hyperkalemia, mild  # Metabolic acidosis, mild   # left hydronephrosis due to pelvic mass  # anemia pf chronic disease    # HTN    plan:  - repeat renal and bladder US, f.u moderate left hydronephrosis with distended bladder  - continue with low K+ diet  - continue with lokelma PRN  - continue with  oral sodium bicarbonate   - encourage PO hydration  - continue with amlodipine   -  previously declined  intervention (PCN vs ureteral stent) for left hydro until path returns and determination of definitive management of malignancy  - nephro following    # Asymtpomatic bactereuria?   - UA from 30th april was positive , ucx negative      #HTN / HLD  -on amlodipine and rosuvastatin  -c/w home meds  -monitor BP    # Misc  - DVT Prophylaxis: heparin   Injectable (on hold)  - GI Prophylaxis:   - Diet: Diet, Regular  - Activity: Activity - Ambulate with Assistance  - Code Status: Full Do Not Resuscitate    Handoff: Follow up cbc 4pm and BMP at 4,

## 2025-05-11 LAB
ALBUMIN SERPL ELPH-MCNC: 2.9 G/DL — LOW (ref 3.5–5.2)
ALP SERPL-CCNC: 140 U/L — HIGH (ref 30–115)
ALT FLD-CCNC: 42 U/L — HIGH (ref 0–41)
ANION GAP SERPL CALC-SCNC: 15 MMOL/L — HIGH (ref 7–14)
AST SERPL-CCNC: 78 U/L — HIGH (ref 0–41)
BILIRUB SERPL-MCNC: 0.4 MG/DL — SIGNIFICANT CHANGE UP (ref 0.2–1.2)
BUN SERPL-MCNC: 28 MG/DL — HIGH (ref 10–20)
CALCIUM SERPL-MCNC: 8.3 MG/DL — LOW (ref 8.4–10.5)
CHLORIDE SERPL-SCNC: 104 MMOL/L — SIGNIFICANT CHANGE UP (ref 98–110)
CO2 SERPL-SCNC: 21 MMOL/L — SIGNIFICANT CHANGE UP (ref 17–32)
CREAT SERPL-MCNC: 1.4 MG/DL — SIGNIFICANT CHANGE UP (ref 0.7–1.5)
CULTURE RESULTS: SIGNIFICANT CHANGE UP
CULTURE RESULTS: SIGNIFICANT CHANGE UP
EGFR: 39 ML/MIN/1.73M2 — LOW
EGFR: 39 ML/MIN/1.73M2 — LOW
GLUCOSE SERPL-MCNC: 115 MG/DL — HIGH (ref 70–99)
HCT VFR BLD CALC: 22.9 % — LOW (ref 37–47)
HGB BLD-MCNC: 7.2 G/DL — LOW (ref 12–16)
MAGNESIUM SERPL-MCNC: 2.1 MG/DL — SIGNIFICANT CHANGE UP (ref 1.8–2.4)
MCHC RBC-ENTMCNC: 27.8 PG — SIGNIFICANT CHANGE UP (ref 27–31)
MCHC RBC-ENTMCNC: 31.4 G/DL — LOW (ref 32–37)
MCV RBC AUTO: 88.4 FL — SIGNIFICANT CHANGE UP (ref 81–99)
NRBC BLD AUTO-RTO: 0 /100 WBCS — SIGNIFICANT CHANGE UP (ref 0–0)
PLATELET # BLD AUTO: 412 K/UL — HIGH (ref 130–400)
PMV BLD: 11.2 FL — HIGH (ref 7.4–10.4)
POTASSIUM SERPL-MCNC: 5.1 MMOL/L — HIGH (ref 3.5–5)
POTASSIUM SERPL-SCNC: 5.1 MMOL/L — HIGH (ref 3.5–5)
PROT SERPL-MCNC: 5.8 G/DL — LOW (ref 6–8)
RBC # BLD: 2.59 M/UL — LOW (ref 4.2–5.4)
RBC # FLD: 14.1 % — SIGNIFICANT CHANGE UP (ref 11.5–14.5)
SODIUM SERPL-SCNC: 140 MMOL/L — SIGNIFICANT CHANGE UP (ref 135–146)
SPECIMEN SOURCE: SIGNIFICANT CHANGE UP
SPECIMEN SOURCE: SIGNIFICANT CHANGE UP
WBC # BLD: 11.5 K/UL — HIGH (ref 4.8–10.8)
WBC # FLD AUTO: 11.5 K/UL — HIGH (ref 4.8–10.8)

## 2025-05-11 PROCEDURE — 99232 SBSQ HOSP IP/OBS MODERATE 35: CPT

## 2025-05-11 RX ADMIN — Medication 650 MILLIGRAM(S): at 22:57

## 2025-05-11 RX ADMIN — AMLODIPINE BESYLATE 5 MILLIGRAM(S): 10 TABLET ORAL at 05:23

## 2025-05-11 RX ADMIN — Medication 650 MILLIGRAM(S): at 20:30

## 2025-05-11 RX ADMIN — Medication 2 TABLET(S): at 21:17

## 2025-05-11 RX ADMIN — POLYETHYLENE GLYCOL 3350 17 GRAM(S): 17 POWDER, FOR SOLUTION ORAL at 05:24

## 2025-05-11 RX ADMIN — POLYETHYLENE GLYCOL 3350 17 GRAM(S): 17 POWDER, FOR SOLUTION ORAL at 17:16

## 2025-05-11 RX ADMIN — Medication 325 MILLIGRAM(S): at 05:23

## 2025-05-11 RX ADMIN — Medication 325 MILLIGRAM(S): at 13:14

## 2025-05-11 RX ADMIN — OXYCODONE HYDROCHLORIDE 5 MILLIGRAM(S): 30 TABLET ORAL at 17:16

## 2025-05-11 RX ADMIN — Medication 325 MILLIGRAM(S): at 21:16

## 2025-05-11 RX ADMIN — ROSUVASTATIN CALCIUM 5 MILLIGRAM(S): 20 TABLET, FILM COATED ORAL at 21:16

## 2025-05-11 NOTE — PROGRESS NOTE ADULT - SUBJECTIVE AND OBJECTIVE BOX
[facility] FOLLOW UP NOTE  --------------------------------------------------------------------------------  24 hour events/subjective: Patient examined. Appears comfortable.    PAST HISTORY  --------------------------------------------------------------------------------  No significant changes to PMH, PSH, FHx, SHx, unless otherwise noted    ALLERGIES & MEDICATIONS  --------------------------------------------------------------------------------  Allergies    latex (Rash)  penicillin (Unknown)    Standing Inpatient Medications  amLODIPine   Tablet 5 milliGRAM(s) Oral daily  lactated ringers. 1000 milliLiter(s) IV Continuous <Continuous>  polyethylene glycol 3350 17 Gram(s) Oral two times a day  rosuvastatin 5 milliGRAM(s) Oral at bedtime  senna 2 Tablet(s) Oral at bedtime  sodium bicarbonate 325 milliGRAM(s) Oral every 8 hours    PRN Inpatient Medications  acetaminophen     Tablet .. 650 milliGRAM(s) Oral every 6 hours PRN  bisacodyl Suppository 10 milliGRAM(s) Rectal daily PRN  oxyCODONE    IR 5 milliGRAM(s) Oral every 6 hours PRN    VITALS/PHYSICAL EXAM  --------------------------------------------------------------------------------  T(C): 37.4 (05-11-25 @ 04:11), Max: 37.9 (05-10-25 @ 17:53)  HR: 98 (05-11-25 @ 04:11) (90 - 102)  BP: 149/66 (05-11-25 @ 04:11) (146/72 - 156/79)  RR: 18 (05-11-25 @ 04:11) (18 - 18)  SpO2: 95% (05-11-25 @ 04:11) (95% - 97%)    05-10-25 @ 07:01  -  05-11-25 @ 07:00  --------------------------------------------------------  IN: 0 mL / OUT: 900 mL / NET: -900 mL    Physical Exam:  	Gen: NAD  	Pulm: CTA B/L  	CV: RRR, S1S2  	Abd: +BS, soft, nontender/nondistended  	: No suprapubic tenderness  	LE: Warm, no edema    LABS/STUDIES  --------------------------------------------------------------------------------              7.2    11.50 >-----------<  412      [05-11-25 @ 07:47]              22.9     140  |  104  |  28  ----------------------------<  115      [05-11-25 @ 07:47]  5.1   |  21  |  1.4        Ca     8.3     [05-11-25 @ 07:47]      Mg     2.1     [05-11-25 @ 07:47]      Phos  3.0     [05-10-25 @ 18:25]    TPro  5.8  /  Alb  2.9  /  TBili  0.4  /  DBili  x   /  AST  78  /  ALT  42  /  AlkPhos  140  [05-11-25 @ 07:47]    Creatinine Trend:  SCr 1.4 [05-11 @ 07:47]  SCr 1.4 [05-10 @ 18:25]  SCr 1.6 [05-09 @ 17:58]  SCr 1.8 [05-09 @ 07:46]  SCr 1.5 [05-08 @ 06:56]    Urinalysis - [05-11-25 @ 07:47]      Color  / Appearance  / SG  / pH       Gluc 115 / Ketone   / Bili  / Urobili        Blood  / Protein  / Leuk Est  / Nitrite       RBC  / WBC  / Hyaline  / Gran  / Sq Epi  / Non Sq Epi  / Bacteria     Iron 15, TIBC 158, %sat 9      [04-25-25 @ 08:27]  Ferritin 575      [04-25-25 @ 08:27]  TSH 1.33      [04-25-25 @ 08:27]  Lipid: chol 106, TG 76, HDL 46, LDL --      [04-25-25 @ 08:27]    TONY: titer 1:320, pattern Homogeneous      [04-25-25 @ 08:27]  dsDNA 1      [04-25-25 @ 08:27]  C3 Complement 149      [04-25-25 @ 08:27]  C4 Complement 37      [04-25-25 @ 08:27]  ANCA: cANCA Negative, pANCA Negative, atypical ANCA Indeterminate Method interference due to TONY Fluorescence      [04-24-25 @ 19:50]  anti-GBM 0.7      [04-25-25 @ 08:27]  Syphilis Screen (Treponema Pallidum Ab) Negative      [04-25-25 @ 08:27]  Free Light Chains: kappa 11.06, lambda 6.65, ratio = 1.66      [04-25 @ 08:27]  Immunofixation Serum:   One Weak IgG Kappa Band and One Weak IgG Lambda Band Identified    Reference Range: None Detected      [04-25-25 @ 08:27]  SPEP Interpretation: Two Weak Gamma-Migrating Paraproteins Identified      [04-25-25 @ 08:27]

## 2025-05-11 NOTE — PROGRESS NOTE ADULT - SUBJECTIVE AND OBJECTIVE BOX
[id number]      Patient was evaluated and examined  by bedside, no active complains         REVIEW OF SYSTEMS: unable to obtain, patient is forgetful         T(C): , Max: 37.9 (05-10-25 @ 17:53)  HR: 98 (05-11-25 @ 04:11)  BP: 149/66 (05-11-25 @ 04:11)  RR: 18 (05-11-25 @ 04:11)  SpO2: 95% (05-11-25 @ 04:11)  CAPILLARY BLOOD GLUCOSE          PHYSICAL EXAM:  GENERAL: NAD, Awake, patient is laying comfortably in bed  HEENT: AT, NC  LUNG: good breath sounds B/L  CVS: normal S1, S2, RRR, NO M/G/R  ABDOMEN: soft, bowel sounds present, normoactive in all 4 quadrants, non-tender, non-distended  EXT: no E/C/C, positive PP b/l extremities  NEURO: generalized body weakness  SKIN: no rash, no ecchymosis        LAB  CBC  Date: 05-11-25 @ 07:47  Mean cell Ptqplwokge68.8  Mean cell Hemoglobin Conc31.4  Mean cell Volum 88.4  Platelet count-Automate 412  RBC Count 2.59  Red Cell Distrib Width14.1  WBC Count11.50  % Albumin, Urine--  Hematocrit 22.9  Hemoglobin 7.2  CBC  Date: 05-10-25 @ 18:25  Mean cell Iwtekwpamg31.3  Mean cell Hemoglobin Conc31.5  Mean cell Volum 89.6  Platelet count-Automate 416  RBC Count 2.69  Red Cell Distrib Width14.1  WBC Count10.76  % Albumin, Urine--  Hematocrit 24.1  Hemoglobin 7.6  CBC  Date: 05-09-25 @ 17:58  Mean cell Folqhdgrqk47.1  Mean cell Hemoglobin Conc30.9  Mean cell Volum 91.0  Platelet count-Automate 374  RBC Count 2.56  Red Cell Distrib Width14.3  WBC Count8.81  % Albumin, Urine--  Hematocrit 23.3  Hemoglobin 7.2    BMP  05-11-25 @ 07:47  Blood Gas Arterial-Calcium,Ionized--  Blood Urea Nitrogen, Serum 28 mg/dL[H] [10 - 20]  Carbon Dioxide, Serum21 mmol/L [17 - 32]  Chloride, Lyibq828 mmol/L [98 - 110]  Creatinie, Serum1.4 mg/dL [0.7 - 1.5]  Glucose, Mqaiw726 mg/dL[H] [70 - 99]  Potassium, Serum5.1 mmol/L[H] [3.5 - 5.0]  Sodium, Serum 140 mmol/L [135 - 146]  BMP  05-10-25 @ 18:25  Blood Gas Arterial-Calcium,Ionized--  Blood Urea Nitrogen, Serum 30 mg/dL[H] [10 - 20]  Carbon Dioxide, Serum23 mmol/L [17 - 32]  Chloride, Dvron938 mmol/L [98 - 110]  Creatinie, Serum1.4 mg/dL [0.7 - 1.5]  Glucose, Cyspg391 mg/dL[H] [70 - 99]  Potassium, Serum5.1 mmol/L[H] [3.5 - 5.0]  Sodium, Serum 138 mmol/L [135 - 146]  [facility]  05-09-25 @ 17:58  Blood Gas Arterial-Calcium,Ionized--  Blood Urea Nitrogen, Serum 32 mg/dL[H] [10 - 20]  Carbon Dioxide, Serum22 mmol/L [17 - 32]  Chloride, Ilgrf751 mmol/L [98 - 110]  Creatinie, Serum1.6 mg/dL[H] [0.7 - 1.5]  Glucose, Eqgvp300 mg/dL[H] [70 - 99]  Potassium, Serum5.5 mmol/L[H] [3.5 - 5.0]  Sodium, Serum 137 mmol/L [135 - 146]        Microbiology:    Culture - Blood (collected 05-09-25 @ 07:03)  Source: Blood Blood  Preliminary Report (05-10-25 @ 15:01):    No growth at 24 hours    Urinalysis with Rflx Culture (collected 05-07-25 @ 22:35)    Culture - Urine (collected 05-07-25 @ 22:35)  Source: Clean Catch None  Final Report (05-08-25 @ 23:47):    <10,000 CFU/mL Normal Urogenital Willa    Culture - Blood (collected 05-05-25 @ 17:15)  Source: Blood Blood-Peripheral  Final Report (05-11-25 @ 02:00):    No growth at 5 days    Culture - Blood (collected 05-05-25 @ 17:15)  Source: Blood Blood-Peripheral  Final Report (05-11-25 @ 02:00):    No growth at 5 days      Medications:  acetaminophen     Tablet .. 650 milliGRAM(s) Oral every 6 hours PRN  amLODIPine   Tablet 5 milliGRAM(s) Oral daily  bisacodyl Suppository 10 milliGRAM(s) Rectal daily PRN  lactated ringers. 1000 milliLiter(s) IV Continuous <Continuous>  oxyCODONE    IR 5 milliGRAM(s) Oral every 6 hours PRN  polyethylene glycol 3350 17 Gram(s) Oral two times a day  rosuvastatin 5 milliGRAM(s) Oral at bedtime  senna 2 Tablet(s) Oral at bedtime  sodium bicarbonate 325 milliGRAM(s) Oral every 8 hours        Assessment and Plan:  Patient is a 75 year old woman recently discharged a few days ago for metastatic cancer of unknown primary origin presented with her  as he is having trouble at the moment caring for her. Patient's PCP referred her to the ED because she complained of back pain but did not complain of pain to her     # New onset fever 5/8/25 - blood cxs- neg, RVP- neg   - reviewed U/A- wnr    # Urinary retention- started with bedside bladder scans, straight cath prn. tx.    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  #Left adnexal mass / heterogeneous uterus / splenic and hepatic masses / focal colon thickening   # New Right sided Pleural effusion- most likely due to malignancy   #Immunodeficiency in the setting of advanced age, CKD,  metastatic malignancy among other co-morbidities.  -  CT A/p w/out contrast in April: lobulated liver contour is consistent with cirrhotic changes. Perihepatic 6.2 x 5.0 cm irregular hypodensity/collection. Moderate left hydronephrosis extending into pelvis, likely obstructed by pelvic mass. Anterior 3.6 cm omental mass, possibly connected to adjacent lobulated pelvic mass.  - MR spine in April: Multiple varying sized lesions demonstrated throughout the osseous structures consistent with metastatic disease. The lesions range from punctiform, iliac and sacral bones and larger lesions which is 2.5 and 3   cm (left iliac bone, T12).None of the lesions are associated with soft tissue masses or spinal stenosis. There are multilevel degenerative changes. Mild bulging L5-S1 with foraminal encroachment. Loss of disc space height at L4-L5 mild destructive changes may be previous infection but there is no evidence for edema or enhancement suggest any active infection. No spinal stenosis. Conus is unremarkable. The osseous lesions enhance. Enhancement pattern is otherwise   unremarkable. Adnexal mass partially included on the study.  - < from: CT Chest No Cont (05.07.25 @ 13:31) >  IMPRESSION: Right-sided pleural effusion with underlying atelectasis. No suspicious pulmonary nodules < end of copied text >    - hem/onc on board - post patient's reevaluation rec. to continue with supportive tx.   Code status:  DNR/DNI.  -Palliative care team on board   - Hospice team on board , patient's  voiced that he wants patient to be d/c either to Hospice facility or [facility] , hospice unit.     #CKD stage 3 - stable renal function  # Left hydronephrosis due to pelvic mass  # Metabolic acidosis due to CKD- continue oral bicarb supplement  - outpatient  f/up     # Hyperkalemia- corrected post lokelma tx,     #Anemia of chronic disease , no gross bleeding events, maintain hg 7 or above      #HTN / HLD  -on amlodipine and rosuvastatin  -c/w home meds  -monitor BP    DNR/DNI, overall patient's prognosis is very poor   DVT prophyalxis: Heparin on hold   DIET: Regular.     #Progress Note Handoff: Post d/w patient's , he voiced for patient to be d/c to either Hospice facility or NH with Palliative care.   Family discussion: current medical plan of tx. d/w patient's  by bedside  Disposition: f/up with case management to assist with d/c planning     Total time spent to complete patient's bedside assessment, review medical chart, discuss medical plan of care with covering medical team was more than 35 minutes with >50% of time spent face to face with patient, discussion with patient/family and/or coordination of care .

## 2025-05-11 NOTE — PROGRESS NOTE ADULT - ASSESSMENT
CKD stage 4  hyperkalemia   left hydronephrosis due to pelvic mass  left adnexal mass / heterogeneous uterus / splenic and hepatic masses / focal colon thickening / bone mets  metastatic malignancy of Mullerian origin (ovarian?)  dementia   wt loss / poor PO intake  anemia    HTN    plan:    [facility]IC with PRN bladder scan   encourage oral intake - gentle hydration if creatinine worsens  consider  follow up for ureteral stent vs PCN if  agreeable to full medical measures   lokelma PRN  cont oral sodium bicarbonate   hospice planning  cont amlodipine   f/u  re decision on chemotherapy   DNR / DNI  f/u hospice

## 2025-05-12 ENCOUNTER — TRANSCRIPTION ENCOUNTER (OUTPATIENT)
Age: 75
End: 2025-05-12

## 2025-05-12 LAB — PROCALCITONIN SERPL-MCNC: 0.85 NG/ML — HIGH (ref 0.02–0.1)

## 2025-05-12 PROCEDURE — 99231 SBSQ HOSP IP/OBS SF/LOW 25: CPT

## 2025-05-12 PROCEDURE — 99233 SBSQ HOSP IP/OBS HIGH 50: CPT

## 2025-05-12 RX ORDER — OXYCODONE HYDROCHLORIDE 30 MG/1
1 TABLET ORAL
Qty: 0 | Refills: 0 | DISCHARGE
Start: 2025-05-12

## 2025-05-12 RX ORDER — SENNA 187 MG
2 TABLET ORAL
Qty: 0 | Refills: 0 | DISCHARGE
Start: 2025-05-12

## 2025-05-12 RX ORDER — ACETAMINOPHEN 500 MG/5ML
2 LIQUID (ML) ORAL
Qty: 0 | Refills: 0 | DISCHARGE
Start: 2025-05-12

## 2025-05-12 RX ORDER — POLYETHYLENE GLYCOL 3350 17 G/17G
17 POWDER, FOR SOLUTION ORAL
Qty: 0 | Refills: 0 | DISCHARGE
Start: 2025-05-12

## 2025-05-12 RX ORDER — BISACODYL 5 MG
1 TABLET, DELAYED RELEASE (ENTERIC COATED) ORAL
Qty: 0 | Refills: 0 | DISCHARGE
Start: 2025-05-12

## 2025-05-12 RX ADMIN — AMLODIPINE BESYLATE 5 MILLIGRAM(S): 10 TABLET ORAL at 05:22

## 2025-05-12 RX ADMIN — OXYCODONE HYDROCHLORIDE 5 MILLIGRAM(S): 30 TABLET ORAL at 09:24

## 2025-05-12 RX ADMIN — OXYCODONE HYDROCHLORIDE 5 MILLIGRAM(S): 30 TABLET ORAL at 10:20

## 2025-05-12 RX ADMIN — OXYCODONE HYDROCHLORIDE 5 MILLIGRAM(S): 30 TABLET ORAL at 19:12

## 2025-05-12 RX ADMIN — POLYETHYLENE GLYCOL 3350 17 GRAM(S): 17 POWDER, FOR SOLUTION ORAL at 17:49

## 2025-05-12 RX ADMIN — Medication 325 MILLIGRAM(S): at 05:22

## 2025-05-12 RX ADMIN — POLYETHYLENE GLYCOL 3350 17 GRAM(S): 17 POWDER, FOR SOLUTION ORAL at 05:23

## 2025-05-12 RX ADMIN — Medication 325 MILLIGRAM(S): at 13:08

## 2025-05-12 NOTE — PROGRESS NOTE ADULT - REASON FOR ADMISSION
fever
Carcinomatosis
progressive physical decline
progressive weakness
fever
progressive physical decline

## 2025-05-12 NOTE — PROGRESS NOTE ADULT - SUBJECTIVE AND OBJECTIVE BOX
NEPHROLOGY FOLLOW UP NOTE    pt seen and examined      PAST MEDICAL & SURGICAL HISTORY:  HTN (hypertension)      HLD (hyperlipidemia)      Cataract      S/P liudmila      H/O lumpectomy  left      History of dilation and curettage      Status post cataract extraction and insertion of intraocular lens, left        Allergies:  latex (Rash)  penicillin (Unknown)    Home Medications Reviewed    SOCIAL HISTORY:  Denies ETOH,Smoking,   FAMILY HISTORY:        REVIEW OF SYSTEMS:  All other review of systems is negative unless indicated above.    PHYSICAL EXAM:  Constitutional: NAD, frail   HEENT: anicteric sclera, oropharynx clear, dry MM  Neck: No JVD  Respiratory: CTAB, no wheezes, rales or rhonchi  Cardiovascular: S1, S2, RRR  Gastrointestinal: BS+, soft, NT/ND  Extremities: No cyanosis or clubbing. trace peripheral edema  Neurological: pleasantly confused   : No CVA tenderness. No jo.   Skin: No rashes     Hospital Medications:   MEDICATIONS  (STANDING):  amLODIPine   Tablet 5 milliGRAM(s) Oral daily  lactated ringers. 1000 milliLiter(s) (70 mL/Hr) IV Continuous <Continuous>  polyethylene glycol 3350 17 Gram(s) Oral two times a day  rosuvastatin 5 milliGRAM(s) Oral at bedtime  senna 2 Tablet(s) Oral at bedtime  sodium bicarbonate 325 milliGRAM(s) Oral every 8 hours        VITALS:  T(F): 98.7 (05-12-25 @ 12:09), Max: 101.7 (05-11-25 @ 20:39)  HR: 82 (05-12-25 @ 12:09)  BP: 124/74 (05-12-25 @ 12:09)  RR: 18 (05-12-25 @ 12:09)  SpO2: 95% (05-12-25 @ 12:09)  Wt(kg): --    05-10 @ 07:01  -  05-11 @ 07:00  --------------------------------------------------------  IN: 0 mL / OUT: 900 mL / NET: -900 mL          LABS:  05-11    140  |  104  |  28[H]  ----------------------------<  115[H]  5.1[H]   |  21  |  1.4    Ca    8.3[L]      11 May 2025 07:47  Phos  3.0     05-10  Mg     2.1     05-11    TPro  5.8[L]  /  Alb  2.9[L]  /  TBili  0.4  /  DBili      /  AST  78[H]  /  ALT  42[H]  /  AlkPhos  140[H]  05-11                          7.2    11.50 )-----------( 412      ( 11 May 2025 07:47 )             22.9       Urine Studies:  Urinalysis Basic - ( 11 May 2025 07:47 )    Color:  / Appearance:  / SG:  / pH:   Gluc: 115 mg/dL / Ketone:   / Bili:  / Urobili:    Blood:  / Protein:  / Nitrite:    Leuk Esterase:  / RBC:  / WBC    Sq Epi:  / Non Sq Epi:  / Bacteria:           RADIOLOGY & ADDITIONAL STUDIES:

## 2025-05-12 NOTE — PROGRESS NOTE ADULT - SUBJECTIVE AND OBJECTIVE BOX
Patient previously seen by our team, has stage IV Mullerian carcinomatosis.    Had recommended chemotherapy, but patient and her family are considering comfort care only.

## 2025-05-12 NOTE — PROGRESS NOTE ADULT - ASSESSMENT
CKD stage 4  hyperkalemia   left hydronephrosis due to pelvic mass  left adnexal mass / heterogeneous uterus / splenic and hepatic masses / focal colon thickening / bone mets  metastatic malignancy of Mullerian origin (ovarian?)  dementia   wt loss / poor PO intake  anemia    HTN    plan:     decided against chemotherapy  now comfort care measures only  DNR / DNI  for hospice  d/w Pt sister-in-law

## 2025-05-12 NOTE — DISCHARGE NOTE PROVIDER - HOSPITAL COURSE
75 year old woman recently discharged a few days ago for metastatic cancer of unknown primary origin presented with her  as he is having trouble at the moment caring for her. Patient's PCP referred her to the ED because she complained of back pain but did not complain of pain to her . she is AAO times 1-2 at the moment. Her ROS as per  is negative. Patient has a biopsy done with IR on 4/29 for RUQ omental mass (CT-guided).  Biopsy pending    In the ED vitals WNL    labs: K :5.3 . Other labs were same as in May 2     Patient received LOKELMA 5 mg in the ED    Hospital course:  # New onset fever 5/8/25 - blood cxs- neg, RVP- neg   - reviewed U/A- wnr    # Urinary retention- started with bedside bladder scans, straight cath prn. tx.    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  #Left adnexal mass / heterogeneous uterus / splenic and hepatic masses / focal colon thickening   # New Right sided Pleural effusion- most likely due to malignancy   #Immunodeficiency in the setting of advanced age, CKD,  metastatic malignancy among other co-morbidities.  -  CT A/p w/out contrast in April: lobulated liver contour is consistent with cirrhotic changes. Perihepatic 6.2 x 5.0 cm irregular hypodensity/collection. Moderate left hydronephrosis extending into pelvis, likely obstructed by pelvic mass. Anterior 3.6 cm omental mass, possibly connected to adjacent lobulated pelvic mass.  - MR spine in April: Multiple varying sized lesions demonstrated throughout the osseous structures consistent with metastatic disease. The lesions range from punctiform, iliac and sacral bones and larger lesions which is 2.5 and 3   cm (left iliac bone, T12).None of the lesions are associated with soft tissue masses or spinal stenosis. There are multilevel degenerative changes. Mild bulging L5-S1 with foraminal encroachment. Loss of disc space height at L4-L5 mild destructive changes may be previous infection but there is no evidence for edema or enhancement suggest any active infection. No spinal stenosis. Conus is unremarkable. The osseous lesions enhance. Enhancement pattern is otherwise   unremarkable. Adnexal mass partially included on the study.  - < from: CT Chest No Cont (05.07.25 @ 13:31) >  IMPRESSION: Right-sided pleural effusion with underlying atelectasis. No suspicious pulmonary nodules < end of copied text >    - hem/onc on board - post patient's reevaluation rec. to continue with supportive tx.   Code status:  DNR/DNI.  -Palliative care team on board   - Hospice team on board , patient's  voiced that he wants patient to be d/c either to Hospice facility or [facility] , hospice unit.     #CKD stage 3 - stable renal function  # Left hydronephrosis due to pelvic mass  # Metabolic acidosis due to CKD- continue oral bicarb supplement  - outpatient  f/up     # Hyperkalemia- corrected post lokelma tx,     #Anemia of chronic disease, no gross bleeding events, maintain hg 7 or above      #HTN / HLD  -on amlodipine and rosuvastatin  -c/w home meds  -monitor BP    Discussion of discharge plan of care, including discharge diagnoses, medication reconciliation, and follow-ups was conducted with Dr. Mercedes on 5/13/25, and discharge was approved. 75 year old woman recently discharged a few days ago for metastatic cancer of unknown primary origin presented with her  as he is having trouble at the moment caring for her. Patient's PCP referred her to the ED because she complained of back pain but did not complain of pain to her . she is AAO times 1-2 at the moment. Her ROS as per  is negative. Patient has a biopsy done with IR on 4/29 for RUQ omental mass (CT-guided).  Biopsy pending    In the ED vitals WNL    labs: K :5.3 . Other labs were same as in May 2     Patient received LOKELMA 5 mg in the ED    Hospital course:  # New onset fever 5/8/25 - blood cxs- neg, RVP- neg   - reviewed U/A- wnr    # Urinary retention- started with bedside bladder scans, straight cath prn. tx.    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  #Left adnexal mass / heterogeneous uterus / splenic and hepatic masses / focal colon thickening   # New Right sided Pleural effusion- most likely due to malignancy   #Immunodeficiency in the setting of advanced age, CKD,  metastatic malignancy among other co-morbidities.  -  CT A/p w/out contrast in April: lobulated liver contour is consistent with cirrhotic changes. Perihepatic 6.2 x 5.0 cm irregular hypodensity/collection. Moderate left hydronephrosis extending into pelvis, likely obstructed by pelvic mass. Anterior 3.6 cm omental mass, possibly connected to adjacent lobulated pelvic mass.  - MR spine in April: Multiple varying sized lesions demonstrated throughout the osseous structures consistent with metastatic disease. The lesions range from punctiform, iliac and sacral bones and larger lesions which is 2.5 and 3   cm (left iliac bone, T12).None of the lesions are associated with soft tissue masses or spinal stenosis. There are multilevel degenerative changes. Mild bulging L5-S1 with foraminal encroachment. Loss of disc space height at L4-L5 mild destructive changes may be previous infection but there is no evidence for edema or enhancement suggest any active infection. No spinal stenosis. Conus is unremarkable. The osseous lesions enhance. Enhancement pattern is otherwise   unremarkable. Adnexal mass partially included on the study.  - < from: CT Chest No Cont (05.07.25 @ 13:31) >  IMPRESSION: Right-sided pleural effusion with underlying atelectasis. No suspicious pulmonary nodules < end of copied text >  - hem/onc on board - post patient's reevaluation rec. to continue with supportive tx.   Code status:  DNR/DNI, then CMO as of 5/12/2025  -Palliative care team on board   - Hospice team on board , will be discharged at [facility] with hospice  - No further labs, vitals or follow up appts.     #CKD stage 3 - stable renal function  # Left hydronephrosis due to pelvic mass  # Metabolic acidosis due to CKD- continue oral bicarb supplement  - No further follow up needed, CMO        #HTN / HLD  -on amlodipine and rosuvastatin, discontinue now that she is CMO, these are non-essential meds      Discussion of discharge plan of care, including discharge diagnoses, medication reconciliation, and follow-ups was conducted with Dr. Mercedes on 5/13/25, and discharge was approved.

## 2025-05-12 NOTE — PROGRESS NOTE ADULT - ASSESSMENT
Had a brief discussion with family about the options of comfort care vs cytotoxic chemotherapy.  In my opinion her QOL and survival will be improved by treatment. The option of comfort care only is of course also a very reasonable option.  Questions answered, remain available should there be more in the future.

## 2025-05-12 NOTE — DISCHARGE NOTE PROVIDER - NSDCMRMEDTOKEN_GEN_ALL_CORE_FT
amLODIPine 5 mg oral tablet: 1 tab(s) orally once a day   acetaminophen 325 mg oral tablet: 2 tab(s) orally every 6 hours As needed Temp greater or equal to 38C (100.4F), Moderate Pain (4 - 6)  bisacodyl 10 mg rectal suppository: 1 suppository(ies) rectal once a day As needed Constipation  oxyCODONE 5 mg oral tablet: 1 tab(s) orally every 6 hours As needed Severe Pain (7 - 10)  polyethylene glycol 3350 oral powder for reconstitution: 17 gram(s) orally 2 times a day  senna leaf extract oral tablet: 2 tab(s) orally once a day (at bedtime)

## 2025-05-12 NOTE — DISCHARGE NOTE PROVIDER - CARE PROVIDERS DIRECT ADDRESSES
[email or web address]

## 2025-05-12 NOTE — GOALS OF CARE CONVERSATION - ADVANCED CARE PLANNING - CONVERSATION DETAILS
Patient currently does not appear to have clear capacity as she is not able to provide her name, place or time. ECOG status of 1. Heme/onc and gyne onc discussed with  regarding chemotherapy as an option given her good performance status. I also discussed with  today. After thinking about all the options offered he prefers to proceed with hospice and comfort measures only, understanding the possibility that chemotherapy can prolong life and improve quality of life, though unlikely to achieve complete cure given the stage IV nature of the disease. Patient CMO as of 5/12/2025. Plan to d/c to hospice tomorrow. Prognosis in the order of months.

## 2025-05-12 NOTE — PROGRESS NOTE ADULT - SUBJECTIVE AND OBJECTIVE BOX
SOCORRO JOHN  [facility] 015 B ([facility])            Patient was evaluated and examined  by bedside,                 REVIEW OF SYSTEMS:  please see pertinent positives mentioned above, all other 12 ROS negative      T(C): , Max: 38.7 (05-11-25 @ 20:39)  HR: 82 (05-12-25 @ 12:09)  BP: 124/74 (05-12-25 @ 12:09)  RR: 18 (05-12-25 @ 12:09)  SpO2: 95% (05-12-25 @ 12:09)  CAPILLARY BLOOD GLUCOSE          PHYSICAL EXAM:  General: AOx0, not able to say her name, where she is or the date  HEENT: NCAT  Lungs: No increased WOB  CVS: RRR  Abdomen: , non-distended  Extremities: no edema        LAB  CBC  Date: 05-11-25 @ 07:47  Mean cell Qxggcrtgkr46.8  Mean cell Hemoglobin Conc31.4  Mean cell Volum 88.4  Platelet count-Automate 412  RBC Count 2.59  Red Cell Distrib Width14.1  WBC Count11.50  % Albumin, Urine--  Hematocrit 22.9  Hemoglobin 7.2  CBC  Date: 05-10-25 @ 18:25  Mean cell Wdzokbczow66.3  Mean cell Hemoglobin Conc31.5  Mean cell Volum 89.6  Platelet count-Automate 416  RBC Count 2.69  Red Cell Distrib Width14.1  WBC Count10.76  % Albumin, Urine--  Hematocrit 24.1  Hemoglobin 7.6  CBC  Date: 05-09-25 @ 17:58  Mean cell Rhaxoshpvs92.1  Mean cell Hemoglobin Conc30.9  Mean cell Volum 91.0  Platelet count-Automate 374  RBC Count 2.56  Red Cell Distrib Width14.3  WBC Count8.81  % Albumin, Urine--  Hematocrit 23.3  Hemoglobin 7.2  CBC  Date: 05-09-25 @ 07:46  Mean cell Oyxkqwlzwr99.5  Mean cell Hemoglobin Conc30.6  Mean cell Volum 92.9  Platelet count-Automate 388  RBC Count 2.53  Red Cell Distrib Width14.2  WBC Count8.94  % Albumin, Urine--  Hematocrit 23.5  Hemoglobin 7.2  CBC  Date: 05-08-25 @ 16:26  Mean cell Emlkyqurex19.6  Mean cell Hemoglobin Conc31.1  Mean cell Volum 91.7  Platelet count-Automate 440  RBC Count 2.66  Red Cell Distrib Width14.2  WBC Count9.02  % Albumin, Urine--  Hematocrit 24.4  Hemoglobin 7.6  CBC  Date: 05-08-25 @ 06:56  Mean cell Umsyddjvpl50.9  Mean cell Hemoglobin Conc31.3  Mean cell Volum 92.1  Platelet count-Automate 381  RBC Count 2.53  Red Cell Distrib Width14.1  WBC Count8.09  % Albumin, Urine--  Hematocrit 23.3  Hemoglobin 7.3  CBC  Date: 05-07-25 @ 18:50  Mean cell Cthbtftxjx16.9  Mean cell Hemoglobin Conc31.0  Mean cell Volum 90.0  Platelet count-Automate 347  RBC Count 2.51  Red Cell Distrib Width14.0  WBC Count8.20  % Albumin, Urine--  Hematocrit 22.6  Hemoglobin 7.0  CBC  Date: 05-07-25 @ 06:57  Mean cell Fhxrhhvqpe79.3  Mean cell Hemoglobin Conc30.8  Mean cell Volum 91.9  Platelet count-Automate 362  RBC Count 2.47  Red Cell Distrib Width14.3  WBC Count8.58  % Albumin, Urine--  Hematocrit 22.7  Hemoglobin 7.0  CBC  Date: 05-05-25 @ 17:15  Mean cell Eqgggacuhx21.4  Mean cell Hemoglobin Conc31.0  Mean cell Volum 91.7  Platelet count-Automate 379  RBC Count 2.78  Red Cell Distrib Width13.6  WBC Count9.88  % Albumin, Urine--  Hematocrit 25.5  Hemoglobin 7.9    BMP  05-11-25 @ 07:47  Blood Gas Arterial-Calcium,Ionized--  Blood Urea Nitrogen, Serum 28 mg/dL[H] [10 - 20]  Carbon Dioxide, Serum21 mmol/L [17 - 32]  Chloride, Ozzpz793 mmol/L [98 - 110]  Creatinie, Serum1.4 mg/dL [0.7 - 1.5]  Glucose, Amohs401 mg/dL[H] [70 - 99]  Potassium, Serum5.1 mmol/L[H] [3.5 - 5.0]  Sodium, Serum 140 mmol/L [135 - 146]  BMP  05-10-25 @ 18:25  Blood Gas Arterial-Calcium,Ionized--  Blood Urea Nitrogen, Serum 30 mg/dL[H] [10 - 20]  Carbon Dioxide, Serum23 mmol/L [17 - 32]  Chloride, Bwtgg277 mmol/L [98 - 110]  Creatinie, Serum1.4 mg/dL [0.7 - 1.5]  Glucose, Zjktu864 mg/dL[H] [70 - 99]  Potassium, Serum5.1 mmol/L[H] [3.5 - 5.0]  Sodium, Serum 138 mmol/L [135 - 146]  [facility]  05-09-25 @ 17:58  Blood Gas Arterial-Calcium,Ionized--  Blood Urea Nitrogen, Serum 32 mg/dL[H] [10 - 20]  Carbon Dioxide, Serum22 mmol/L [17 - 32]  Chloride, Ysxdi334 mmol/L [98 - 110]  Creatinie, Serum1.6 mg/dL[H] [0.7 - 1.5]  Glucose, Dillq535 mg/dL[H] [70 - 99]  Potassium, Serum5.5 mmol/L[H] [3.5 - 5.0]  Sodium, Serum 137 mmol/L [135 - 146]  [facility]  05-09-25 @ 07:46  Blood Gas Arterial-Calcium,Ionized--  Blood Urea Nitrogen, Serum 37 mg/dL[H] [10 - 20]  Carbon Dioxide, Serum22 mmol/L [17 - 32]  Chloride, Tkhxg185 mmol/L [98 - 110]  Creatinie, Serum1.8 mg/dL[H] [0.7 - 1.5]  Glucose, Serum98 mg/dL [70 - 99]  Potassium, Serum5.8 mmol/L[H] [3.5 - 5.0] [Delta failure noted. If the result does not correlate with the clinical  picture, please order new sample.]  Sodium, Serum 143 mmol/L [135 - 146]  [facility]  05-08-25 @ 06:56  Blood Gas Arterial-Calcium,Ionized--  Blood Urea Nitrogen, Serum 37 mg/dL[H] [10 - 20]  Carbon Dioxide, Serum20 mmol/L [17 - 32]  Chloride, Ovbqq715 mmol/L [98 - 110]  Creatinie, Serum1.5 mg/dL [0.7 - 1.5]  Glucose, Pvcqf120 mg/dL[H] [70 - 99]  Potassium, Serum4.7 mmol/L [3.5 - 5.0]  Sodium, Serum 140 mmol/L [135 - 146]              Microbiology:    Culture - Blood (collected 05-10-25 @ 18:25)  Source: Blood Blood  Preliminary Report (05-11-25 @ 23:01):    No growth at 24 hours    Culture - Blood (collected 05-09-25 @ 07:03)  Source: Blood Blood  Preliminary Report (05-11-25 @ 15:01):    No growth at 48 Hours    Urinalysis with Rflx Culture (collected 05-07-25 @ 22:35)    Culture - Urine (collected 05-07-25 @ 22:35)  Source: Clean Catch None  Final Report (05-08-25 @ 23:47):    <10,000 CFU/mL Normal Urogenital Willa    Culture - Blood (collected 05-05-25 @ 17:15)  Source: Blood Blood-Peripheral  Final Report (05-11-25 @ 02:00):    No growth at 5 days    Culture - Blood (collected 05-05-25 @ 17:15)  Source: Blood Blood-Peripheral  Final Report (05-11-25 @ 02:00):    No growth at 5 days    Culture - Urine (collected 04-30-25 @ 13:00)  Source: Clean Catch Clean Catch (Midstream)  Final Report (05-01-25 @ 19:17):    No growth    Culture - Blood (collected 04-30-25 @ 11:13)  Source: Blood None  Final Report (05-05-25 @ 23:00):    No growth at 5 days    Culture - Blood (collected 04-29-25 @ 20:17)  Source: Blood Blood  Final Report (05-05-25 @ 03:01):    No growth at 5 days    Culture - Blood (collected 04-23-25 @ 23:59)  Source: Blood Blood-Peripheral  Final Report (04-29-25 @ 09:01):    No growth at 5 days    Culture - Blood (collected 04-23-25 @ 23:59)  Source: Blood Blood-Peripheral  Final Report (04-29-25 @ 09:01):    No growth at 5 days    Culture - Urine (collected 04-23-25 @ 21:04)  Source: Catheterized Catheterized  Final Report (04-25-25 @ 09:37):    <10,000 CFU/mL Normal Urogenital Willa        RADIOLOGY & ADDITIONAL TESTS:        Medications:  acetaminophen     Tablet .. 650 milliGRAM(s) Oral every 6 hours PRN  amLODIPine   Tablet 5 milliGRAM(s) Oral daily  bisacodyl Suppository 10 milliGRAM(s) Rectal daily PRN  lactated ringers. 1000 milliLiter(s) IV Continuous <Continuous>  oxyCODONE    IR 5 milliGRAM(s) Oral every 6 hours PRN  polyethylene glycol 3350 17 Gram(s) Oral two times a day  rosuvastatin 5 milliGRAM(s) Oral at bedtime  senna 2 Tablet(s) Oral at bedtime  sodium bicarbonate 325 milliGRAM(s) Oral every 8 hours        Assessment and Plan:    Patient is a 75 year old woman recently discharged a few days ago for metastatic cancer of unknown primary origin presented with her  as he is having trouble at the moment caring for her. Patient's PCP referred her to the ED because she complained of back pain but did not complain of pain to her     # New onset fever 5/8/25 - blood cxs- neg, RVP- neg   - reviewed U/A- wnr    # Urinary retention- started with bedside bladder scans, straight cath prn. tx.    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  #Left adnexal mass / heterogeneous uterus / osseous, splenic and hepatic metastases/ focal colon thickening   # New Right sided Pleural effusion- most likely due to malignancy   #Immunodeficiency in the setting of advanced age, CKD,  metastatic malignancy among other co-morbidities.  -  CT A/p w/out contrast in April: lobulated liver contour is consistent with cirrhotic changes. Perihepatic 6.2 x 5.0 cm irregular hypodensity/collection. Moderate left hydronephrosis extending into pelvis, likely obstructed by pelvic mass. Anterior 3.6 cm omental mass, possibly connected to adjacent lobulated pelvic mass.  - MR spine in April: Multiple varying sized lesions demonstrated throughout the osseous structures consistent with metastatic disease. The lesions range from punctiform, iliac and sacral bones and larger lesions which is 2.5 and 3   cm (left iliac bone, T12).None of the lesions are associated with soft tissue masses or spinal stenosis. There are multilevel degenerative changes. Mild bulging L5-S1 with foraminal encroachment. Loss of disc space height at L4-L5 mild destructive changes may be previous infection but there is no evidence for edema or enhancement suggest any active infection. No spinal stenosis. Conus is unremarkable. The osseous lesions enhance. Enhancement pattern is otherwise   unremarkable. Adnexal mass partially included on the study.  - < from: CT Chest No Cont (05.07.25 @ 13:31) >  IMPRESSION: Right-sided pleural effusion with underlying atelectasis. No suspicious pulmonary nodules < end of copied text >  - Patient and  were seen by hematology/oncology and gyne/onc this admission. Patient currently does not appear to have clear capacity as she is not able to provide her name, place or time. ECOG status of 1. Heme/onc and gyne onc discussed with  regarding chemotherapy as an option given her good performance status. I also discussed with  today. After thinking about all the options offered he prefers to proceed with hospice and comfort measures only, understanding the possibility that chemotherapy can prolong life and improve quality of life, though unlikely to achieve complete cure given the stage IV nature of the disease. Patient CMO as of 5/12/2025. Plan to d/c to hospice tomorrow  -Palliative care team on board   - Hospice team on board , plan to d/c to hospice tomorrow    #CKD stage 3 - stable renal function  # Left hydronephrosis due to pelvic mass  # Metabolic acidosis due to CKD- continue oral bicarb supplement  - outpatient  f/up     # Hyperkalemia- corrected post lokelma tx,     #Anemia of chronic disease , no gross bleeding events, maintain hg 7 or above      #HTN / HLD  -on amlodipine and rosuvastatin  -c/w home meds  -monitor BP    DNR/DNI, overall patient's prognosis is very poor   DVT prophyalxis: Heparin on hold   DIET: Regular.     #Progress Note Handoff: Discharge to hospice tomorrow.    Time-based billing (NON-critical care).     55 minutes spent on total encounter. The necessity of the time spent during the encounter on this date of service was due to:     time spent on review of labs, imaging studies, old records, obtaining history, personally examining patient, counselling and communicating with patient/ family, entering orders for medications/tests/etc, discussions with other health care providers, documentation in electronic health records, independent interpretation of labs, imaging/procedure results and care coordination           SOCORRO JOHN  [facility] 015 B ([facility])            Patient was evaluated and examined  by bedside,                 REVIEW OF SYSTEMS:  please see pertinent positives mentioned above, all other 12 ROS negative      T(C): , Max: 38.7 (05-11-25 @ 20:39)  HR: 82 (05-12-25 @ 12:09)  BP: 124/74 (05-12-25 @ 12:09)  RR: 18 (05-12-25 @ 12:09)  SpO2: 95% (05-12-25 @ 12:09)  CAPILLARY BLOOD GLUCOSE          PHYSICAL EXAM:  General: AOx0, not able to say her name, where she is or the date  HEENT: NCAT  Lungs: No increased WOB  CVS: RRR  Abdomen: , non-distended  Extremities: no edema        LAB  CBC  Date: 05-11-25 @ 07:47  Mean cell Jkcodfbfvf07.8  Mean cell Hemoglobin Conc31.4  Mean cell Volum 88.4  Platelet count-Automate 412  RBC Count 2.59  Red Cell Distrib Width14.1  WBC Count11.50  % Albumin, Urine--  Hematocrit 22.9  Hemoglobin 7.2  CBC  Date: 05-10-25 @ 18:25  Mean cell Mjugbysiup75.3  Mean cell Hemoglobin Conc31.5  Mean cell Volum 89.6  Platelet count-Automate 416  RBC Count 2.69  Red Cell Distrib Width14.1  WBC Count10.76  % Albumin, Urine--  Hematocrit 24.1  Hemoglobin 7.6  CBC  Date: 05-09-25 @ 17:58  Mean cell Niidgdvlmo50.1  Mean cell Hemoglobin Conc30.9  Mean cell Volum 91.0  Platelet count-Automate 374  RBC Count 2.56  Red Cell Distrib Width14.3  WBC Count8.81  % Albumin, Urine--  Hematocrit 23.3  Hemoglobin 7.2  CBC  Date: 05-09-25 @ 07:46  Mean cell Favpneyadm56.5  Mean cell Hemoglobin Conc30.6  Mean cell Volum 92.9  Platelet count-Automate 388  RBC Count 2.53  Red Cell Distrib Width14.2  WBC Count8.94  % Albumin, Urine--  Hematocrit 23.5  Hemoglobin 7.2  CBC  Date: 05-08-25 @ 16:26  Mean cell Bztkjlgpez44.6  Mean cell Hemoglobin Conc31.1  Mean cell Volum 91.7  Platelet count-Automate 440  RBC Count 2.66  Red Cell Distrib Width14.2  WBC Count9.02  % Albumin, Urine--  Hematocrit 24.4  Hemoglobin 7.6  CBC  Date: 05-08-25 @ 06:56  Mean cell Hcbrrxilau40.9  Mean cell Hemoglobin Conc31.3  Mean cell Volum 92.1  Platelet count-Automate 381  RBC Count 2.53  Red Cell Distrib Width14.1  WBC Count8.09  % Albumin, Urine--  Hematocrit 23.3  Hemoglobin 7.3  CBC  Date: 05-07-25 @ 18:50  Mean cell Mffoeheqxx07.9  Mean cell Hemoglobin Conc31.0  Mean cell Volum 90.0  Platelet count-Automate 347  RBC Count 2.51  Red Cell Distrib Width14.0  WBC Count8.20  % Albumin, Urine--  Hematocrit 22.6  Hemoglobin 7.0  CBC  Date: 05-07-25 @ 06:57  Mean cell Ckfzjpvlqa98.3  Mean cell Hemoglobin Conc30.8  Mean cell Volum 91.9  Platelet count-Automate 362  RBC Count 2.47  Red Cell Distrib Width14.3  WBC Count8.58  % Albumin, Urine--  Hematocrit 22.7  Hemoglobin 7.0  CBC  Date: 05-05-25 @ 17:15  Mean cell Ziijemapgm95.4  Mean cell Hemoglobin Conc31.0  Mean cell Volum 91.7  Platelet count-Automate 379  RBC Count 2.78  Red Cell Distrib Width13.6  WBC Count9.88  % Albumin, Urine--  Hematocrit 25.5  Hemoglobin 7.9    BMP  05-11-25 @ 07:47  Blood Gas Arterial-Calcium,Ionized--  Blood Urea Nitrogen, Serum 28 mg/dL[H] [10 - 20]  Carbon Dioxide, Serum21 mmol/L [17 - 32]  Chloride, Raaob991 mmol/L [98 - 110]  Creatinie, Serum1.4 mg/dL [0.7 - 1.5]  Glucose, Kyvvi026 mg/dL[H] [70 - 99]  Potassium, Serum5.1 mmol/L[H] [3.5 - 5.0]  Sodium, Serum 140 mmol/L [135 - 146]  BMP  05-10-25 @ 18:25  Blood Gas Arterial-Calcium,Ionized--  Blood Urea Nitrogen, Serum 30 mg/dL[H] [10 - 20]  Carbon Dioxide, Serum23 mmol/L [17 - 32]  Chloride, Abzqm803 mmol/L [98 - 110]  Creatinie, Serum1.4 mg/dL [0.7 - 1.5]  Glucose, Bzjdb064 mg/dL[H] [70 - 99]  Potassium, Serum5.1 mmol/L[H] [3.5 - 5.0]  Sodium, Serum 138 mmol/L [135 - 146]  [facility]  05-09-25 @ 17:58  Blood Gas Arterial-Calcium,Ionized--  Blood Urea Nitrogen, Serum 32 mg/dL[H] [10 - 20]  Carbon Dioxide, Serum22 mmol/L [17 - 32]  Chloride, Ftfpo276 mmol/L [98 - 110]  Creatinie, Serum1.6 mg/dL[H] [0.7 - 1.5]  Glucose, Jyjqr965 mg/dL[H] [70 - 99]  Potassium, Serum5.5 mmol/L[H] [3.5 - 5.0]  Sodium, Serum 137 mmol/L [135 - 146]  [facility]  05-09-25 @ 07:46  Blood Gas Arterial-Calcium,Ionized--  Blood Urea Nitrogen, Serum 37 mg/dL[H] [10 - 20]  Carbon Dioxide, Serum22 mmol/L [17 - 32]  Chloride, Exisv694 mmol/L [98 - 110]  Creatinie, Serum1.8 mg/dL[H] [0.7 - 1.5]  Glucose, Serum98 mg/dL [70 - 99]  Potassium, Serum5.8 mmol/L[H] [3.5 - 5.0] [Delta failure noted. If the result does not correlate with the clinical  picture, please order new sample.]  Sodium, Serum 143 mmol/L [135 - 146]  [facility]  05-08-25 @ 06:56  Blood Gas Arterial-Calcium,Ionized--  Blood Urea Nitrogen, Serum 37 mg/dL[H] [10 - 20]  Carbon Dioxide, Serum20 mmol/L [17 - 32]  Chloride, Iwlfx263 mmol/L [98 - 110]  Creatinie, Serum1.5 mg/dL [0.7 - 1.5]  Glucose, Nqzxc525 mg/dL[H] [70 - 99]  Potassium, Serum4.7 mmol/L [3.5 - 5.0]  Sodium, Serum 140 mmol/L [135 - 146]              Microbiology:    Culture - Blood (collected 05-10-25 @ 18:25)  Source: Blood Blood  Preliminary Report (05-11-25 @ 23:01):    No growth at 24 hours    Culture - Blood (collected 05-09-25 @ 07:03)  Source: Blood Blood  Preliminary Report (05-11-25 @ 15:01):    No growth at 48 Hours    Urinalysis with Rflx Culture (collected 05-07-25 @ 22:35)    Culture - Urine (collected 05-07-25 @ 22:35)  Source: Clean Catch None  Final Report (05-08-25 @ 23:47):    <10,000 CFU/mL Normal Urogenital Willa    Culture - Blood (collected 05-05-25 @ 17:15)  Source: Blood Blood-Peripheral  Final Report (05-11-25 @ 02:00):    No growth at 5 days    Culture - Blood (collected 05-05-25 @ 17:15)  Source: Blood Blood-Peripheral  Final Report (05-11-25 @ 02:00):    No growth at 5 days    Culture - Urine (collected 04-30-25 @ 13:00)  Source: Clean Catch Clean Catch (Midstream)  Final Report (05-01-25 @ 19:17):    No growth    Culture - Blood (collected 04-30-25 @ 11:13)  Source: Blood None  Final Report (05-05-25 @ 23:00):    No growth at 5 days    Culture - Blood (collected 04-29-25 @ 20:17)  Source: Blood Blood  Final Report (05-05-25 @ 03:01):    No growth at 5 days    Culture - Blood (collected 04-23-25 @ 23:59)  Source: Blood Blood-Peripheral  Final Report (04-29-25 @ 09:01):    No growth at 5 days    Culture - Blood (collected 04-23-25 @ 23:59)  Source: Blood Blood-Peripheral  Final Report (04-29-25 @ 09:01):    No growth at 5 days    Culture - Urine (collected 04-23-25 @ 21:04)  Source: Catheterized Catheterized  Final Report (04-25-25 @ 09:37):    <10,000 CFU/mL Normal Urogenital Willa        RADIOLOGY & ADDITIONAL TESTS:        Medications:  acetaminophen     Tablet .. 650 milliGRAM(s) Oral every 6 hours PRN  amLODIPine   Tablet 5 milliGRAM(s) Oral daily  bisacodyl Suppository 10 milliGRAM(s) Rectal daily PRN  lactated ringers. 1000 milliLiter(s) IV Continuous <Continuous>  oxyCODONE    IR 5 milliGRAM(s) Oral every 6 hours PRN  polyethylene glycol 3350 17 Gram(s) Oral two times a day  rosuvastatin 5 milliGRAM(s) Oral at bedtime  senna 2 Tablet(s) Oral at bedtime  sodium bicarbonate 325 milliGRAM(s) Oral every 8 hours        Assessment and Plan:    Patient is a 75 year old woman recently discharged a few days ago for metastatic cancer of unknown primary origin presented with her  as he is having trouble at the moment caring for her. Patient's PCP referred her to the ED because she complained of back pain but did not complain of pain to her     # New onset fever 5/8/25 - blood cxs- neg, RVP- neg   - reviewed U/A- wnr    # Urinary retention- started with bedside bladder scans, straight cath prn. tx.    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  #Left adnexal mass / heterogeneous uterus / osseous, splenic and hepatic metastases/ focal colon thickening   # New Right sided Pleural effusion- most likely due to malignancy   #Immunodeficiency in the setting of advanced age, CKD,  metastatic malignancy among other co-morbidities.  -  CT A/p w/out contrast in April: lobulated liver contour is consistent with cirrhotic changes. Perihepatic 6.2 x 5.0 cm irregular hypodensity/collection. Moderate left hydronephrosis extending into pelvis, likely obstructed by pelvic mass. Anterior 3.6 cm omental mass, possibly connected to adjacent lobulated pelvic mass.  - MR spine in April: Multiple varying sized lesions demonstrated throughout the osseous structures consistent with metastatic disease. The lesions range from punctiform, iliac and sacral bones and larger lesions which is 2.5 and 3   cm (left iliac bone, T12).None of the lesions are associated with soft tissue masses or spinal stenosis. There are multilevel degenerative changes. Mild bulging L5-S1 with foraminal encroachment. Loss of disc space height at L4-L5 mild destructive changes may be previous infection but there is no evidence for edema or enhancement suggest any active infection. No spinal stenosis. Conus is unremarkable. The osseous lesions enhance. Enhancement pattern is otherwise   unremarkable. Adnexal mass partially included on the study.  - < from: CT Chest No Cont (05.07.25 @ 13:31) >  IMPRESSION: Right-sided pleural effusion with underlying atelectasis. No suspicious pulmonary nodules < end of copied text >  - Patient and  were seen by hematology/oncology and gyne/onc this admission. Patient currently does not appear to have clear capacity as she is not able to provide her name, place or time. ECOG status of 1. Heme/onc and gyne onc discussed with  regarding chemotherapy as an option given her good performance status. I also discussed with  today. After thinking about all the options offered he prefers to proceed with hospice and comfort measures only, understanding the possibility that chemotherapy can prolong life and improve quality of life, though unlikely to achieve complete cure given the stage IV nature of the disease. Patient CMO as of 5/12/2025. Plan to d/c to hospice tomorrow  -Palliative care team on board   - Hospice team on board , plan to d/c to hospice tomorrow    #CKD stage 3 - stable renal function  # Left hydronephrosis due to pelvic mass  # Metabolic acidosis due to CKD- continue oral bicarb supplement  - outpatient  f/up     # Hyperkalemia- corrected post lokelma tx,     #Anemia of chronic disease , no gross bleeding events, maintain hg 7 or above      #HTN / HLD  -on amlodipine and rosuvastatin  -c/w home meds  -monitor BP    DNR/DNI, overall patient's prognosis is very poor   DVT prophyalxis: Heparin on hold   DIET: Regular.     #Progress Note Handoff: Discharge to hospice tomorrow.    Time-based billing (NON-critical care).     55 minutes spent on total encounter. The necessity of the time spent during the encounter on this date of service was due to:     time spent on review of labs, imaging studies, old records, obtaining history, personally examining patient, counselling and communicating with patient/ family, entering orders for medications/tests/etc, discussions with other health care providers, documentation in electronic health records, independent interpretation of labs, imaging/procedure results and care coordination

## 2025-05-12 NOTE — GOALS OF CARE CONVERSATION - ADVANCED CARE PLANNING - CONVERSATION/DISCUSSION
Diagnosis/Prognosis/MOLST Discussed/Treatment Options
Diagnosis/Prognosis
Diagnosis/Prognosis/Treatment Options/Hospice Referral

## 2025-05-12 NOTE — DISCHARGE NOTE PROVIDER - CARE PROVIDER_API CALL
[place]  Internal Medicine  [address] [place] [address]  Phone: [phone]  Fax: [phone]  Follow Up Time: 2 weeks

## 2025-05-12 NOTE — DISCHARGE NOTE PROVIDER - NSDCCPCAREPLAN_GEN_ALL_CORE_FT
PRINCIPAL DISCHARGE DIAGNOSIS  Diagnosis: Confusion  Assessment and Plan of Treatment: You were admitted to the hospital for altered mental status and back pain. We arewaiting for pathology of the biopsy done last week. You will be dicharged to hopsice. Please take all medications as prescribed.      SECONDARY DISCHARGE DIAGNOSES  Diagnosis: Fever  Assessment and Plan of Treatment:

## 2025-05-13 ENCOUNTER — TRANSCRIPTION ENCOUNTER (OUTPATIENT)
Age: 75
End: 2025-05-13

## 2025-05-13 VITALS
TEMPERATURE: 98 F | RESPIRATION RATE: 18 BRPM | OXYGEN SATURATION: 94 % | SYSTOLIC BLOOD PRESSURE: 123 MMHG | HEART RATE: 85 BPM | DIASTOLIC BLOOD PRESSURE: 59 MMHG

## 2025-05-13 PROCEDURE — 99239 HOSP IP/OBS DSCHRG MGMT >30: CPT

## 2025-05-13 RX ADMIN — OXYCODONE HYDROCHLORIDE 5 MILLIGRAM(S): 30 TABLET ORAL at 07:10

## 2025-05-13 RX ADMIN — OXYCODONE HYDROCHLORIDE 5 MILLIGRAM(S): 30 TABLET ORAL at 06:52

## 2025-05-13 NOTE — PROGRESS NOTE ADULT - ASSESSMENT
CKD stage 4  hyperkalemia   left hydronephrosis due to pelvic mass  left adnexal mass / heterogeneous uterus / splenic and hepatic masses / focal colon thickening / bone mets  metastatic malignancy of Mullerian origin (ovarian?)  dementia   wt loss / poor PO intake  anemia    HTN    plan:    CMO  DNR / DNI  for hospice

## 2025-05-13 NOTE — PROGRESS NOTE ADULT - SUBJECTIVE AND OBJECTIVE BOX
SOCORRO JOHN  [facility] 015 B ([facility])            Patient was evaluated and examined  by bedside,                 REVIEW OF SYSTEMS:  please see pertinent positives mentioned above, all other 12 ROS negative      T(C): , Max: 37.7 (05-12-25 @ 19:25)  HR: 85 (05-13-25 @ 04:40)  BP: 123/59 (05-13-25 @ 04:40)  RR: 18 (05-13-25 @ 04:40)  SpO2: 94% (05-13-25 @ 04:40)  CAPILLARY BLOOD GLUCOSE          PHYSICAL EXAM:  Deferred due to CMO      LAB  CBC  Date: 05-11-25 @ 07:47  Mean cell Vifttejbkk92.8  Mean cell Hemoglobin Conc31.4  Mean cell Volum 88.4  Platelet count-Automate 412  RBC Count 2.59  Red Cell Distrib Width14.1  WBC Count11.50  % Albumin, Urine--  Hematocrit 22.9  Hemoglobin 7.2  CBC  Date: 05-10-25 @ 18:25  Mean cell Rmdxhgqarl06.3  Mean cell Hemoglobin Conc31.5  Mean cell Volum 89.6  Platelet count-Automate 416  RBC Count 2.69  Red Cell Distrib Width14.1  WBC Count10.76  % Albumin, Urine--  Hematocrit 24.1  Hemoglobin 7.6  CBC  Date: 05-09-25 @ 17:58  Mean cell Ipzifmsevf18.1  Mean cell Hemoglobin Conc30.9  Mean cell Volum 91.0  Platelet count-Automate 374  RBC Count 2.56  Red Cell Distrib Width14.3  WBC Count8.81  % Albumin, Urine--  Hematocrit 23.3  Hemoglobin 7.2  CBC  Date: 05-09-25 @ 07:46  Mean cell Nquwpjommu26.5  Mean cell Hemoglobin Conc30.6  Mean cell Volum 92.9  Platelet count-Automate 388  RBC Count 2.53  Red Cell Distrib Width14.2  WBC Count8.94  % Albumin, Urine--  Hematocrit 23.5  Hemoglobin 7.2  CBC  Date: 05-08-25 @ 16:26  Mean cell Ondabzauoj18.6  Mean cell Hemoglobin Conc31.1  Mean cell Volum 91.7  Platelet count-Automate 440  RBC Count 2.66  Red Cell Distrib Width14.2  WBC Count9.02  % Albumin, Urine--  Hematocrit 24.4  Hemoglobin 7.6  CBC  Date: 05-08-25 @ 06:56  Mean cell Hlasausxsh87.9  Mean cell Hemoglobin Conc31.3  Mean cell Volum 92.1  Platelet count-Automate 381  RBC Count 2.53  Red Cell Distrib Width14.1  WBC Count8.09  % Albumin, Urine--  Hematocrit 23.3  Hemoglobin 7.3  CBC  Date: 05-07-25 @ 18:50  Mean cell Canofovqda84.9  Mean cell Hemoglobin Conc31.0  Mean cell Volum 90.0  Platelet count-Automate 347  RBC Count 2.51  Red Cell Distrib Width14.0  WBC Count8.20  % Albumin, Urine--  Hematocrit 22.6  Hemoglobin 7.0  CBC  Date: 05-07-25 @ 06:57  Mean cell Yeukpnfccy89.3  Mean cell Hemoglobin Conc30.8  Mean cell Volum 91.9  Platelet count-Automate 362  RBC Count 2.47  Red Cell Distrib Width14.3  WBC Count8.58  % Albumin, Urine--  Hematocrit 22.7  Hemoglobin 7.0    [facility]  05-11-25 @ 07:47  Blood Gas Arterial-Calcium,Ionized--  Blood Urea Nitrogen, Serum 28 mg/dL[H] [10 - 20]  Carbon Dioxide, Serum21 mmol/L [17 - 32]  Chloride, Lbgzf972 mmol/L [98 - 110]  Creatinie, Serum1.4 mg/dL [0.7 - 1.5]  Glucose, Ifitx899 mg/dL[H] [70 - 99]  Potassium, Serum5.1 mmol/L[H] [3.5 - 5.0]  Sodium, Serum 140 mmol/L [135 - 146]  [facility]  05-10-25 @ 18:25  Blood Gas Arterial-Calcium,Ionized--  Blood Urea Nitrogen, Serum 30 mg/dL[H] [10 - 20]  Carbon Dioxide, Serum23 mmol/L [17 - 32]  Chloride, Tkadz264 mmol/L [98 - 110]  Creatinie, Serum1.4 mg/dL [0.7 - 1.5]  Glucose, Glufu889 mg/dL[H] [70 - 99]  Potassium, Serum5.1 mmol/L[H] [3.5 - 5.0]  Sodium, Serum 138 mmol/L [135 - 146]  [facility]  05-09-25 @ 17:58  Blood Gas Arterial-Calcium,Ionized--  Blood Urea Nitrogen, Serum 32 mg/dL[H] [10 - 20]  Carbon Dioxide, Serum22 mmol/L [17 - 32]  Chloride, Uwpiy115 mmol/L [98 - 110]  Creatinie, Serum1.6 mg/dL[H] [0.7 - 1.5]  Glucose, Kmdpf762 mg/dL[H] [70 - 99]  Potassium, Serum5.5 mmol/L[H] [3.5 - 5.0]  Sodium, Serum 137 mmol/L [135 - 146]  BMP  05-09-25 @ 07:46  Blood Gas Arterial-Calcium,Ionized--  Blood Urea Nitrogen, Serum 37 mg/dL[H] [10 - 20]  Carbon Dioxide, Serum22 mmol/L [17 - 32]  Chloride, Bmxom272 mmol/L [98 - 110]  Creatinie, Serum1.8 mg/dL[H] [0.7 - 1.5]  Glucose, Serum98 mg/dL [70 - 99]  Potassium, Serum5.8 mmol/L[H] [3.5 - 5.0] [Delta failure noted. If the result does not correlate with the clinical  picture, please order new sample.]  Sodium, Serum 143 mmol/L [135 - 146]              Microbiology:    Culture - Blood (collected 05-10-25 @ 18:25)  Source: Blood Blood  Preliminary Report (05-12-25 @ 23:01):    No growth at 48 Hours    Culture - Blood (collected 05-09-25 @ 07:03)  Source: Blood Blood  Preliminary Report (05-12-25 @ 15:01):    No growth at 72 Hours    Urinalysis with Rflx Culture (collected 05-07-25 @ 22:35)    Culture - Urine (collected 05-07-25 @ 22:35)  Source: Clean Catch None  Final Report (05-08-25 @ 23:47):    <10,000 CFU/mL Normal Urogenital Willa    Culture - Blood (collected 05-05-25 @ 17:15)  Source: Blood Blood-Peripheral  Final Report (05-11-25 @ 02:00):    No growth at 5 days    Culture - Blood (collected 05-05-25 @ 17:15)  Source: Blood Blood-Peripheral  Final Report (05-11-25 @ 02:00):    No growth at 5 days    Culture - Urine (collected 04-30-25 @ 13:00)  Source: Clean Catch Clean Catch (Midstream)  Final Report (05-01-25 @ 19:17):    No growth    Culture - Blood (collected 04-30-25 @ 11:13)  Source: Blood None  Final Report (05-05-25 @ 23:00):    No growth at 5 days    Culture - Blood (collected 04-29-25 @ 20:17)  Source: Blood Blood  Final Report (05-05-25 @ 03:01):    No growth at 5 days    Culture - Blood (collected 04-23-25 @ 23:59)  Source: Blood Blood-Peripheral  Final Report (04-29-25 @ 09:01):    No growth at 5 days    Culture - Blood (collected 04-23-25 @ 23:59)  Source: Blood Blood-Peripheral  Final Report (04-29-25 @ 09:01):    No growth at 5 days    Culture - Urine (collected 04-23-25 @ 21:04)  Source: Catheterized Catheterized  Final Report (04-25-25 @ 09:37):    <10,000 CFU/mL Normal Urogenital Willa        RADIOLOGY & ADDITIONAL TESTS:        Medications:  acetaminophen     Tablet .. 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  #Left adnexal mass / heterogeneous uterus / osseous, splenic and hepatic metastases/ focal colon thickening   # New Right sided Pleural effusion- most likely due to malignancy   #Immunodeficiency in the setting of advanced age, CKD,  metastatic malignancy among other co-morbidities.  -  CT A/p w/out contrast in April: lobulated liver contour is consistent with cirrhotic changes. Perihepatic 6.2 x 5.0 cm irregular hypodensity/collection. Moderate left hydronephrosis extending into pelvis, likely obstructed by pelvic mass. Anterior 3.6 cm omental mass, possibly connected to adjacent lobulated pelvic mass.  - MR spine in April: Multiple varying sized lesions demonstrated throughout the osseous structures consistent with metastatic disease. The lesions range from punctiform, iliac and sacral bones and larger lesions which is 2.5 and 3   cm (left iliac bone, T12).None of the lesions are associated with soft tissue masses or spinal stenosis. There are multilevel degenerative changes. Mild bulging L5-S1 with foraminal encroachment. Loss of disc space height at L4-L5 mild destructive changes may be previous infection but there is no evidence for edema or enhancement suggest any active infection. No spinal stenosis. Conus is unremarkable. The osseous lesions enhance. Enhancement pattern is otherwise   unremarkable. Adnexal mass partially included on the study.  - < from: CT Chest No Cont (05.07.25 @ 13:31) >  IMPRESSION: Right-sided pleural effusion with underlying atelectasis. No suspicious pulmonary nodules < end of copied text >  - Patient and  were seen by hematology/oncology and gyne/onc this admission. Patient currently does not appear to have clear capacity as she is not able to provide her name, place or time. ECOG status of 1. Heme/onc and gyne onc discussed with  regarding chemotherapy as an option given her good performance status. I also discussed with  today. After thinking about all the options offered he prefers to proceed with hospice and comfort measures only, understanding the possibility that chemotherapy can prolong life and improve quality of life, though unlikely to achieve complete cure given the stage IV nature of the disease. Patient CMO as of 5/12/2025. Plan to d/c to hospice tomorrow  -Palliative care team on board   - Hospice team on board , plan to d/c to hospice tomorrow    #CKD stage 3 - stable renal function  # Left hydronephrosis due to pelvic mass  # Metabolic acidosis due to CKD- continue oral bicarb supplement  - outpatient  f/up     # Hyperkalemia- corrected post lokelma tx,     #Anemia of chronic disease , no gross bleeding events, maintain hg 7 or above      #HTN / HLD  -on amlodipine and rosuvastatin  -c/w home meds  -monitor BP    DNR/DNI, overall patient's prognosis is very poor   DVT prophyalxis: Heparin on hold   DIET: Regular.   D/c to [facility] with hospice today

## 2025-05-13 NOTE — PROGRESS NOTE ADULT - PROVIDER SPECIALTY LIST ADULT
Gyn Onc
Internal Medicine
Nephrology
Internal Medicine
Nephrology
Heme/Onc
Hospitalist
Internal Medicine
Internal Medicine
Nephrology
4 = No assist / stand by assistance

## 2025-05-13 NOTE — PROGRESS NOTE ADULT - NUTRITIONAL ASSESSMENT
This patient has been assessed with a concern for Malnutrition and has been determined to have a diagnosis/diagnoses of Moderate protein-calorie malnutrition.    This patient is being managed with:   Diet DASH/TLC-  Sodium & Cholesterol Restricted  For patients receiving Renal Replacement - No Protein Restr No Conc K No Conc Phos Low  Sodium (RENAL)  Prosource Gelatein Plus     Qty per Day:  2  Supplement Feeding Modality:  Oral  Nepro Cans or Servings Per Day:  1       Frequency:  Two Times a day  Ensure Pudding Cans or Servings Per Day:  1       Frequency:  Daily  Entered: May  8 2025  9:35PM  

## 2025-05-13 NOTE — PROGRESS NOTE ADULT - SUBJECTIVE AND OBJECTIVE BOX
NEPHROLOGY FOLLOW UP NOTE    for dc today      PAST MEDICAL & SURGICAL HISTORY:  HTN (hypertension)      HLD (hyperlipidemia)      Cataract      S/P liudmila      H/O lumpectomy  left      History of dilation and curettage      Status post cataract extraction and insertion of intraocular lens, left        Allergies:  latex (Rash)  penicillin (Unknown)    Home Medications Reviewed    SOCIAL HISTORY:  Denies ETOH,Smoking,   FAMILY HISTORY:        REVIEW OF SYSTEMS:  All other review of systems is negative unless indicated above.    PHYSICAL EXAM:  Constitutional: NAD, frail   HEENT: anicteric sclera, oropharynx clear, dry MM  Neck: No JVD  Respiratory: CTAB, no wheezes, rales or rhonchi  Cardiovascular: S1, S2, RRR  Gastrointestinal: BS+, soft, NT/ND  Extremities: No cyanosis or clubbing. trace peripheral edema  Neurological: pleasantly confused   : No CVA tenderness. No jo.   Skin: No rashes     Hospital Medications:   MEDICATIONS  (STANDING):  polyethylene glycol 3350 17 Gram(s) Oral two times a day  senna 2 Tablet(s) Oral at bedtime        VITALS:  T(F): 98.3 (05-13-25 @ 04:40), Max: 99.8 (05-12-25 @ 19:25)  HR: 85 (05-13-25 @ 04:40)  BP: 123/59 (05-13-25 @ 04:40)  RR: 18 (05-13-25 @ 04:40)  SpO2: 94% (05-13-25 @ 04:40)  Wt(kg): --    05-12 @ 07:01  -  05-13 @ 07:00  --------------------------------------------------------  IN: 0 mL / OUT: 800 mL / NET: -800 mL          LABS:            Urine Studies:  Urinalysis Basic - ( 11 May 2025 07:47 )    Color:  / Appearance:  / SG:  / pH:   Gluc: 115 mg/dL / Ketone:   / Bili:  / Urobili:    Blood:  / Protein:  / Nitrite:    Leuk Esterase:  / RBC:  / WBC    Sq Epi:  / Non Sq Epi:  / Bacteria:           RADIOLOGY & ADDITIONAL STUDIES:

## 2025-05-13 NOTE — DISCHARGE NOTE NURSING/CASE MANAGEMENT/SOCIAL WORK - FINANCIAL ASSISTANCE
[facility] provides services at a reduced cost to those who are determined to be eligible through [facility]’s financial assistance program. Information regarding [facility]’s financial assistance program can be found by going to [email or web address] or by calling [phone].

## 2025-05-14 LAB
CULTURE RESULTS: SIGNIFICANT CHANGE UP
SPECIMEN SOURCE: SIGNIFICANT CHANGE UP

## 2025-05-15 LAB
CULTURE RESULTS: SIGNIFICANT CHANGE UP
SPECIMEN SOURCE: SIGNIFICANT CHANGE UP

## 2025-05-23 DIAGNOSIS — N17.9 ACUTE KIDNEY FAILURE, UNSPECIFIED: ICD-10-CM

## 2025-05-23 DIAGNOSIS — C79.51 SECONDARY MALIGNANT NEOPLASM OF BONE: ICD-10-CM

## 2025-05-23 DIAGNOSIS — N18.4 CHRONIC KIDNEY DISEASE, STAGE 4 (SEVERE): ICD-10-CM

## 2025-05-23 DIAGNOSIS — R33.9 RETENTION OF URINE, UNSPECIFIED: ICD-10-CM

## 2025-05-23 DIAGNOSIS — Z91.040 LATEX ALLERGY STATUS: ICD-10-CM

## 2025-05-23 DIAGNOSIS — E44.0 MODERATE PROTEIN-CALORIE MALNUTRITION: ICD-10-CM

## 2025-05-23 DIAGNOSIS — Z88.0 ALLERGY STATUS TO PENICILLIN: ICD-10-CM

## 2025-05-23 DIAGNOSIS — E87.20 ACIDOSIS, UNSPECIFIED: ICD-10-CM

## 2025-05-23 DIAGNOSIS — D84.9 IMMUNODEFICIENCY, UNSPECIFIED: ICD-10-CM

## 2025-05-23 DIAGNOSIS — E87.5 HYPERKALEMIA: ICD-10-CM

## 2025-05-23 DIAGNOSIS — E78.5 HYPERLIPIDEMIA, UNSPECIFIED: ICD-10-CM

## 2025-05-23 DIAGNOSIS — N13.39 OTHER HYDRONEPHROSIS: ICD-10-CM

## 2025-05-23 DIAGNOSIS — I12.9 HYPERTENSIVE CHRONIC KIDNEY DISEASE WITH STAGE 1 THROUGH STAGE 4 CHRONIC KIDNEY DISEASE, OR UNSPECIFIED CHRONIC KIDNEY DISEASE: ICD-10-CM

## 2025-05-23 DIAGNOSIS — D63.8 ANEMIA IN OTHER CHRONIC DISEASES CLASSIFIED ELSEWHERE: ICD-10-CM

## 2025-05-23 DIAGNOSIS — C78.6 SECONDARY MALIGNANT NEOPLASM OF RETROPERITONEUM AND PERITONEUM: ICD-10-CM

## 2025-05-23 DIAGNOSIS — Z66 DO NOT RESUSCITATE: ICD-10-CM
